# Patient Record
Sex: MALE | Race: BLACK OR AFRICAN AMERICAN | NOT HISPANIC OR LATINO | URBAN - METROPOLITAN AREA
[De-identification: names, ages, dates, MRNs, and addresses within clinical notes are randomized per-mention and may not be internally consistent; named-entity substitution may affect disease eponyms.]

---

## 2017-08-29 ENCOUNTER — EMERGENCY (EMERGENCY)
Facility: HOSPITAL | Age: 62
LOS: 0 days | Discharge: HOME | End: 2017-08-29
Attending: EMERGENCY MEDICINE
Payer: COMMERCIAL

## 2017-08-29 VITALS
WEIGHT: 244.93 LBS | RESPIRATION RATE: 18 BRPM | OXYGEN SATURATION: 100 % | TEMPERATURE: 98 F | HEART RATE: 95 BPM | SYSTOLIC BLOOD PRESSURE: 210 MMHG | DIASTOLIC BLOOD PRESSURE: 121 MMHG

## 2017-08-29 VITALS
DIASTOLIC BLOOD PRESSURE: 98 MMHG | SYSTOLIC BLOOD PRESSURE: 142 MMHG | TEMPERATURE: 98 F | RESPIRATION RATE: 18 BRPM | OXYGEN SATURATION: 100 % | HEART RATE: 76 BPM

## 2017-08-29 LAB
ALBUMIN SERPL ELPH-MCNC: 3.8 G/DL — SIGNIFICANT CHANGE UP (ref 3.3–5)
ALP SERPL-CCNC: 89 U/L — SIGNIFICANT CHANGE UP (ref 40–120)
ALT FLD-CCNC: 33 U/L — SIGNIFICANT CHANGE UP (ref 12–78)
ANION GAP SERPL CALC-SCNC: 7 MMOL/L — SIGNIFICANT CHANGE UP (ref 5–17)
APPEARANCE UR: ABNORMAL
APTT BLD: 31.2 SEC — SIGNIFICANT CHANGE UP (ref 27.5–37.4)
AST SERPL-CCNC: 17 U/L — SIGNIFICANT CHANGE UP (ref 15–37)
BACTERIA # UR AUTO: ABNORMAL
BASOPHILS # BLD AUTO: 0.1 K/UL — SIGNIFICANT CHANGE UP (ref 0–0.2)
BASOPHILS NFR BLD AUTO: 1.8 % — SIGNIFICANT CHANGE UP (ref 0–2)
BILIRUB SERPL-MCNC: 0.3 MG/DL — SIGNIFICANT CHANGE UP (ref 0.2–1.2)
BILIRUB UR-MCNC: NEGATIVE — SIGNIFICANT CHANGE UP
BUN SERPL-MCNC: 17 MG/DL — SIGNIFICANT CHANGE UP (ref 7–23)
CALCIUM SERPL-MCNC: 9 MG/DL — SIGNIFICANT CHANGE UP (ref 8.5–10.1)
CHLORIDE SERPL-SCNC: 110 MMOL/L — HIGH (ref 96–108)
CK SERPL-CCNC: 242 U/L — SIGNIFICANT CHANGE UP (ref 26–308)
CO2 SERPL-SCNC: 25 MMOL/L — SIGNIFICANT CHANGE UP (ref 22–31)
COLOR SPEC: ABNORMAL
CREAT SERPL-MCNC: 1.46 MG/DL — HIGH (ref 0.5–1.3)
DIFF PNL FLD: ABNORMAL
EOSINOPHIL # BLD AUTO: 0.1 K/UL — SIGNIFICANT CHANGE UP (ref 0–0.5)
EOSINOPHIL NFR BLD AUTO: 2.4 % — SIGNIFICANT CHANGE UP (ref 0–6)
GLUCOSE SERPL-MCNC: 83 MG/DL — SIGNIFICANT CHANGE UP (ref 70–99)
GLUCOSE UR QL: NEGATIVE MG/DL — SIGNIFICANT CHANGE UP
HCT VFR BLD CALC: 45.3 % — SIGNIFICANT CHANGE UP (ref 39–50)
HGB BLD-MCNC: 14.8 G/DL — SIGNIFICANT CHANGE UP (ref 13–17)
INR BLD: 1.15 RATIO — SIGNIFICANT CHANGE UP (ref 0.88–1.16)
KETONES UR-MCNC: NEGATIVE — SIGNIFICANT CHANGE UP
LEUKOCYTE ESTERASE UR-ACNC: ABNORMAL
LYMPHOCYTES # BLD AUTO: 2.5 K/UL — SIGNIFICANT CHANGE UP (ref 1–3.3)
LYMPHOCYTES # BLD AUTO: 40 % — SIGNIFICANT CHANGE UP (ref 13–44)
MCHC RBC-ENTMCNC: 28.7 PG — SIGNIFICANT CHANGE UP (ref 27–34)
MCHC RBC-ENTMCNC: 32.6 GM/DL — SIGNIFICANT CHANGE UP (ref 32–36)
MCV RBC AUTO: 88.1 FL — SIGNIFICANT CHANGE UP (ref 80–100)
MONOCYTES # BLD AUTO: 0.6 K/UL — SIGNIFICANT CHANGE UP (ref 0–0.9)
MONOCYTES NFR BLD AUTO: 10 % — SIGNIFICANT CHANGE UP (ref 2–14)
NEUTROPHILS # BLD AUTO: 2.9 K/UL — SIGNIFICANT CHANGE UP (ref 1.8–7.4)
NEUTROPHILS NFR BLD AUTO: 45.8 % — SIGNIFICANT CHANGE UP (ref 43–77)
NITRITE UR-MCNC: POSITIVE
PH UR: 5 — SIGNIFICANT CHANGE UP (ref 5–8)
PLATELET # BLD AUTO: 284 K/UL — SIGNIFICANT CHANGE UP (ref 150–400)
POTASSIUM SERPL-MCNC: 3.8 MMOL/L — SIGNIFICANT CHANGE UP (ref 3.5–5.3)
POTASSIUM SERPL-SCNC: 3.8 MMOL/L — SIGNIFICANT CHANGE UP (ref 3.5–5.3)
PROT SERPL-MCNC: 8.1 GM/DL — SIGNIFICANT CHANGE UP (ref 6–8.3)
PROT UR-MCNC: 100 MG/DL
PROTHROM AB SERPL-ACNC: 12.6 SEC — SIGNIFICANT CHANGE UP (ref 9.8–12.7)
RBC # BLD: 5.14 M/UL — SIGNIFICANT CHANGE UP (ref 4.2–5.8)
RBC # FLD: 12.9 % — SIGNIFICANT CHANGE UP (ref 11–15)
RBC CASTS # UR COMP ASSIST: SIGNIFICANT CHANGE UP /HPF (ref 0–4)
SODIUM SERPL-SCNC: 142 MMOL/L — SIGNIFICANT CHANGE UP (ref 135–145)
SP GR SPEC: 1.01 — SIGNIFICANT CHANGE UP (ref 1.01–1.02)
UROBILINOGEN FLD QL: NEGATIVE MG/DL — SIGNIFICANT CHANGE UP
WBC # BLD: 6.2 K/UL — SIGNIFICANT CHANGE UP (ref 3.8–10.5)
WBC # FLD AUTO: 6.2 K/UL — SIGNIFICANT CHANGE UP (ref 3.8–10.5)
WBC UR QL: ABNORMAL

## 2017-08-29 PROCEDURE — 70450 CT HEAD/BRAIN W/O DYE: CPT | Mod: 26

## 2017-08-29 PROCEDURE — 74176 CT ABD & PELVIS W/O CONTRAST: CPT | Mod: 26

## 2017-08-29 PROCEDURE — 99285 EMERGENCY DEPT VISIT HI MDM: CPT

## 2017-08-29 RX ORDER — AMLODIPINE BESYLATE 2.5 MG/1
1 TABLET ORAL
Qty: 30 | Refills: 0
Start: 2017-08-29 | End: 2017-09-28

## 2017-08-29 RX ORDER — AMLODIPINE BESYLATE 2.5 MG/1
5 TABLET ORAL ONCE
Qty: 0 | Refills: 0 | Status: COMPLETED | OUTPATIENT
Start: 2017-08-29 | End: 2017-08-29

## 2017-08-29 RX ORDER — NITROFURANTOIN MACROCRYSTAL 50 MG
1 CAPSULE ORAL
Qty: 20 | Refills: 0
Start: 2017-08-29 | End: 2017-09-08

## 2017-08-29 RX ORDER — NITROFURANTOIN MACROCRYSTAL 50 MG
100 CAPSULE ORAL ONCE
Qty: 0 | Refills: 0 | Status: COMPLETED | OUTPATIENT
Start: 2017-08-29 | End: 2017-08-29

## 2017-08-29 RX ADMIN — Medication 100 MILLIGRAM(S): at 16:09

## 2017-08-29 RX ADMIN — AMLODIPINE BESYLATE 5 MILLIGRAM(S): 2.5 TABLET ORAL at 16:09

## 2017-08-29 RX ADMIN — Medication 0.2 MILLIGRAM(S): at 13:43

## 2017-08-29 NOTE — ED PROVIDER NOTE - OBJECTIVE STATEMENT
62 years old male walked in with his daughter c/o bloody urine for couple days. Pt denies recent trauma, headache, dizziness, blurred visions, light sensitivities, focal/distal weakness or numbness, cough, sob, chest pain, nausea, vomiting, fever, chills, abd pain, dysuria or irregular bowel movements. Pt sts he has a hx of hypertension but not taking his medication for one year and does not remember the name of medication.

## 2017-08-29 NOTE — ED ADULT NURSE NOTE - OBJECTIVE STATEMENT
received pt sitting on stretcher c/o  urinary symptom since sunday denies any pain on urination , dark color urine denies any urinary frequnecy

## 2017-08-29 NOTE — ED PROVIDER NOTE - CONSTITUTIONAL, MLM
normal... Well appearing, well nourished, awake, alert, oriented to person, place, time/situation and in no apparent distress. Speaking in clear full sentences no nasal flaring no shoulders retractions smiling appears very comfortable siting up in the stretcher in a bright light room

## 2017-08-29 NOTE — ED ADULT NURSE NOTE - CHIEF COMPLAINT QUOTE
pt presents to the Ed with c/o HTN and hematuria send by [facility], denies HA

## 2017-08-29 NOTE — ED PROVIDER NOTE - PROGRESS NOTE DETAILS
Pt is alert and oriented x 3 smiling denies headache, dizziness, blurred visions, light sensitivities, focal/distal weakness or numbness, cough, sob, chest pain, nausea, vomiting, fever, chills, abd pain. Pt and his son are given and explained all test reports and advised to follow up with her doctor as soon as possible for further blood pressure management.

## 2017-08-30 DIAGNOSIS — I10 ESSENTIAL (PRIMARY) HYPERTENSION: ICD-10-CM

## 2017-08-30 DIAGNOSIS — N39.0 URINARY TRACT INFECTION, SITE NOT SPECIFIED: ICD-10-CM

## 2017-08-30 DIAGNOSIS — R51 HEADACHE: ICD-10-CM

## 2017-08-30 DIAGNOSIS — R42 DIZZINESS AND GIDDINESS: ICD-10-CM

## 2017-08-30 DIAGNOSIS — R31.9 HEMATURIA, UNSPECIFIED: ICD-10-CM

## 2017-08-30 LAB
CULTURE RESULTS: SIGNIFICANT CHANGE UP
SPECIMEN SOURCE: SIGNIFICANT CHANGE UP

## 2017-08-30 PROCEDURE — 93010 ELECTROCARDIOGRAM REPORT: CPT

## 2020-02-05 ENCOUNTER — INPATIENT (INPATIENT)
Facility: HOSPITAL | Age: 65
LOS: 6 days | Discharge: HOME | End: 2020-02-12
Attending: STUDENT IN AN ORGANIZED HEALTH CARE EDUCATION/TRAINING PROGRAM | Admitting: STUDENT IN AN ORGANIZED HEALTH CARE EDUCATION/TRAINING PROGRAM
Payer: COMMERCIAL

## 2020-02-05 VITALS
TEMPERATURE: 98 F | SYSTOLIC BLOOD PRESSURE: 186 MMHG | DIASTOLIC BLOOD PRESSURE: 107 MMHG | OXYGEN SATURATION: 97 % | WEIGHT: 229.94 LBS | HEIGHT: 71 IN | HEART RATE: 92 BPM | RESPIRATION RATE: 18 BRPM

## 2020-02-05 DIAGNOSIS — R79.89 OTHER SPECIFIED ABNORMAL FINDINGS OF BLOOD CHEMISTRY: ICD-10-CM

## 2020-02-05 DIAGNOSIS — I63.9 CEREBRAL INFARCTION, UNSPECIFIED: ICD-10-CM

## 2020-02-05 DIAGNOSIS — Z29.9 ENCOUNTER FOR PROPHYLACTIC MEASURES, UNSPECIFIED: ICD-10-CM

## 2020-02-05 DIAGNOSIS — I10 ESSENTIAL (PRIMARY) HYPERTENSION: ICD-10-CM

## 2020-02-05 PROBLEM — M10.9 GOUT, UNSPECIFIED: Chronic | Status: ACTIVE | Noted: 2017-08-29

## 2020-02-05 LAB
ALBUMIN SERPL ELPH-MCNC: 3.2 G/DL — LOW (ref 3.3–5)
ALP SERPL-CCNC: 102 U/L — SIGNIFICANT CHANGE UP (ref 40–120)
ALT FLD-CCNC: 42 U/L — SIGNIFICANT CHANGE UP (ref 12–78)
ANION GAP SERPL CALC-SCNC: 7 MMOL/L — SIGNIFICANT CHANGE UP (ref 5–17)
APTT BLD: 29.2 SEC — SIGNIFICANT CHANGE UP (ref 27.5–36.3)
AST SERPL-CCNC: 22 U/L — SIGNIFICANT CHANGE UP (ref 15–37)
BASOPHILS # BLD AUTO: 0.05 K/UL — SIGNIFICANT CHANGE UP (ref 0–0.2)
BASOPHILS NFR BLD AUTO: 0.6 % — SIGNIFICANT CHANGE UP (ref 0–2)
BILIRUB SERPL-MCNC: 0.3 MG/DL — SIGNIFICANT CHANGE UP (ref 0.2–1.2)
BUN SERPL-MCNC: 15 MG/DL — SIGNIFICANT CHANGE UP (ref 7–23)
CALCIUM SERPL-MCNC: 8.9 MG/DL — SIGNIFICANT CHANGE UP (ref 8.5–10.1)
CHLORIDE SERPL-SCNC: 109 MMOL/L — HIGH (ref 96–108)
CO2 SERPL-SCNC: 26 MMOL/L — SIGNIFICANT CHANGE UP (ref 22–31)
CREAT SERPL-MCNC: 1.55 MG/DL — HIGH (ref 0.5–1.3)
EOSINOPHIL # BLD AUTO: 0.09 K/UL — SIGNIFICANT CHANGE UP (ref 0–0.5)
EOSINOPHIL NFR BLD AUTO: 1.1 % — SIGNIFICANT CHANGE UP (ref 0–6)
GLUCOSE SERPL-MCNC: 139 MG/DL — HIGH (ref 70–99)
HCT VFR BLD CALC: 39.8 % — SIGNIFICANT CHANGE UP (ref 39–50)
HGB BLD-MCNC: 13.3 G/DL — SIGNIFICANT CHANGE UP (ref 13–17)
IMM GRANULOCYTES NFR BLD AUTO: 0.1 % — SIGNIFICANT CHANGE UP (ref 0–1.5)
INR BLD: 1.17 RATIO — HIGH (ref 0.88–1.16)
LYMPHOCYTES # BLD AUTO: 2.53 K/UL — SIGNIFICANT CHANGE UP (ref 1–3.3)
LYMPHOCYTES # BLD AUTO: 31.5 % — SIGNIFICANT CHANGE UP (ref 13–44)
MCHC RBC-ENTMCNC: 27.8 PG — SIGNIFICANT CHANGE UP (ref 27–34)
MCHC RBC-ENTMCNC: 33.4 GM/DL — SIGNIFICANT CHANGE UP (ref 32–36)
MCV RBC AUTO: 83.3 FL — SIGNIFICANT CHANGE UP (ref 80–100)
MONOCYTES # BLD AUTO: 0.82 K/UL — SIGNIFICANT CHANGE UP (ref 0–0.9)
MONOCYTES NFR BLD AUTO: 10.2 % — SIGNIFICANT CHANGE UP (ref 2–14)
NEUTROPHILS # BLD AUTO: 4.53 K/UL — SIGNIFICANT CHANGE UP (ref 1.8–7.4)
NEUTROPHILS NFR BLD AUTO: 56.5 % — SIGNIFICANT CHANGE UP (ref 43–77)
NRBC # BLD: 0 /100 WBCS — SIGNIFICANT CHANGE UP (ref 0–0)
PLATELET # BLD AUTO: 316 K/UL — SIGNIFICANT CHANGE UP (ref 150–400)
POTASSIUM SERPL-MCNC: 4.4 MMOL/L — SIGNIFICANT CHANGE UP (ref 3.5–5.3)
POTASSIUM SERPL-SCNC: 4.4 MMOL/L — SIGNIFICANT CHANGE UP (ref 3.5–5.3)
PROT SERPL-MCNC: 7.4 GM/DL — SIGNIFICANT CHANGE UP (ref 6–8.3)
PROTHROM AB SERPL-ACNC: 13.2 SEC — HIGH (ref 10–12.9)
RBC # BLD: 4.78 M/UL — SIGNIFICANT CHANGE UP (ref 4.2–5.8)
RBC # FLD: 14.4 % — SIGNIFICANT CHANGE UP (ref 10.3–14.5)
SODIUM SERPL-SCNC: 142 MMOL/L — SIGNIFICANT CHANGE UP (ref 135–145)
TROPONIN I SERPL-MCNC: 0.15 NG/ML — HIGH (ref 0.01–0.04)
WBC # BLD: 8.03 K/UL — SIGNIFICANT CHANGE UP (ref 3.8–10.5)
WBC # FLD AUTO: 8.03 K/UL — SIGNIFICANT CHANGE UP (ref 3.8–10.5)

## 2020-02-05 PROCEDURE — 99285 EMERGENCY DEPT VISIT HI MDM: CPT

## 2020-02-05 PROCEDURE — 70450 CT HEAD/BRAIN W/O DYE: CPT | Mod: 26

## 2020-02-05 PROCEDURE — 71045 X-RAY EXAM CHEST 1 VIEW: CPT | Mod: 26

## 2020-02-05 PROCEDURE — 93010 ELECTROCARDIOGRAM REPORT: CPT

## 2020-02-05 RX ORDER — ASPIRIN/CALCIUM CARB/MAGNESIUM 324 MG
300 TABLET ORAL DAILY
Refills: 0 | Status: DISCONTINUED | OUTPATIENT
Start: 2020-02-05 | End: 2020-02-05

## 2020-02-05 RX ORDER — ASPIRIN/CALCIUM CARB/MAGNESIUM 324 MG
81 TABLET ORAL DAILY
Refills: 0 | Status: DISCONTINUED | OUTPATIENT
Start: 2020-02-05 | End: 2020-02-12

## 2020-02-05 RX ORDER — AMLODIPINE BESYLATE 2.5 MG/1
10 TABLET ORAL DAILY
Refills: 0 | Status: DISCONTINUED | OUTPATIENT
Start: 2020-02-05 | End: 2020-02-12

## 2020-02-05 RX ORDER — METOPROLOL TARTRATE 50 MG
5 TABLET ORAL ONCE
Refills: 0 | Status: COMPLETED | OUTPATIENT
Start: 2020-02-05 | End: 2020-02-05

## 2020-02-05 RX ADMIN — Medication 5 MILLIGRAM(S): at 17:00

## 2020-02-05 NOTE — ED ADULT NURSE NOTE - MUSCULOSKELETAL WDL
Patient made aware that script has been sent to pharmacy. Patient verbalized understanding and declined further questions at this time.     Full range of motion of upper and lower extremities, no joint tenderness/swelling.

## 2020-02-05 NOTE — ED PROVIDER NOTE - EYES, MLM
left eye with diminished vision, able to see shadows but unable to see fingers in any of the quadrants. R eye vision intact, pupils responsive to light bilaterally

## 2020-02-05 NOTE — CONSULT NOTE ADULT - ASSESSMENT
Subjective Complaints:      Consult requested by ER doctor:                  Attending:     History of Present Illness:  Chief Complaint/Reason for Admission:  History of Present Illness:  HPI:  65 YO M with a sig PMHx of HTN presents to ED with loss of vision in left eye x 2 months. Patient states he went to see and optometrist today, and was sent to ED because his blood pressure was too high. Patient denies any other complaints. Denies dizziness, loss of consciousness, weakness, abnormal gait, chest pain, shortness of breath or palpitations. (05 Feb 2020 19:44)        PAST MEDICAL & SURGICAL HISTORY:  Gout  Hypertension  No significant past surgical history  64yMale    MEDICATIONS  (STANDING):  amLODIPine   Tablet 10 milliGRAM(s) Oral daily  aspirin enteric coated 81 milliGRAM(s) Oral daily    MEDICATIONS  (PRN):      Allergies    No Known Allergies    Intolerances      FAMILY HISTORY:      REVIEW OF SYSTEMS:  General:  No wt loss, fevers, chills, night sweats  Eyes:  Good vision, no reported pain  ENT:  No sore throat, pain, runny nose, dysphagia  CV:  No pain, palpitatioins, hypo/hypertension  Resp:  No dyspnea, cough, tachypnea, wheezing  GI:  No pain, nausea, vomiting, diarrhea, constipatiion  :  No pain, bleeding, incontinence, nocturia  Muscle:  No pain, weakness  Breast:  No pain, abscess, mass, discharge  Neuro:  No weakness, tingling, memory problems  Psych:  No fatigue, insomnia, mood problems, depression  Endocrine:  No polyuria, polydypsia, cold/heat intolerance  Heme:  No petechiae, ecchymosis, easy bruisability  Skin:  No rash, tattoos, scars, edema      Vital Signs Last 24 Hrs  T(C): 37.3 (05 Feb 2020 18:29), Max: 37.3 (05 Feb 2020 18:29)  T(F): 99.2 (05 Feb 2020 18:29), Max: 99.2 (05 Feb 2020 18:29)  HR: 76 (05 Feb 2020 18:29) (76 - 92)  BP: 146/97 (05 Feb 2020 18:29) (146/97 - 186/107)  BP(mean): --  RR: 18 (05 Feb 2020 18:29) (18 - 18)  SpO2: 99% (05 Feb 2020 18:29) (97% - 99%)    GENERAL PHYSICAL EXAM:  General:  Appears stated age, well-groomed, well-nourished, no distress  HEENT:  NC/AT, patent nares w/ pink mucosa, OP clear w/o lesions, PERRL, EOMI, conjunctivae clear, no thyromegaly, nodules, adenopathy, no JVD  Chest:  Full & symmetric excursion, no increased effort, breath sounds clear  Cardiovascular:  Regular rhythm, S1, S2, no murmur/rub/S3/S4, no carotid/femoral/abdominal bruit, radial/pedal pulses 2+, no edema  Abdomen:  Soft, non-tender, non-distended, normoactive bowel sounds, no HSM  Extremities:  Gait & station:   Digits:   Nails:   Joints, Bones, Muscles:   ROM:   Stability:  Skin:  No rash/erythema/ecchymoses/petechiae/wounds/abscess/warm/dry  Musculoskeletal:  Full ROM in all joints w/o swelling/tenderness/effusion    NEUROLOGICAL EXAM:  HENT:  Normocephalic head; atraumatic head.  Neck supple.  ENT: normal looking.  Mental State:    Alert.  Fully oriented to person, place and date.  Coherent.  Speech clear and intact.  Cooperative.  Responds appropriately.    Cranial Nerves:  II-XII:   Pupils round and reactive to light and accommodation.  Extraocular movements full. left eyes decreased vision for 2 ,months finger counting   Facial symmetry intact.  Tongue midline.  Motor Functions:  Moves all extremities.  No pronator drift of UE.  Claps hand well.  Hand  intact bilaterally.  Ambulatory.    Sensory Functions:   Intact to touch and pinprick to face and extremities.    Reflexes:  Deep tendon reflexes normoactive to biceps, knees and ankles.  Babinski absent (present).  Cerebellar Testing:    Finger to nose intact.  Nystagmus absent.  Neurovascular: Carotid auscultation full without bruits.      LABS:                        13.3   8.03  )-----------( 316      ( 05 Feb 2020 16:48 )             39.8     02-05    142  |  109<H>  |  15  ----------------------------<  139<H>  4.4   |  26  |  1.55<H>    Ca    8.9      05 Feb 2020 16:48    TPro  7.4  /  Alb  3.2<L>  /  TBili  0.3  /  DBili  x   /  AST  22  /  ALT  42  /  AlkPhos  102  02-05    PT/INR - ( 05 Feb 2020 16:48 )   PT: 13.2 sec;   INR: 1.17 ratio         PTT - ( 05 Feb 2020 16:48 )  PTT:29.2 sec        RADIOLOGY & ADDITIONAL STUDIES:      Assessment & Opinion:  events noted decreased  vision left eye for 2 months of  of =htn non compliance   poor memeory arm leg 4/5 semsory intact for work up  mri brain mra brain  asa 81 mg   ct head no acute path  r/o cva     Recommendations:  Brain MRI.  Carotid doppler.  Echocardiogram.  EEG.   DVT prophylaxis as ordered.  Medications:

## 2020-02-05 NOTE — ED PROVIDER NOTE - CARE PLAN
Principal Discharge DX:	Troponin level elevated  Secondary Diagnosis:	CVA (cerebral vascular accident)

## 2020-02-05 NOTE — ED ADULT NURSE NOTE - OBJECTIVE STATEMENT
received er bed 12 c/o elevated blood pressure states blurry vision x 1 1/2 weeks denies any headache or dizziness non compliant with rx bp meds x >2 months

## 2020-02-05 NOTE — H&P ADULT - PROBLEM SELECTOR PLAN 1
With left eye vision loss  Admit to telemetry to r/o arrythmia  Neurology consult appreciated  Pending MRA of brain and neck  Pending TTE  Start ASA 81mg Daily  May need to be seen by retinol specialist  Continue to monitor

## 2020-02-05 NOTE — ED PROVIDER NOTE - CLINICAL SUMMARY MEDICAL DECISION MAKING FREE TEXT BOX
pt with vision change on left eye x 2 months discussed issue with Dr. Vieyra, in combination of facial droop and slurred speech - pt likely had CVA 2 months ago, main issue today is trop elevation with HTN - will admit for further care with Dr. Flor, Dr. Vieyra consulted - will see pt.

## 2020-02-05 NOTE — H&P ADULT - NSHPPHYSICALEXAM_GEN_ALL_CORE
Vital Signs Last 24 Hrs  T(C): 37.3 (05 Feb 2020 18:29), Max: 37.3 (05 Feb 2020 18:29)  T(F): 99.2 (05 Feb 2020 18:29), Max: 99.2 (05 Feb 2020 18:29)  HR: 76 (05 Feb 2020 18:29) (76 - 92)  BP: 146/97 (05 Feb 2020 18:29) (146/97 - 186/107)  BP(mean): --  RR: 18 (05 Feb 2020 18:29) (18 - 18)  SpO2: 99% (05 Feb 2020 18:29) (97% - 99%)    PHYSICAL EXAM:  GENERAL: NAD, well-groomed, well-developed  HEAD:  Atraumatic, Normocephalic  EYES: Decreased vision in left eye, can not enumerate number of fingers in front of left eye, states he sees shaddows,  EOMI, PERRL, conjunctiva and sclera clear  ENMT: No tonsillar erythema, exudates, or enlargement; Moist mucous membranes  NECK: Supple, No JVD, Normal thyroid  NERVOUS SYSTEM:  Alert & Oriented X3, Good concentration; Motor Strength 5/5 B/L upper and lower extremities  CHEST/LUNG: Clear to auscultation bilaterally; No rales, rhonchi, wheezing, or rubs  HEART: Regular rate and rhythm; No murmurs appreciated  ABDOMEN: Soft, Nontender, Nondistended; Bowel sounds present  MSK: ROM intact in all extremities  EXTREMITIES:  2+ Peripheral Pulses, No clubbing, cyanosis, or edema  LYMPH: No lymphadenopathy noted  SKIN: No rashes or lesions

## 2020-02-05 NOTE — ED PROVIDER NOTE - OBJECTIVE STATEMENT
64 year old male with PMH of HTN noncompliant with meds presenting to ED due to HTN noted while pt was seen eye doctor for blurred vision. Pt states has had blurred vision with left side barely able to see for past 2 months, denies any eye pain.

## 2020-02-05 NOTE — H&P ADULT - HISTORY OF PRESENT ILLNESS
63 YO M with a sig PMHx of HTN presents to ED with loss of vision in left eye x 2 months. Patient states he went to see and optometrist today, and was sent to ED because his blood pressure was too high. Patient denies any other complaints. Denies dizziness, loss of consciousness, weakness, abnormal gait, chest pain, shortness of breath or palpitations.

## 2020-02-05 NOTE — H&P ADULT - NSHPREVIEWOFSYSTEMS_GEN_ALL_CORE
REVIEW OF SYSTEMS:  Constitutional: Denies fever, weight loss, fatigue  Eye: Admits to visual changes in left eye. Denies eye pain, discharge  ENT: Denies hearing changes, tinnitus, vertigo, sinus congestion, sore throat  Neck: Denies pain or stiffness  Respiratory: Denies cough, wheezing, chills, hemoptysis, shortness of breath, difficulty breathing  Cardiovascular: Denies chest pain, palpitations, dizziness, leg swelling  Gastrointestinal: Denies abdominal pain, nausea, vomiting, hematemesis, diarrhea, constipation, melena, hematochezia  Genitourinary: Denies dysuria, frequency, hematuria, retention, incontinence  Neurological: Denies headaches, memory loss, loss of strength, numbness, tremors  Skin: Denies itching, burning, rashes, lesions   Endocrine: Denies heat or cold intolerance, hair loss  Musculoskeletal: Denies joint pain or swelling, back, extremity pain  Psychiatric: Denies depression, anxiety, mood swings, difficulty sleeping, suicidal ideation  Hematology: Denies easy bruising, bleeding gums  Immunologic: Denies hives or eczema

## 2020-02-05 NOTE — ED ADULT TRIAGE NOTE - CHIEF COMPLAINT QUOTE
pt sent in by pcp to be evaluated for change in vision and hypertension. states he has not taken blood pressure medication in months. blood pressure 186/107 at triage.

## 2020-02-05 NOTE — PROVIDER CONTACT NOTE (CRITICAL VALUE NOTIFICATION) - NS PROVIDER READ BACK
risks/benefits/alternatives discussed with patient, daughters and sons prior to procedure - all questions answered./This was an emergent procedure.
yes

## 2020-02-05 NOTE — H&P ADULT - ASSESSMENT
63 YO M with a sig PMHx of HTN presents to ED with loss of vision in left eye x 2 months, requires admission to rule out stroke.    IMPROVE VTE Individual Risk Assessment    RISK                                                                Points    [  ] Previous VTE                                                  3    [  ] Thrombophilia                                               2    [  ] Lower limb paralysis                                      2        (unable to hold up >15 seconds)      [  ] Current Cancer                                              2         (within 6 months)    [  ] Immobilization > 24 hrs                                1    [  ] ICU/CCU stay > 24 hours                              1    [ 1 ] Age > 60                                                      1    IMPROVE VTE Score _____1____

## 2020-02-05 NOTE — ED ADULT NURSE NOTE - ED STAT RN HANDOFF DETAILS
admitted telemetry on monitor bp improved states vision less blurry endorsed to er hold chiki sheets awaiting bed availability

## 2020-02-06 LAB
ALBUMIN SERPL ELPH-MCNC: 3.1 G/DL — LOW (ref 3.3–5)
ALP SERPL-CCNC: 100 U/L — SIGNIFICANT CHANGE UP (ref 40–120)
ALT FLD-CCNC: 38 U/L — SIGNIFICANT CHANGE UP (ref 12–78)
ANION GAP SERPL CALC-SCNC: 7 MMOL/L — SIGNIFICANT CHANGE UP (ref 5–17)
AST SERPL-CCNC: 18 U/L — SIGNIFICANT CHANGE UP (ref 15–37)
BASOPHILS # BLD AUTO: 0.05 K/UL — SIGNIFICANT CHANGE UP (ref 0–0.2)
BASOPHILS NFR BLD AUTO: 0.7 % — SIGNIFICANT CHANGE UP (ref 0–2)
BILIRUB SERPL-MCNC: 0.6 MG/DL — SIGNIFICANT CHANGE UP (ref 0.2–1.2)
BUN SERPL-MCNC: 17 MG/DL — SIGNIFICANT CHANGE UP (ref 7–23)
CALCIUM SERPL-MCNC: 9.1 MG/DL — SIGNIFICANT CHANGE UP (ref 8.5–10.1)
CHLORIDE SERPL-SCNC: 110 MMOL/L — HIGH (ref 96–108)
CHOLEST SERPL-MCNC: 178 MG/DL — SIGNIFICANT CHANGE UP (ref 10–199)
CO2 SERPL-SCNC: 28 MMOL/L — SIGNIFICANT CHANGE UP (ref 22–31)
CREAT SERPL-MCNC: 1.51 MG/DL — HIGH (ref 0.5–1.3)
EOSINOPHIL # BLD AUTO: 0.12 K/UL — SIGNIFICANT CHANGE UP (ref 0–0.5)
EOSINOPHIL NFR BLD AUTO: 1.6 % — SIGNIFICANT CHANGE UP (ref 0–6)
ESTIMATED AVERAGE GLUCOSE: 137 MG/DL — HIGH (ref 68–114)
GLUCOSE SERPL-MCNC: 137 MG/DL — HIGH (ref 70–99)
HBA1C BLD-MCNC: 6.4 % — HIGH (ref 4–5.6)
HBA1C BLD-MCNC: 6.4 % — HIGH (ref 4–5.6)
HCT VFR BLD CALC: 40.3 % — SIGNIFICANT CHANGE UP (ref 39–50)
HCV AB S/CO SERPL IA: 0.1 S/CO — SIGNIFICANT CHANGE UP (ref 0–0.99)
HCV AB SERPL-IMP: SIGNIFICANT CHANGE UP
HDLC SERPL-MCNC: 45 MG/DL — SIGNIFICANT CHANGE UP
HGB BLD-MCNC: 13.4 G/DL — SIGNIFICANT CHANGE UP (ref 13–17)
IMM GRANULOCYTES NFR BLD AUTO: 0.3 % — SIGNIFICANT CHANGE UP (ref 0–1.5)
LIPID PNL WITH DIRECT LDL SERPL: 113 MG/DL — HIGH
LYMPHOCYTES # BLD AUTO: 2.68 K/UL — SIGNIFICANT CHANGE UP (ref 1–3.3)
LYMPHOCYTES # BLD AUTO: 36.2 % — SIGNIFICANT CHANGE UP (ref 13–44)
MAGNESIUM SERPL-MCNC: 2.1 MG/DL — SIGNIFICANT CHANGE UP (ref 1.6–2.6)
MCHC RBC-ENTMCNC: 28.1 PG — SIGNIFICANT CHANGE UP (ref 27–34)
MCHC RBC-ENTMCNC: 33.3 GM/DL — SIGNIFICANT CHANGE UP (ref 32–36)
MCV RBC AUTO: 84.5 FL — SIGNIFICANT CHANGE UP (ref 80–100)
MONOCYTES # BLD AUTO: 0.86 K/UL — SIGNIFICANT CHANGE UP (ref 0–0.9)
MONOCYTES NFR BLD AUTO: 11.6 % — SIGNIFICANT CHANGE UP (ref 2–14)
NEUTROPHILS # BLD AUTO: 3.68 K/UL — SIGNIFICANT CHANGE UP (ref 1.8–7.4)
NEUTROPHILS NFR BLD AUTO: 49.6 % — SIGNIFICANT CHANGE UP (ref 43–77)
NRBC # BLD: 0 /100 WBCS — SIGNIFICANT CHANGE UP (ref 0–0)
PHOSPHATE SERPL-MCNC: 3.4 MG/DL — SIGNIFICANT CHANGE UP (ref 2.5–4.5)
PLATELET # BLD AUTO: 291 K/UL — SIGNIFICANT CHANGE UP (ref 150–400)
POTASSIUM SERPL-MCNC: 4 MMOL/L — SIGNIFICANT CHANGE UP (ref 3.5–5.3)
POTASSIUM SERPL-SCNC: 4 MMOL/L — SIGNIFICANT CHANGE UP (ref 3.5–5.3)
PROT SERPL-MCNC: 7.5 GM/DL — SIGNIFICANT CHANGE UP (ref 6–8.3)
RBC # BLD: 4.77 M/UL — SIGNIFICANT CHANGE UP (ref 4.2–5.8)
RBC # FLD: 14.4 % — SIGNIFICANT CHANGE UP (ref 10.3–14.5)
SODIUM SERPL-SCNC: 145 MMOL/L — SIGNIFICANT CHANGE UP (ref 135–145)
TOTAL CHOLESTEROL/HDL RATIO MEASUREMENT: 4 RATIO — SIGNIFICANT CHANGE UP (ref 3.4–9.6)
TRIGL SERPL-MCNC: 104 MG/DL — SIGNIFICANT CHANGE UP (ref 10–149)
TROPONIN I SERPL-MCNC: 0.13 NG/ML — HIGH (ref 0.01–0.04)
WBC # BLD: 7.41 K/UL — SIGNIFICANT CHANGE UP (ref 3.8–10.5)
WBC # FLD AUTO: 7.41 K/UL — SIGNIFICANT CHANGE UP (ref 3.8–10.5)

## 2020-02-06 PROCEDURE — 70551 MRI BRAIN STEM W/O DYE: CPT | Mod: 26

## 2020-02-06 PROCEDURE — 99232 SBSQ HOSP IP/OBS MODERATE 35: CPT

## 2020-02-06 PROCEDURE — 70544 MR ANGIOGRAPHY HEAD W/O DYE: CPT | Mod: 26,59

## 2020-02-06 PROCEDURE — 70547 MR ANGIOGRAPHY NECK W/O DYE: CPT | Mod: 26

## 2020-02-06 RX ORDER — HYDRALAZINE HCL 50 MG
10 TABLET ORAL
Refills: 0 | Status: DISCONTINUED | OUTPATIENT
Start: 2020-02-06 | End: 2020-02-07

## 2020-02-06 RX ORDER — INFLUENZA VIRUS VACCINE 15; 15; 15; 15 UG/.5ML; UG/.5ML; UG/.5ML; UG/.5ML
0.5 SUSPENSION INTRAMUSCULAR ONCE
Refills: 0 | Status: DISCONTINUED | OUTPATIENT
Start: 2020-02-06 | End: 2020-02-12

## 2020-02-06 RX ADMIN — Medication 81 MILLIGRAM(S): at 11:38

## 2020-02-06 RX ADMIN — AMLODIPINE BESYLATE 10 MILLIGRAM(S): 2.5 TABLET ORAL at 06:15

## 2020-02-06 RX ADMIN — Medication 10 MILLIGRAM(S): at 19:06

## 2020-02-06 NOTE — PROGRESS NOTE ADULT - ASSESSMENT
Subjective Complaints:  Historian:             Vital Signs Last 24 Hrs  T(C): 36.8 (06 Feb 2020 16:29), Max: 36.8 (06 Feb 2020 16:29)  T(F): 98.3 (06 Feb 2020 16:29), Max: 98.3 (06 Feb 2020 16:29)  HR: 86 (06 Feb 2020 22:03) (81 - 99)  BP: 163/87 (06 Feb 2020 22:03) (157/98 - 168/104)  BP(mean): --  RR: 17 (06 Feb 2020 16:29) (15 - 17)  SpO2: 98% (06 Feb 2020 16:29) (96% - 99%)    GENERAL PHYSICAL EXAM:  General:  Appears stated age, well-groomed, well-nourished, no distress  HEENT:  NC/AT, patent nares w/ pink mucosa, OP clear w/o lesions, PERRL, EOMI, conjunctivae clear, no thyromegaly, nodules, adenopathy, no JVD  Chest:  Full & symmetric excursion, no increased effort, breath sounds clear  Cardiovascular:  Regular rhythm, S1, S2, no murmur/rub/S3/S4, no carotid/femoral/abdominal bruit, radial/pedal pulses 2+, no edema  Abdomen:  Soft, non-tender, non-distended, normoactive bowel sounds, no HSM  Extremities:  Gait & station:   Digits:   Nails:   Joints, Bones, Muscles:   ROM:   Stability:  Skin:  No rash/erythema/ecchymoses/petechiae/wounds/abscess/warm/dry  Musculoskeletal:  Full ROM in all joints w/o swelling/tenderness/effusion        LABS:                        13.4   7.41  )-----------( 291      ( 06 Feb 2020 01:36 )             40.3     02-06    145  |  110<H>  |  17  ----------------------------<  137<H>  4.0   |  28  |  1.51<H>    Ca    9.1      06 Feb 2020 01:36  Phos  3.4     02-06  Mg     2.1     02-06    TPro  7.5  /  Alb  3.1<L>  /  TBili  0.6  /  DBili  x   /  AST  18  /  ALT  38  /  AlkPhos  100  02-06    PT/INR - ( 05 Feb 2020 16:48 )   PT: 13.2 sec;   INR: 1.17 ratio         PTT - ( 05 Feb 2020 16:48 )  PTT:29.2 sec      RADIOLOGY & ADDITIONAL STUDIES:        Neurology Progress Note:      Mental Status:   awaek alert speech fluent        Cranial Nerves:  left eye vision impaired        Motor:    arm, leg 4/5         Sensory:intact      Cerebellar: defrd      Gait:defrd      Assesment/Plan:  left eys vision impaired   mri brain na mra bran  and neck noted no acute path  on asa arm, leg 4/5 will follow opthalomology follow up

## 2020-02-06 NOTE — PROGRESS NOTE ADULT - ASSESSMENT
65 YO M with a sig PMHx of HTN presents to ED with loss of vision in left eye x 2 months, requires admission to rule out stroke.    IMPROVE VTE Individual Risk Assessment    RISK                                                                Points    [  ] Previous VTE                                                  3    [  ] Thrombophilia                                               2    [  ] Lower limb paralysis                                      2        (unable to hold up >15 seconds)      [  ] Current Cancer                                              2         (within 6 months)    [  ] Immobilization > 24 hrs                                1    [  ] ICU/CCU stay > 24 hours                              1    [ 1 ] Age > 60                                                      1    IMPROVE VTE Score _____1____

## 2020-02-06 NOTE — PROGRESS NOTE ADULT - PROBLEM SELECTOR PLAN 1
With left eye vision loss  Admit to telemetry to r/o arrythmia  Neurology consult appreciated, reviewed MRA brain shows no acute findings.   Follow up TTE   Start ASA 81mg Daily  Continue to monitor With left eye vision loss, admit to telemetry to r/o arrythmia  Neurology consult appreciated, reviewed MRA brain shows no acute findings.   Follow up TTE   Start ASA 81mg Daily  Continue to monitor

## 2020-02-06 NOTE — PROGRESS NOTE ADULT - SUBJECTIVE AND OBJECTIVE BOX
Patient is a 64y old  Male who presents with a chief complaint of Loss of vision in left eye (05 Feb 2020 21:22)      SUBJECTIVE / OVERNIGHT EVENTS: No events over night.     T(C): 36.8 (02-06-20 @ 16:29), Max: 36.8 (02-06-20 @ 16:29)  HR: 92 (02-06-20 @ 16:29) (81 - 99)  BP: 168/104 (02-06-20 @ 16:29) (157/98 - 168/104)  RR: 17 (02-06-20 @ 16:29) (15 - 17)  SpO2: 98% (02-06-20 @ 16:29) (96% - 99%)      MEDICATIONS  (STANDING):  amLODIPine   Tablet 10 milliGRAM(s) Oral daily  aspirin enteric coated 81 milliGRAM(s) Oral daily  hydrALAZINE 10 milliGRAM(s) Oral two times a day  influenza   Vaccine 0.5 milliLiter(s) IntraMuscular once    MEDICATIONS  (PRN):      MEDICATIONS  (STANDING):  amLODIPine   Tablet 10 milliGRAM(s) Oral daily  aspirin enteric coated 81 milliGRAM(s) Oral daily  hydrALAZINE 10 milliGRAM(s) Oral two times a day  influenza   Vaccine 0.5 milliLiter(s) IntraMuscular once    MEDICATIONS  (PRN):      CAPILLARY BLOOD GLUCOSE        I&O's Summary    T(C): 36.8 (02-06-20 @ 16:29), Max: 36.8 (02-06-20 @ 16:29)  HR: 92 (02-06-20 @ 16:29) (81 - 99)  BP: 168/104 (02-06-20 @ 16:29) (157/98 - 168/104)  RR: 17 (02-06-20 @ 16:29) (15 - 17)  SpO2: 98% (02-06-20 @ 16:29) (96% - 99%)    PHYSICAL EXAM:  GENERAL: NAD, well-developed  HEAD:  Atraumatic, Normocephalic  EYES: EOMI, conjunctiva and sclera clear  NECK: Supple, No JVD  CHEST/LUNG: Clear to auscultation bilaterally; No wheeze  HEART: Regular rate and rhythm; No murmurs, rubs, or gallops  ABDOMEN: Soft, Nontender, Nondistended; Bowel sounds present  EXTREMITIES:  2+ Peripheral Pulses, No clubbing, cyanosis, or edema  PSYCH: AAOx3  NEUROLOGY: non-focal  SKIN: No rashes or lesions                            13.4   7.41  )-----------( 291      ( 06 Feb 2020 01:36 )             40.3       CARDIAC MARKERS ( 06 Feb 2020 01:36 )  .129 ng/mL / x     / x     / x     / x      CARDIAC MARKERS ( 05 Feb 2020 16:48 )  .146 ng/mL / x     / x     / x     / x          LIVER FUNCTIONS - ( 06 Feb 2020 01:36 )  Alb: 3.1 g/dL / Pro: 7.5 gm/dL / ALK PHOS: 100 U/L / ALT: 38 U/L / AST: 18 U/L / GGT: x           PT/INR - ( 05 Feb 2020 16:48 )   PT: 13.2 sec;   INR: 1.17 ratio         PTT - ( 05 Feb 2020 16:48 )  PTT:29.2 sec  145|110|17<137  4.0|28|1.51  9.1,2.1,3.4  02-06 @ 01:36      RADIOLOGY & ADDITIONAL TESTS:    Imaging Personally Reviewed:    Consultant(s) Notes Reviewed:      Care Discussed with Consultants/Other Providers: Patient is a 64y old  Male who presents with a chief complaint of Loss of vision in left eye (05 Feb 2020 21:22)      SUBJECTIVE / OVERNIGHT EVENTS: No events over night. No complaints.   Son Bed side.     T(C): 36.8 (02-06-20 @ 16:29), Max: 36.8 (02-06-20 @ 16:29)  HR: 92 (02-06-20 @ 16:29) (81 - 99)  BP: 168/104 (02-06-20 @ 16:29) (157/98 - 168/104)  RR: 17 (02-06-20 @ 16:29) (15 - 17)  SpO2: 98% (02-06-20 @ 16:29) (96% - 99%)      MEDICATIONS  (STANDING):  amLODIPine   Tablet 10 milliGRAM(s) Oral daily  aspirin enteric coated 81 milliGRAM(s) Oral daily  hydrALAZINE 10 milliGRAM(s) Oral two times a day  influenza   Vaccine 0.5 milliLiter(s) IntraMuscular once    MEDICATIONS  (PRN):      MEDICATIONS  (STANDING):  amLODIPine   Tablet 10 milliGRAM(s) Oral daily  aspirin enteric coated 81 milliGRAM(s) Oral daily  hydrALAZINE 10 milliGRAM(s) Oral two times a day  influenza   Vaccine 0.5 milliLiter(s) IntraMuscular once    MEDICATIONS  (PRN):      CAPILLARY BLOOD GLUCOSE        I&O's Summary    T(C): 36.8 (02-06-20 @ 16:29), Max: 36.8 (02-06-20 @ 16:29)  HR: 92 (02-06-20 @ 16:29) (81 - 99)  BP: 168/104 (02-06-20 @ 16:29) (157/98 - 168/104)  RR: 17 (02-06-20 @ 16:29) (15 - 17)  SpO2: 98% (02-06-20 @ 16:29) (96% - 99%)    PHYSICAL EXAM:  GENERAL: NAD, well-developed  HEAD:  Atraumatic, Normocephalic  EYES: EOMI, conjunctiva and sclera clear  NECK: Supple, No JVD  CHEST/LUNG: Clear to auscultation bilaterally; No wheeze  HEART: Regular rate and rhythm; No murmurs, rubs, or gallops  ABDOMEN: Soft, Nontender, Nondistended; Bowel sounds present  EXTREMITIES:  2+ Peripheral Pulses, No clubbing, cyanosis, or edema  PSYCH: AAOx3  NEUROLOGY: non-focal  SKIN: No rashes or lesions                            13.4   7.41  )-----------( 291      ( 06 Feb 2020 01:36 )             40.3       CARDIAC MARKERS ( 06 Feb 2020 01:36 )  .129 ng/mL / x     / x     / x     / x      CARDIAC MARKERS ( 05 Feb 2020 16:48 )  .146 ng/mL / x     / x     / x     / x          LIVER FUNCTIONS - ( 06 Feb 2020 01:36 )  Alb: 3.1 g/dL / Pro: 7.5 gm/dL / ALK PHOS: 100 U/L / ALT: 38 U/L / AST: 18 U/L / GGT: x           PT/INR - ( 05 Feb 2020 16:48 )   PT: 13.2 sec;   INR: 1.17 ratio         PTT - ( 05 Feb 2020 16:48 )  PTT:29.2 sec  145|110|17<137  4.0|28|1.51  9.1,2.1,3.4  02-06 @ 01:36      RADIOLOGY & ADDITIONAL TESTS:    Imaging Personally Reviewed:    Consultant(s) Notes Reviewed:      Care Discussed with Consultants/Other Providers:

## 2020-02-07 LAB — TROPONIN I SERPL-MCNC: 0.05 NG/ML — HIGH (ref 0.01–0.04)

## 2020-02-07 PROCEDURE — 99233 SBSQ HOSP IP/OBS HIGH 50: CPT

## 2020-02-07 PROCEDURE — 99232 SBSQ HOSP IP/OBS MODERATE 35: CPT

## 2020-02-07 RX ORDER — HYDRALAZINE HCL 50 MG
25 TABLET ORAL THREE TIMES A DAY
Refills: 0 | Status: DISCONTINUED | OUTPATIENT
Start: 2020-02-07 | End: 2020-02-12

## 2020-02-07 RX ORDER — HEPARIN SODIUM 5000 [USP'U]/ML
5000 INJECTION INTRAVENOUS; SUBCUTANEOUS THREE TIMES A DAY
Refills: 0 | Status: DISCONTINUED | OUTPATIENT
Start: 2020-02-07 | End: 2020-02-12

## 2020-02-07 RX ORDER — ATORVASTATIN CALCIUM 80 MG/1
40 TABLET, FILM COATED ORAL AT BEDTIME
Refills: 0 | Status: DISCONTINUED | OUTPATIENT
Start: 2020-02-07 | End: 2020-02-12

## 2020-02-07 RX ORDER — CARVEDILOL PHOSPHATE 80 MG/1
12.5 CAPSULE, EXTENDED RELEASE ORAL EVERY 12 HOURS
Refills: 0 | Status: DISCONTINUED | OUTPATIENT
Start: 2020-02-07 | End: 2020-02-08

## 2020-02-07 RX ORDER — HYDRALAZINE HCL 50 MG
5 TABLET ORAL ONCE
Refills: 0 | Status: COMPLETED | OUTPATIENT
Start: 2020-02-07 | End: 2020-02-07

## 2020-02-07 RX ORDER — REGADENOSON 0.08 MG/ML
0.4 INJECTION, SOLUTION INTRAVENOUS ONCE
Refills: 0 | Status: COMPLETED | OUTPATIENT
Start: 2020-02-07 | End: 2020-02-10

## 2020-02-07 RX ADMIN — CARVEDILOL PHOSPHATE 12.5 MILLIGRAM(S): 80 CAPSULE, EXTENDED RELEASE ORAL at 17:35

## 2020-02-07 RX ADMIN — HEPARIN SODIUM 5000 UNIT(S): 5000 INJECTION INTRAVENOUS; SUBCUTANEOUS at 22:02

## 2020-02-07 RX ADMIN — AMLODIPINE BESYLATE 10 MILLIGRAM(S): 2.5 TABLET ORAL at 05:50

## 2020-02-07 RX ADMIN — ATORVASTATIN CALCIUM 40 MILLIGRAM(S): 80 TABLET, FILM COATED ORAL at 22:02

## 2020-02-07 RX ADMIN — Medication 10 MILLIGRAM(S): at 05:50

## 2020-02-07 RX ADMIN — Medication 25 MILLIGRAM(S): at 22:02

## 2020-02-07 RX ADMIN — Medication 81 MILLIGRAM(S): at 11:55

## 2020-02-07 RX ADMIN — Medication 5 MILLIGRAM(S): at 11:55

## 2020-02-07 RX ADMIN — Medication 25 MILLIGRAM(S): at 13:03

## 2020-02-07 NOTE — PROGRESS NOTE ADULT - ASSESSMENT
65 YO M with a sig PMHx of HTN presents to ED with loss of vision in left eye x 2 months, Pt was seen by an ophthalmologist as outpt and was referred to a Retina specialist but he was sent to the ER from there for high BP.    #Left eye vision Impairment  c/w Aspirin  MRI showed chronic ischemic changes, No acute ischemia  f/u carotid doppler  Neurology on board.    Troponin elevated  0.146 -> 0.129  trend trop  Echo- EF 40- 45%, Mildly dec global L ventricular systolic function  Cardiology consult  c/w ASA for now.    Uncontrolled HTN  increased hydralazine to 25mg Q8H  c/w amlodipine    CANDICE  unknown baseline  cr improving  f/u bmp  f/u Renal US    Preventive measure  Heparin SQ 63 YO M with a sig PMHx of HTN presents to ED with loss of vision in left eye x 2 months, Pt was seen by an ophthalmologist as outpt and was referred to a Retina specialist but he was sent to the ER from there for high BP.    #Left eye vision Impairment  c/w Aspirin  MRI showed chronic ischemic changes, No acute ischemia  f/u carotid doppler  Neurology on board.    Troponin elevated  0.146 -> 0.129  could be sec to stress cardiomyopathy vs demand  trend trop  Echo- EF 40- 45%, Mildly dec global L ventricular systolic function  c/w ASA, coreg, lipitor  possible stress test on monday  cardiology on board.      Uncontrolled HTN  increased hydralazine to 25mg Q8H  c/w amlodipine    CANDICE  unknown baseline  cr improving  f/u bmp  f/u Renal US    Preventive measure  Heparin SQ

## 2020-02-07 NOTE — PROGRESS NOTE ADULT - SUBJECTIVE AND OBJECTIVE BOX
Patient is a 64y old  Male who presents with a chief complaint of Loss of vision in left eye      Subjective: Pt seen and examined. comfortably lying in bed.      REVIEW OF SYSTEMS:    CONSTITUTIONAL: No fever, weight loss, or fatigue  EYES: No eye pain, + visual disturbances L eye.  RESPIRATORY: No cough, wheezing, chills or hemoptysis; No shortness of breath  CARDIOVASCULAR: No chest pain, palpitations, dizziness, or leg swelling  GASTROINTESTINAL: No abdominal or epigastric pain. No nausea, vomiting, or hematemesis; No diarrhea or constipation. No melena or hematochezia.  GENITOURINARY: No dysuria, frequency, hematuria, or incontinence  NEUROLOGICAL: No headaches, memory loss, loss of strength, numbness, or tremors  SKIN: No itching, burning, rashes, or lesions       MEDICATIONS  (STANDING):  amLODIPine   Tablet 10 milliGRAM(s) Oral daily  aspirin enteric coated 81 milliGRAM(s) Oral daily  hydrALAZINE 25 milliGRAM(s) Oral three times a day  influenza   Vaccine 0.5 milliLiter(s) IntraMuscular once    MEDICATIONS  (PRN):      Vital Signs Last 24 Hrs  T(C): 36.7 (2020 11:05), Max: 36.8 (2020 16:29)  T(F): 98.1 (2020 11:05), Max: 98.3 (2020 16:29)  HR: 105 (2020 12:55) (82 - 106)  BP: 186/101 (2020 12:55) (143/98 - 186/101)  BP(mean): --  RR: 18 (2020 11:05) (17 - 18)  SpO2: 100% (2020 11:05) (98% - 100%)      PHYSICAL EXAM:    GENERAL: NAD  NERVOUS SYSTEM:  Alert & Oriented X3, Motor Strength 5/5 B/L upper and lower extremities; Left eye impaired vision.  CHEST/LUNG: Clear to auscultation bilaterally  HEART: S1 S2+, Regular rate and rhythm  ABDOMEN: Soft, Nontender, Nondistended; Bowel sounds present  EXTREMITIES: No clubbing, cyanosis, or edema      Daily     Daily Weight in k.9 (2020 04:45)  I&O's Summary    2020 07:01  -  2020 14:27  --------------------------------------------------------  IN: 360 mL / OUT: 0 mL / NET: 360 mL                            13.4   7.41  )-----------( 291      ( 2020 01:36 )             40.3   02-06    145  |  110<H>  |  17  ----------------------------<  137<H>  4.0   |  28  |  1.51<H>    Ca    9.1      2020 01:36  Phos  3.4     02-06  Mg     2.1     02-06    TPro  7.5  /  Alb  3.1<L>  /  TBili  0.6  /  DBili  x   /  AST  18  /  ALT  38  /  AlkPhos  100  02-06  PT/INR - ( 2020 16:48 )   PT: 13.2 sec;   INR: 1.17 ratio         PTT - ( 2020 16:48 )  PTT:29.2 secCAPILLARY BLOOD GLUCOSE      CARDIAC MARKERS ( 2020 01:36 )  .129 ng/mL / x     / x     / x     / x      CARDIAC MARKERS ( 2020 16:48 )  .146 ng/mL / x     / x     / x     / x            RADIOLOGY & ADDITIONAL STUDIES:

## 2020-02-07 NOTE — PROGRESS NOTE ADULT - ASSESSMENT
Subjective Complaints:  Historian:             Vital Signs Last 24 Hrs  T(C): 36.6 (07 Feb 2020 16:55), Max: 36.8 (07 Feb 2020 04:45)  T(F): 97.9 (07 Feb 2020 16:55), Max: 98.3 (07 Feb 2020 04:45)  HR: 106 (07 Feb 2020 16:55) (82 - 106)  BP: 182/98 (07 Feb 2020 16:55) (143/98 - 186/101)  BP(mean): --  RR: 18 (07 Feb 2020 16:55) (17 - 18)  SpO2: 97% (07 Feb 2020 16:55) (97% - 100%)    GENERAL PHYSICAL EXAM:  General:  Appears stated age, well-groomed, well-nourished, no distress  HEENT:  NC/AT, patent nares w/ pink mucosa, OP clear w/o lesions, PERRL, EOMI, conjunctivae clear, no thyromegaly, nodules, adenopathy, no JVD  Chest:  Full & symmetric excursion, no increased effort, breath sounds clear  Cardiovascular:  Regular rhythm, S1, S2, no murmur/rub/S3/S4, no carotid/femoral/abdominal bruit, radial/pedal pulses 2+, no edema  Abdomen:  Soft, non-tender, non-distended, normoactive bowel sounds, no HSM  Extremities:  Gait & station:   Digits:   Nails:   Joints, Bones, Muscles:   ROM:   Stability:  Skin:  No rash/erythema/ecchymoses/petechiae/wounds/abscess/warm/dry  Musculoskeletal:  Full ROM in all joints w/o swelling/tenderness/effusion        LABS:                        13.4   7.41  )-----------( 291      ( 06 Feb 2020 01:36 )             40.3     02-06    145  |  110<H>  |  17  ----------------------------<  137<H>  4.0   |  28  |  1.51<H>    Ca    9.1      06 Feb 2020 01:36  Phos  3.4     02-06  Mg     2.1     02-06    TPro  7.5  /  Alb  3.1<L>  /  TBili  0.6  /  DBili  x   /  AST  18  /  ALT  38  /  AlkPhos  100  02-06          RADIOLOGY & ADDITIONAL STUDIES:        Neurology Progress Note:      Mental Status:  awaekmalert speech fluent       Cranial Nerves:  left eye vision impaired for 2 months       Motor:   arm leg 4/5         Sensory:intact      Cerebellar:defrd       Gait:  no ataxia       Assesment/Plan:  mri christiana noted and cta brain noted no acute path  htn non complianmce  left vision impaired for 2 months  on as a

## 2020-02-07 NOTE — CONSULT NOTE ADULT - SUBJECTIVE AND OBJECTIVE BOX
CHIEF COMPLAINT:  Patient is a 64y old  Male who presents with a chief complaint of Loss of vision in left eye (07 Feb 2020 14:26)      HPI:  65 YO M with HTN presents to ED with loss of vision in left eye x 2 months. Patient states he went to see and optometrist, and was sent to ED because his blood pressure was too high. Patient denies any other complaints. Denies dizziness, loss of consciousness, weakness, abnormal gait, chest pain, shortness of breath or palpitations. Pos troponin.    ALLERGIES    No Known Allergies  none    Home Medications:    PAST MEDICAL & SURGICAL HISTORY:  Gout  Hypertension  No significant past surgical history        FAMILY HISTORY:  na    SOCIAL HISTORY:  denies    REVIEW OF SYSTEMS:  General:  No wt loss, fevers, chills, night sweats  Eyes:  Good vision, no reported pain  ENT:  No sore throat, pain, runny nose, dysphagia  CV:  No pain, palpitations, hypo/hypertension  Resp:  No dyspnea, cough, tachypnea, wheezing  GI:  No pain, nausea, vomiting, diarrhea, constipation  :  No pain, bleeding, incontinence, nocturia  Muscle:  No pain, weakness  Breast:  No pain, abscess, mass, discharge  Neuro:  No weakness, tingling, memory problems  Psych:  No fatigue, insomnia, mood problems, depression  Endocrine:  No polyuria, polydipsia, cold/heat intolerance  Heme:  No petechiae, ecchymosis, easy bruisability  Skin:  No rash, edema    PHYSICAL EXAM:  Vital Signs:  Vital Signs Last 24 Hrs  T(C): 36.7 (07 Feb 2020 11:05), Max: 36.8 (06 Feb 2020 16:29)  T(F): 98.1 (07 Feb 2020 11:05), Max: 98.3 (06 Feb 2020 16:29)  HR: 82 (07 Feb 2020 13:57) (82 - 106)  BP: 165/89 (07 Feb 2020 13:57) (143/98 - 186/101)  RR: 18 (07 Feb 2020 11:05) (17 - 18)  SpO2: 100% (07 Feb 2020 11:05) (98% - 100%)  I&O's Summary    07 Feb 2020 07:01  -  07 Feb 2020 15:48  --------------------------------------------------------  IN: 360 mL / OUT: 0 mL / NET: 360 mL      I&O's Detail    07 Feb 2020 07:01  -  07 Feb 2020 15:48  --------------------------------------------------------  IN:    Oral Fluid: 360 mL  Total IN: 360 mL    OUT:  Total OUT: 0 mL    Total NET: 360 mL    Tele:     Constitutional: well developed, normal appearance, well groomed, well nourished, no deformities and no acute distress.   Eyes: the conjunctiva exhibited no abnormalities and the eyelids demonstrated no xanthelasmas.   HEENT: normal oral mucosa, no oral pallor and no oral cyanosis.   Neck: normal jugular venous A waves present, normal jugular venous V waves present and no jugular venous rascon A waves.   Pulmonary: no respiratory distress, normal respiratory rhythm and effort, no accessory muscle use and lungs were clear to auscultation bilaterally.   Cardiovascular: heart rate and rhythm were normal, normal S1 and S2 and no murmur, gallop, rub, heave or thrill are present.   Abdomen: soft, non-tender, no hepato-splenomegaly and no abdominal mass palpated.   Musculoskeletal: the gait could not be assessed..   Extremities: no clubbing of the fingernails, no localized cyanosis, no petechial hemorrhages and no ischemic changes.   Skin: normal skin color and pigmentation, no rash, no venous stasis, no skin lesions, no skin ulcer and no xanthoma was observed.   Psychiatric: oriented to person, place, and time, the affect was normal, the mood was normal and not feeling anxious.      LABORATORY:                          13.4   7.41  )-----------( 291      ( 06 Feb 2020 01:36 )             40.3     02-06    145  |  110<H>  |  17  ----------------------------<  137<H>  4.0   |  28  |  1.51<H>    Ca    9.1      06 Feb 2020 01:36  Phos  3.4     02-06  Mg     2.1     02-06    TPro  7.5  /  Alb  3.1<L>  /  TBili  0.6  /  DBili  x   /  AST  18  /  ALT  38  /  AlkPhos  100  02-06      CARDIAC MARKERS ( 06 Feb 2020 01:36 )  .129 ng/mL / x     / x     / x     / x      CARDIAC MARKERS ( 05 Feb 2020 16:48 )  .146 ng/mL / x     / x     / x     / x          LIVER FUNCTIONS - ( 06 Feb 2020 01:36 )  Alb: 3.1 g/dL / Pro: 7.5 gm/dL / ALK PHOS: 100 U/L / ALT: 38 U/L / AST: 18 U/L / GGT: x           PT/INR - ( 05 Feb 2020 16:48 )   PT: 13.2 sec;   INR: 1.17 ratio         PTT - ( 05 Feb 2020 16:48 )  PTT:29.2 sec    IMAGING:  < from: Xray Chest 1 View-PORTABLE IMMEDIATE (02.05.20 @ 17:27) >  IMPRESSION:    Cardiomegaly. Clear lungs.    < end of copied text >    < from: MR Angio Head No Cont (02.06.20 @ 09:57) >  IMPRESSION:     Mild scattered atherosclerotic changes and tortuosity. No significant stenosis or occlusion. Widely patent intracerebral vasculature.    < end of copied text >    < from: TTE Echo Doppler w/o Cont (02.06.20 @ 16:37) >   1. Left ventricular ejection fraction, by visual estimation, is 40 to 45%.   2. Mildly decreased global left ventricular systolic function.   3. Mid and apical anterior wall is abnormal as described above.   4. Global cardiomyopathy.   5. Spectral Doppler shows impaired relaxation pattern of left ventricular myocardial filling (Grade I diastolic dysfunction).   6. There is mild concentric left ventricular hypertrophy.   7. Normal right ventricular size and function.   8. There is no evidence of pericardial effusion.   9. Mild thickening and calcification of the anterior and posterior mitral valve leaflets.  10. Trace mitral valve regurgitation.    < end of copied text >    ASSESSMENT:  65 YO M with HTN presents to ED with loss of vision in left eye x 2 months. Patient states he went to see and optometrist, and was sent to ED because his blood pressure was too high. Patient denies any other complaints. Denies dizziness, loss of consciousness, weakness, abnormal gait, chest pain, shortness of breath or palpitations. Pos troponin. Left ventricular ejection fraction, by visual estimation, is 40 to 45%.Global cardiomyopathy. Grade I diastolic dysfunction. HTN urgency and troponin release likely stress cardiomyopathy vs. demand ischemia.      PLAN:       MEDICATIONS  (STANDING):  amLODIPine   Tablet 10 milliGRAM(s) Oral daily  aspirin enteric coated 81 milliGRAM(s) Oral daily  heparin  Injectable 5000 Unit(s) SubCutaneous three times a day  hydrALAZINE 25 milliGRAM(s) Oral three times a day  add coreg 12.5 BID  MPI for ischemic evaluation.  monitor labs, tele, vitals.    Rubén Zaman MD, FACC, FASE, FASNC, FACP  Director, Heart Failure Services  [facility]  , Department of Cardiology  [facility] CHIEF COMPLAINT:  Patient is a 64y old  Male who presents with a chief complaint of Loss of vision in left eye (07 Feb 2020 14:26)      HPI:  63 YO M with HTN presents to ED with loss of vision in left eye x 2 months. Patient states he went to see and optometrist, and was sent to ED because his blood pressure was too high. Patient denies any other complaints. Denies dizziness, loss of consciousness, weakness, abnormal gait, chest pain, shortness of breath or palpitations. Pos troponin.    ALLERGIES    No Known Allergies  none    Home Medications:    PAST MEDICAL & SURGICAL HISTORY:  Gout  Hypertension  No significant past surgical history        FAMILY HISTORY:  na    SOCIAL HISTORY:  denies    REVIEW OF SYSTEMS:  General:  No wt loss, fevers, chills, night sweats  Eyes:  Good vision, no reported pain  ENT:  No sore throat, pain, runny nose, dysphagia  CV:  No pain, palpitations, hypo/hypertension  Resp:  No dyspnea, cough, tachypnea, wheezing  GI:  No pain, nausea, vomiting, diarrhea, constipation  :  No pain, bleeding, incontinence, nocturia  Muscle:  No pain, weakness  Breast:  No pain, abscess, mass, discharge  Neuro:  No weakness, tingling, memory problems  Psych:  No fatigue, insomnia, mood problems, depression  Endocrine:  No polyuria, polydipsia, cold/heat intolerance  Heme:  No petechiae, ecchymosis, easy bruisability  Skin:  No rash, edema    PHYSICAL EXAM:  Vital Signs:  Vital Signs Last 24 Hrs  T(C): 36.7 (07 Feb 2020 11:05), Max: 36.8 (06 Feb 2020 16:29)  T(F): 98.1 (07 Feb 2020 11:05), Max: 98.3 (06 Feb 2020 16:29)  HR: 82 (07 Feb 2020 13:57) (82 - 106)  BP: 165/89 (07 Feb 2020 13:57) (143/98 - 186/101)  RR: 18 (07 Feb 2020 11:05) (17 - 18)  SpO2: 100% (07 Feb 2020 11:05) (98% - 100%)  I&O's Summary    07 Feb 2020 07:01  -  07 Feb 2020 15:48  --------------------------------------------------------  IN: 360 mL / OUT: 0 mL / NET: 360 mL      I&O's Detail    07 Feb 2020 07:01  -  07 Feb 2020 15:48  --------------------------------------------------------  IN:    Oral Fluid: 360 mL  Total IN: 360 mL    OUT:  Total OUT: 0 mL    Total NET: 360 mL    Tele: SR, brief pSVT    Constitutional: well developed, normal appearance, well groomed, well nourished, no deformities and no acute distress.   Eyes: the conjunctiva exhibited no abnormalities and the eyelids demonstrated no xanthelasmas.   HEENT: normal oral mucosa, no oral pallor and no oral cyanosis.   Neck: normal jugular venous A waves present, normal jugular venous V waves present and no jugular venous rascon A waves.   Pulmonary: no respiratory distress, normal respiratory rhythm and effort, no accessory muscle use and lungs were clear to auscultation bilaterally.   Cardiovascular: heart rate and rhythm were normal, normal S1 and S2 and no murmur, gallop, rub, heave or thrill are present.   Abdomen: soft, non-tender, no hepato-splenomegaly and no abdominal mass palpated.   Musculoskeletal: the gait could not be assessed.  Extremities: no clubbing of the fingernails, no localized cyanosis, no petechial hemorrhages and no ischemic changes.   Skin: normal skin color and pigmentation, no rash, no venous stasis, no skin lesions, no skin ulcer and no xanthoma was observed.   Psychiatric: oriented to person, place, and time, the affect was normal, the mood was normal and not feeling anxious.      LABORATORY:                          13.4   7.41  )-----------( 291      ( 06 Feb 2020 01:36 )             40.3     02-06    145  |  110<H>  |  17  ----------------------------<  137<H>  4.0   |  28  |  1.51<H>    Ca    9.1      06 Feb 2020 01:36  Phos  3.4     02-06  Mg     2.1     02-06    TPro  7.5  /  Alb  3.1<L>  /  TBili  0.6  /  DBili  x   /  AST  18  /  ALT  38  /  AlkPhos  100  02-06      CARDIAC MARKERS ( 06 Feb 2020 01:36 )  .129 ng/mL / x     / x     / x     / x      CARDIAC MARKERS ( 05 Feb 2020 16:48 )  .146 ng/mL / x     / x     / x     / x          LIVER FUNCTIONS - ( 06 Feb 2020 01:36 )  Alb: 3.1 g/dL / Pro: 7.5 gm/dL / ALK PHOS: 100 U/L / ALT: 38 U/L / AST: 18 U/L / GGT: x           PT/INR - ( 05 Feb 2020 16:48 )   PT: 13.2 sec;   INR: 1.17 ratio         PTT - ( 05 Feb 2020 16:48 )  PTT:29.2 sec    IMAGING:  < from: Xray Chest 1 View-PORTABLE IMMEDIATE (02.05.20 @ 17:27) >  IMPRESSION:    Cardiomegaly. Clear lungs.    < end of copied text >    < from: MR Angio Head No Cont (02.06.20 @ 09:57) >  IMPRESSION:     Mild scattered atherosclerotic changes and tortuosity. No significant stenosis or occlusion. Widely patent intracerebral vasculature.    < end of copied text >    < from: MR Angio Neck No Cont (02.06.20 @ 09:57) >  Suboptimal but grossly diagnostic study degraded by motion. Narrowing of the bulb and proximal internal carotid arteries is suggested bilaterally with approximately 40-50% stenosis. This may be further correlated with Doppler due to the motion.     < end of copied text >      < from: TTE Echo Doppler w/o Cont (02.06.20 @ 16:37) >   1. Left ventricular ejection fraction, by visual estimation, is 40 to 45%.   2. Mildly decreased global left ventricular systolic function.   3. Mid and apical anterior wall is abnormal as described above.   4. Global cardiomyopathy.   5. Spectral Doppler shows impaired relaxation pattern of left ventricular myocardial filling (Grade I diastolic dysfunction).   6. There is mild concentric left ventricular hypertrophy.   7. Normal right ventricular size and function.   8. There is no evidence of pericardial effusion.   9. Mild thickening and calcification of the anterior and posterior mitral valve leaflets.  10. Trace mitral valve regurgitation.    < end of copied text >    ASSESSMENT:  63 YO M with HTN presents to ED with loss of vision in left eye x 2 months. Patient states he went to see and optometrist, and was sent to ED because his blood pressure was too high. Patient denies any other complaints. Denies dizziness, loss of consciousness, weakness, abnormal gait, chest pain, shortness of breath or palpitations. Positive troponin. Left ventricular ejection fraction, by visual estimation, is 40 to 45%.Global cardiomyopathy. Grade I diastolic dysfunction. HTN urgency and troponin release likely stress cardiomyopathy vs. demand ischemia. B/L 40-50% carotid plaques noted.      PLAN:       MEDICATIONS  (STANDING):  amLODIPine   Tablet 10 milliGRAM(s) Oral daily  aspirin enteric coated 81 milliGRAM(s) Oral daily  heparin  Injectable 5000 Unit(s) SubCutaneous three times a day  hydrALAZINE 25 milliGRAM(s) Oral three times a day    add coreg 12.5 BID  add atorvastatin 40 mg daily.    MPI for ischemic evaluation.  monitor labs, tele, vitals.    Rubén Zaman MD, FACC, TARA, DIGNA, FACP  Director, Heart Failure Services  [facility]  , Department of Cardiology  [facility] CHIEF COMPLAINT:  Patient is a 64y old  Male who presents with a chief complaint of Loss of vision in left eye (07 Feb 2020 14:26)      HPI:  63 YO M with HTN presents to ED with loss of vision in left eye x 2 months. Patient states he went to see and optometrist, and was sent to ED because his blood pressure was too high. Patient denies any other complaints. Denies dizziness, loss of consciousness, weakness, abnormal gait, chest pain, shortness of breath or palpitations. Pos troponin. Notes out of BP meds x 2 months. No current complaints.    ALLERGIES    No Known Allergies  none    Home Medications:    PAST MEDICAL & SURGICAL HISTORY:  Gout  Hypertension  No significant past surgical history        FAMILY HISTORY:  na    SOCIAL HISTORY:  denies    REVIEW OF SYSTEMS:  General:  No wt loss, fevers, chills, night sweats  Eyes:  Good vision, no reported pain  ENT:  No sore throat, pain, runny nose, dysphagia  CV:  No pain, palpitations, hypo/hypertension  Resp:  No dyspnea, cough, tachypnea, wheezing  GI:  No pain, nausea, vomiting, diarrhea, constipation  :  No pain, bleeding, incontinence, nocturia  Muscle:  No pain, weakness  Breast:  No pain, abscess, mass, discharge  Neuro:  No weakness, tingling, memory problems  Psych:  No fatigue, insomnia, mood problems, depression  Endocrine:  No polyuria, polydipsia, cold/heat intolerance  Heme:  No petechiae, ecchymosis, easy bruisability  Skin:  No rash, edema    PHYSICAL EXAM:  Vital Signs:  Vital Signs Last 24 Hrs  T(C): 36.7 (07 Feb 2020 11:05), Max: 36.8 (06 Feb 2020 16:29)  T(F): 98.1 (07 Feb 2020 11:05), Max: 98.3 (06 Feb 2020 16:29)  HR: 82 (07 Feb 2020 13:57) (82 - 106)  BP: 165/89 (07 Feb 2020 13:57) (143/98 - 186/101)  RR: 18 (07 Feb 2020 11:05) (17 - 18)  SpO2: 100% (07 Feb 2020 11:05) (98% - 100%)  I&O's Summary    07 Feb 2020 07:01  -  07 Feb 2020 15:48  --------------------------------------------------------  IN: 360 mL / OUT: 0 mL / NET: 360 mL      I&O's Detail    07 Feb 2020 07:01  -  07 Feb 2020 15:48  --------------------------------------------------------  IN:    Oral Fluid: 360 mL  Total IN: 360 mL    OUT:  Total OUT: 0 mL    Total NET: 360 mL    Tele: SR, brief pSVT    Constitutional: well developed, normal appearance, well groomed, well nourished, no deformities and no acute distress.   Eyes: the conjunctiva exhibited no abnormalities and the eyelids demonstrated no xanthelasmas.   HEENT: normal oral mucosa, no oral pallor and no oral cyanosis.   Neck: normal jugular venous A waves present, normal jugular venous V waves present and no jugular venous rascon A waves.   Pulmonary: no respiratory distress, normal respiratory rhythm and effort, no accessory muscle use and lungs were clear to auscultation bilaterally.   Cardiovascular: heart rate and rhythm were normal, normal S1 and S2 and no murmur, gallop, rub, heave or thrill are present.   Abdomen: soft, non-tender, no hepato-splenomegaly and no abdominal mass palpated.   Musculoskeletal: the gait could not be assessed.  Extremities: no clubbing of the fingernails, no localized cyanosis, no petechial hemorrhages and no ischemic changes.   Skin: normal skin color and pigmentation, no rash, no venous stasis, no skin lesions, no skin ulcer and no xanthoma was observed.   Psychiatric: oriented to person, place, and time, the affect was normal, the mood was normal and not feeling anxious.      LABORATORY:                          13.4   7.41  )-----------( 291      ( 06 Feb 2020 01:36 )             40.3     02-06    145  |  110<H>  |  17  ----------------------------<  137<H>  4.0   |  28  |  1.51<H>    Ca    9.1      06 Feb 2020 01:36  Phos  3.4     02-06  Mg     2.1     02-06    TPro  7.5  /  Alb  3.1<L>  /  TBili  0.6  /  DBili  x   /  AST  18  /  ALT  38  /  AlkPhos  100  02-06      CARDIAC MARKERS ( 06 Feb 2020 01:36 )  .129 ng/mL / x     / x     / x     / x      CARDIAC MARKERS ( 05 Feb 2020 16:48 )  .146 ng/mL / x     / x     / x     / x          LIVER FUNCTIONS - ( 06 Feb 2020 01:36 )  Alb: 3.1 g/dL / Pro: 7.5 gm/dL / ALK PHOS: 100 U/L / ALT: 38 U/L / AST: 18 U/L / GGT: x           PT/INR - ( 05 Feb 2020 16:48 )   PT: 13.2 sec;   INR: 1.17 ratio         PTT - ( 05 Feb 2020 16:48 )  PTT:29.2 sec    IMAGING:  < from: Xray Chest 1 View-PORTABLE IMMEDIATE (02.05.20 @ 17:27) >  IMPRESSION:    Cardiomegaly. Clear lungs.    < end of copied text >    < from: MR Angio Head No Cont (02.06.20 @ 09:57) >  IMPRESSION:     Mild scattered atherosclerotic changes and tortuosity. No significant stenosis or occlusion. Widely patent intracerebral vasculature.    < end of copied text >    < from: MR Angio Neck No Cont (02.06.20 @ 09:57) >  Suboptimal but grossly diagnostic study degraded by motion. Narrowing of the bulb and proximal internal carotid arteries is suggested bilaterally with approximately 40-50% stenosis. This may be further correlated with Doppler due to the motion.     < end of copied text >      < from: TTE Echo Doppler w/o Cont (02.06.20 @ 16:37) >   1. Left ventricular ejection fraction, by visual estimation, is 40 to 45%.   2. Mildly decreased global left ventricular systolic function.   3. Mid and apical anterior wall is abnormal as described above.   4. Global cardiomyopathy.   5. Spectral Doppler shows impaired relaxation pattern of left ventricular myocardial filling (Grade I diastolic dysfunction).   6. There is mild concentric left ventricular hypertrophy.   7. Normal right ventricular size and function.   8. There is no evidence of pericardial effusion.   9. Mild thickening and calcification of the anterior and posterior mitral valve leaflets.  10. Trace mitral valve regurgitation.    < end of copied text >    ASSESSMENT:  63 YO M with HTN presents to ED with loss of vision in left eye x 2 months. Patient states he went to see and optometrist, and was sent to ED because his blood pressure was too high. Patient denies any other complaints. Denies dizziness, loss of consciousness, weakness, abnormal gait, chest pain, shortness of breath or palpitations. Positive troponin. Left ventricular ejection fraction, by visual estimation, is 40 to 45%.Global cardiomyopathy. Grade I diastolic dysfunction. HTN urgency and troponin release likely stress cardiomyopathy vs. demand ischemia. B/L 40-50% carotid plaques noted.      PLAN:       MEDICATIONS  (STANDING):  amLODIPine   Tablet 10 milliGRAM(s) Oral daily  aspirin enteric coated 81 milliGRAM(s) Oral daily  heparin  Injectable 5000 Unit(s) SubCutaneous three times a day  hydrALAZINE 25 milliGRAM(s) Oral three times a day    add coreg 12.5 BID  add atorvastatin 40 mg daily.    MPI for ischemic evaluation.  monitor labs, tele, vitals.    Rubén Zaman MD, FACC, FASE, FASNC, FACP  Director, Heart Failure Services  [facility]  , Department of Cardiology  [facility]

## 2020-02-08 LAB
ANION GAP SERPL CALC-SCNC: 8 MMOL/L — SIGNIFICANT CHANGE UP (ref 5–17)
BUN SERPL-MCNC: 23 MG/DL — SIGNIFICANT CHANGE UP (ref 7–23)
CALCIUM SERPL-MCNC: 9.4 MG/DL — SIGNIFICANT CHANGE UP (ref 8.5–10.1)
CHLORIDE SERPL-SCNC: 107 MMOL/L — SIGNIFICANT CHANGE UP (ref 96–108)
CO2 SERPL-SCNC: 25 MMOL/L — SIGNIFICANT CHANGE UP (ref 22–31)
CREAT SERPL-MCNC: 1.52 MG/DL — HIGH (ref 0.5–1.3)
GLUCOSE SERPL-MCNC: 133 MG/DL — HIGH (ref 70–99)
POTASSIUM SERPL-MCNC: 4.2 MMOL/L — SIGNIFICANT CHANGE UP (ref 3.5–5.3)
POTASSIUM SERPL-SCNC: 4.2 MMOL/L — SIGNIFICANT CHANGE UP (ref 3.5–5.3)
SODIUM SERPL-SCNC: 140 MMOL/L — SIGNIFICANT CHANGE UP (ref 135–145)
TROPONIN I SERPL-MCNC: 0.05 NG/ML — HIGH (ref 0.01–0.04)
TROPONIN I SERPL-MCNC: 0.07 NG/ML — HIGH (ref 0.01–0.04)

## 2020-02-08 PROCEDURE — 99233 SBSQ HOSP IP/OBS HIGH 50: CPT

## 2020-02-08 PROCEDURE — 93880 EXTRACRANIAL BILAT STUDY: CPT | Mod: 26

## 2020-02-08 PROCEDURE — 76775 US EXAM ABDO BACK WALL LIM: CPT | Mod: 26

## 2020-02-08 PROCEDURE — 76770 US EXAM ABDO BACK WALL COMP: CPT | Mod: 26

## 2020-02-08 RX ORDER — CARVEDILOL PHOSPHATE 80 MG/1
25 CAPSULE, EXTENDED RELEASE ORAL EVERY 12 HOURS
Refills: 0 | Status: DISCONTINUED | OUTPATIENT
Start: 2020-02-08 | End: 2020-02-08

## 2020-02-08 RX ORDER — CARVEDILOL PHOSPHATE 80 MG/1
12.5 CAPSULE, EXTENDED RELEASE ORAL EVERY 12 HOURS
Refills: 0 | Status: DISCONTINUED | OUTPATIENT
Start: 2020-02-08 | End: 2020-02-09

## 2020-02-08 RX ADMIN — Medication 25 MILLIGRAM(S): at 13:28

## 2020-02-08 RX ADMIN — Medication 81 MILLIGRAM(S): at 12:24

## 2020-02-08 RX ADMIN — AMLODIPINE BESYLATE 10 MILLIGRAM(S): 2.5 TABLET ORAL at 05:44

## 2020-02-08 RX ADMIN — ATORVASTATIN CALCIUM 40 MILLIGRAM(S): 80 TABLET, FILM COATED ORAL at 21:38

## 2020-02-08 RX ADMIN — CARVEDILOL PHOSPHATE 12.5 MILLIGRAM(S): 80 CAPSULE, EXTENDED RELEASE ORAL at 05:44

## 2020-02-08 RX ADMIN — CARVEDILOL PHOSPHATE 12.5 MILLIGRAM(S): 80 CAPSULE, EXTENDED RELEASE ORAL at 17:04

## 2020-02-08 RX ADMIN — Medication 25 MILLIGRAM(S): at 05:44

## 2020-02-08 RX ADMIN — HEPARIN SODIUM 5000 UNIT(S): 5000 INJECTION INTRAVENOUS; SUBCUTANEOUS at 05:44

## 2020-02-08 RX ADMIN — HEPARIN SODIUM 5000 UNIT(S): 5000 INJECTION INTRAVENOUS; SUBCUTANEOUS at 21:39

## 2020-02-08 RX ADMIN — HEPARIN SODIUM 5000 UNIT(S): 5000 INJECTION INTRAVENOUS; SUBCUTANEOUS at 13:27

## 2020-02-08 RX ADMIN — Medication 25 MILLIGRAM(S): at 21:38

## 2020-02-08 NOTE — PROGRESS NOTE ADULT - SUBJECTIVE AND OBJECTIVE BOX
Patient is a 64y old  Male who presents with a chief complaint of Loss of vision in left eye (07 Feb 2020 19:07)      INTERVAL HPI/OVERNIGHT EVENTS: no events     MEDICATIONS  (STANDING):  amLODIPine   Tablet 10 milliGRAM(s) Oral daily  aspirin enteric coated 81 milliGRAM(s) Oral daily  atorvastatin 40 milliGRAM(s) Oral at bedtime  carvedilol 12.5 milliGRAM(s) Oral every 12 hours  heparin  Injectable 5000 Unit(s) SubCutaneous three times a day  hydrALAZINE 25 milliGRAM(s) Oral three times a day  influenza   Vaccine 0.5 milliLiter(s) IntraMuscular once  regadenoson Injectable 0.4 milliGRAM(s) IV Push once    MEDICATIONS  (PRN):      Allergies    No Known Allergies    Intolerances         Vital Signs Last 24 Hrs  T(C): 36.2 (08 Feb 2020 10:35), Max: 36.7 (07 Feb 2020 11:05)  T(F): 97.2 (08 Feb 2020 10:35), Max: 98.1 (07 Feb 2020 11:05)  HR: 83 (08 Feb 2020 10:35) (81 - 106)  BP: 149/95 (08 Feb 2020 10:35) (149/95 - 186/101)  BP(mean): --  RR: 16 (08 Feb 2020 10:35) (16 - 18)  SpO2: 96% (08 Feb 2020 10:35) (96% - 100%)    PHYSICAL EXAM:  GENERAL: NAD, well-groomed, well-developed  HEAD:  Atraumatic, Normocephalic  EYES: EOMI, PERRLA, conjunctiva and sclera clear  ENMT: No tonsillar erythema, exudates, or enlargement; Moist mucous membranes, Good dentition, No lesions  NECK: Supple, No JVD, Normal thyroid  NERVOUS SYSTEM:  Alert & Oriented X3, Good concentration; Motor Strength 5/5 B/L upper and lower extremities; DTRs 2+ intact and symmetric  CHEST/LUNG: Clear to percussion bilaterally; No rales, rhonchi, wheezing, or rubs  HEART: Regular rate and rhythm; No murmurs, rubs, or gallops  ABDOMEN: Soft, Nontender, Nondistended; Bowel sounds present  EXTREMITIES:  2+ Peripheral Pulses, No clubbing, cyanosis, or edema  LYMPH: No lymphadenopathy noted  SKIN: No rashes or lesions    LABS:    02-08    140  |  107  |  23  ----------------------------<  133<H>  4.2   |  25  |  1.52<H>    Ca    9.4      08 Feb 2020 08:01          CAPILLARY BLOOD GLUCOSE          RADIOLOGY & ADDITIONAL TESTS:    Imaging Personally Reviewed:  [ X] YES  [ ] NO    Consultant(s) Notes Reviewed:  [ X] YES  [ ] NO    Care Discussed with Consultants/Other Providers [X ] YES  [ ] NO

## 2020-02-08 NOTE — PHYSICAL THERAPY INITIAL EVALUATION ADULT - GENERAL OBSERVATIONS, REHAB EVAL
Pt was seen in supine c cardiac monitor donned, alert and Ox4. Pt was comfortable and motivated. Vision of L eye was impaired.

## 2020-02-08 NOTE — PROGRESS NOTE ADULT - ASSESSMENT
63 YO M with a sig PMHx of HTN presents to ED with loss of vision in left eye x 2 months, Pt was seen by an ophthalmologist as outpt and was referred to a Retina specialist but he was sent to the ER from there for high BP.    #Left eye vision Impairment  c/w Aspirin  MRI showed chronic ischemic changes, No acute ischemia  f/u carotid doppler  Neurology on board.    Troponin elevated  0.146 -> 0.129  could be sec to stress cardiomyopathy vs demand  trend trop  Echo- EF 40- 45%, Mildly dec global L ventricular systolic function  c/w ASA, coreg, lipitor    stress test on monday  cardiology on board.      Uncontrolled HTN  increased hydralazine to 25mg Q8H  c/w amlodipine, coreg was added yesterday, monitor     CANDICE  unknown baseline  cr improving  f/u bmp  f/u Renal US    Preventive measure  Heparin SQ

## 2020-02-08 NOTE — PHYSICAL THERAPY INITIAL EVALUATION ADULT - PERTINENT HX OF CURRENT PROBLEM, REHAB EVAL
65 YO M with a sig PMHx of HTN presents to ED with loss of vision in left eye x 2 months. Patient states he went to see and optometrist today, and was sent to ED because his blood pressure was too high. Patient denies any other complaints. Denies dizziness, loss of consciousness, weakness, abnormal gait, chest pain, shortness of breath or palpitations

## 2020-02-09 PROCEDURE — 99232 SBSQ HOSP IP/OBS MODERATE 35: CPT

## 2020-02-09 PROCEDURE — 99233 SBSQ HOSP IP/OBS HIGH 50: CPT

## 2020-02-09 RX ORDER — ISOSORBIDE MONONITRATE 60 MG/1
30 TABLET, EXTENDED RELEASE ORAL DAILY
Refills: 0 | Status: DISCONTINUED | OUTPATIENT
Start: 2020-02-09 | End: 2020-02-12

## 2020-02-09 RX ORDER — CARVEDILOL PHOSPHATE 80 MG/1
25 CAPSULE, EXTENDED RELEASE ORAL EVERY 12 HOURS
Refills: 0 | Status: DISCONTINUED | OUTPATIENT
Start: 2020-02-09 | End: 2020-02-12

## 2020-02-09 RX ADMIN — Medication 25 MILLIGRAM(S): at 21:28

## 2020-02-09 RX ADMIN — CARVEDILOL PHOSPHATE 12.5 MILLIGRAM(S): 80 CAPSULE, EXTENDED RELEASE ORAL at 06:01

## 2020-02-09 RX ADMIN — Medication 81 MILLIGRAM(S): at 11:57

## 2020-02-09 RX ADMIN — AMLODIPINE BESYLATE 10 MILLIGRAM(S): 2.5 TABLET ORAL at 06:01

## 2020-02-09 RX ADMIN — Medication 25 MILLIGRAM(S): at 06:12

## 2020-02-09 RX ADMIN — HEPARIN SODIUM 5000 UNIT(S): 5000 INJECTION INTRAVENOUS; SUBCUTANEOUS at 14:40

## 2020-02-09 RX ADMIN — Medication 25 MILLIGRAM(S): at 14:40

## 2020-02-09 RX ADMIN — CARVEDILOL PHOSPHATE 25 MILLIGRAM(S): 80 CAPSULE, EXTENDED RELEASE ORAL at 17:14

## 2020-02-09 RX ADMIN — ATORVASTATIN CALCIUM 40 MILLIGRAM(S): 80 TABLET, FILM COATED ORAL at 21:28

## 2020-02-09 RX ADMIN — HEPARIN SODIUM 5000 UNIT(S): 5000 INJECTION INTRAVENOUS; SUBCUTANEOUS at 21:28

## 2020-02-09 RX ADMIN — ISOSORBIDE MONONITRATE 30 MILLIGRAM(S): 60 TABLET, EXTENDED RELEASE ORAL at 11:57

## 2020-02-09 RX ADMIN — HEPARIN SODIUM 5000 UNIT(S): 5000 INJECTION INTRAVENOUS; SUBCUTANEOUS at 06:01

## 2020-02-09 NOTE — PROGRESS NOTE ADULT - SUBJECTIVE AND OBJECTIVE BOX
CHIEF COMPLAINT:  Patient is a 64y old  Male who presents with a chief complaint of Loss of vision in left eye (07 Feb 2020 14:26)      HPI:  63 YO M with HTN presents to ED with loss of vision in left eye x 2 months. Patient states he went to see and optometrist, and was sent to ED because his blood pressure was too high. Patient denies any other complaints. Denies dizziness, loss of consciousness, weakness, abnormal gait, chest pain, shortness of breath or palpitations. Pos troponin. Notes out of BP meds x 2 months. No current complaints.    ALLERGIES    No Known Allergies  none    Home Medications:    PAST MEDICAL & SURGICAL HISTORY:  Gout  Hypertension  No significant past surgical history        FAMILY HISTORY:  na    SOCIAL HISTORY:  denies    MEDICATIONS  (STANDING):  amLODIPine   Tablet 10 milliGRAM(s) Oral daily  aspirin enteric coated 81 milliGRAM(s) Oral daily  atorvastatin 40 milliGRAM(s) Oral at bedtime  carvedilol 12.5 milliGRAM(s) Oral every 12 hours  heparin  Injectable 5000 Unit(s) SubCutaneous three times a day  hydrALAZINE 25 milliGRAM(s) Oral three times a day  influenza   Vaccine 0.5 milliLiter(s) IntraMuscular once  regadenoson Injectable 0.4 milliGRAM(s) IV Push once      PHYSICAL EXAM:  Vital Signs Last 24 Hrs  T(C): 36.1 (09 Feb 2020 04:51), Max: 36.3 (08 Feb 2020 17:13)  T(F): 97 (09 Feb 2020 04:51), Max: 97.4 (08 Feb 2020 17:13)  HR: 72 (09 Feb 2020 04:51) (72 - 83)  BP: 148/91 (09 Feb 2020 04:51) (148/91 - 183/110)  BP(mean): 126 (08 Feb 2020 20:43) (126 - 126)  RR: 17 (09 Feb 2020 04:51) (16 - 18)  SpO2: 97% (09 Feb 2020 04:51) (96% - 97%)    Constitutional: well developed, normal appearance, well groomed, well nourished, no deformities and no acute distress.   Eyes: the conjunctiva exhibited no abnormalities and the eyelids demonstrated no xanthelasmas.   HEENT: normal oral mucosa, no oral pallor and no oral cyanosis.   Neck: normal jugular venous A waves present, normal jugular venous V waves present and no jugular venous rascon A waves.   Pulmonary: no respiratory distress, normal respiratory rhythm and effort, no accessory muscle use and lungs were clear to auscultation bilaterally.   Cardiovascular: heart rate and rhythm were normal, normal S1 and S2 and no murmur, gallop, rub, heave or thrill are present.   Abdomen: soft, non-tender, no hepato-splenomegaly and no abdominal mass palpated.   Musculoskeletal: the gait could not be assessed.  Extremities: no clubbing of the fingernails, no localized cyanosis, no petechial hemorrhages and no ischemic changes.   Skin: normal skin color and pigmentation, no rash, no venous stasis, no skin lesions, no skin ulcer and no xanthoma was observed.   Psychiatric: oriented to person, place, and time, the affect was normal, the mood was normal and not feeling anxious.      LABORATORY:                            13.4   7.41  )-----------( 291      ( 06 Feb 2020 01:36 )             40.3     02-08    140  |  107  |  23  ----------------------------<  133<H>  4.2   |  25  |  1.52<H>    Ca    9.4      08 Feb 2020 08:01    02-06    145  |  110<H>  |  17  ----------------------------<  137<H>  4.0   |  28  |  1.51<H>    Ca    9.1      06 Feb 2020 01:36  Phos  3.4     02-06  Mg     2.1     02-06    TPro  7.5  /  Alb  3.1<L>  /  TBili  0.6  /  DBili  x   /  AST  18  /  ALT  38  /  AlkPhos  100  02-06      CARDIAC MARKERS ( 06 Feb 2020 01:36 )  .129 ng/mL / x     / x     / x     / x      CARDIAC MARKERS ( 05 Feb 2020 16:48 )  .146 ng/mL / x     / x     / x     / x          LIVER FUNCTIONS - ( 06 Feb 2020 01:36 )  Alb: 3.1 g/dL / Pro: 7.5 gm/dL / ALK PHOS: 100 U/L / ALT: 38 U/L / AST: 18 U/L / GGT: x           PT/INR - ( 05 Feb 2020 16:48 )   PT: 13.2 sec;   INR: 1.17 ratio         PTT - ( 05 Feb 2020 16:48 )  PTT:29.2 sec    IMAGING:  < from: Xray Chest 1 View-PORTABLE IMMEDIATE (02.05.20 @ 17:27) >  IMPRESSION:    Cardiomegaly. Clear lungs.    < end of copied text >    < from: MR Angio Head No Cont (02.06.20 @ 09:57) >  IMPRESSION:     Mild scattered atherosclerotic changes and tortuosity. No significant stenosis or occlusion. Widely patent intracerebral vasculature.    < end of copied text >    < from: MR Angio Neck No Cont (02.06.20 @ 09:57) >  Suboptimal but grossly diagnostic study degraded by motion. Narrowing of the bulb and proximal internal carotid arteries is suggested bilaterally with approximately 40-50% stenosis. This may be further correlated with Doppler due to the motion.     < end of copied text >      < from: TTE Echo Doppler w/o Cont (02.06.20 @ 16:37) >   1. Left ventricular ejection fraction, by visual estimation, is 40 to 45%.   2. Mildly decreased global left ventricular systolic function.   3. Mid and apical anterior wall is abnormal as described above.   4. Global cardiomyopathy.   5. Spectral Doppler shows impaired relaxation pattern of left ventricular myocardial filling (Grade I diastolic dysfunction).   6. There is mild concentric left ventricular hypertrophy.   7. Normal right ventricular size and function.   8. There is no evidence of pericardial effusion.   9. Mild thickening and calcification of the anterior and posterior mitral valve leaflets.  10. Trace mitral valve regurgitation.    < end of copied text >

## 2020-02-09 NOTE — PROGRESS NOTE ADULT - ASSESSMENT
65 YO M with a sig PMHx of HTN presents to ED with loss of vision in left eye x 2 months, Pt was seen by an ophthalmologist as outpt and was referred to a Retina specialist but he was sent to the ER from there for high BP.    #Left eye vision Impairment  c/w Aspirin  MRI showed chronic ischemic changes, No acute ischemia  f/u carotid doppler  Neurology on board.    Troponin elevated  Stress test in AM  0.146 -> 0.129  could be sec to stress cardiomyopathy vs demand  trend trop  Echo- EF 40- 45%, Mildly dec global L ventricular systolic function  c/w ASA, coreg, lipitor    stress test on monday  cardiology on board.      Uncontrolled HTN  increased hydralazine to 25mg Q8H  c/w amlodipine, coreg was added yesterday, monitor     CANDICE  unknown baseline  cr improving  f/u bmp  f/u Renal US    Preventive measure  Heparin SQ

## 2020-02-09 NOTE — PROGRESS NOTE ADULT - ASSESSMENT
63 YO M with HTN presents to ED with "loss of vision" in left eye x 2 months. Elevated BP on admission. Positive troponin. Left ventricular ejection fraction, by visual estimation, is 40 to 45%.Global cardiomyopathy. Grade I diastolic dysfunction.     HTN urgency and troponin release likely stress cardiomyopathy vs. demand ischemia.     PLAN:     Scheduled stress test for risk stratification (if no CVA as per neurology).  Empirically started on statin and ASA.  BP control - on low dose bbl, will titrate. No ACEI/ARB sec to renal insufficiency. On Hydralazine, add nitrates.  Vision loss still unexplained - needs neuro-ophthalmology examination. Need for DOAC? 65 YO M with HTN presents to ED with "loss of vision" in left eye x 2 months. Elevated BP on admission. Positive troponin. Left ventricular ejection fraction, by visual estimation, is 40 to 45%.Global cardiomyopathy. Grade I diastolic dysfunction.     HTN urgency and troponin release likely stress cardiomyopathy vs. demand ischemia.     PLAN:     Scheduled stress test for risk stratification (if no CVA as per neurology).  Empirically started on statin and ASA.  BP control - on low dose bbl, will titrate. No ACEI/ARB sec to renal insufficiency. On Hydralazine, add nitrates.  Vision loss still unexplained, as per patient retinal issues? - needs neuro-ophthalmology examination. If embolic. need for DOAC?

## 2020-02-09 NOTE — PROGRESS NOTE ADULT - SUBJECTIVE AND OBJECTIVE BOX
Patient is a 64y old  Male who presents with a chief complaint of Loss of vision in left eye (09 Feb 2020 08:33)      INTERVAL HPI/OVERNIGHT EVENTS: no events     MEDICATIONS  (STANDING):  amLODIPine   Tablet 10 milliGRAM(s) Oral daily  aspirin enteric coated 81 milliGRAM(s) Oral daily  atorvastatin 40 milliGRAM(s) Oral at bedtime  carvedilol 25 milliGRAM(s) Oral every 12 hours  heparin  Injectable 5000 Unit(s) SubCutaneous three times a day  hydrALAZINE 25 milliGRAM(s) Oral three times a day  influenza   Vaccine 0.5 milliLiter(s) IntraMuscular once  isosorbide   mononitrate ER Tablet (IMDUR) 30 milliGRAM(s) Oral daily  regadenoson Injectable 0.4 milliGRAM(s) IV Push once    MEDICATIONS  (PRN):      Allergies    No Known Allergies    Intolerances        REVIEW OF SYSTEMS:  CONSTITUTIONAL: No fever, weight loss, or fatigue  EYES: No eye pain, visual disturbances, or discharge  ENMT:  No difficulty hearing, tinnitus, vertigo; No sinus or throat pain  NECK: No pain or stiffness  BREASTS: No pain, masses, or nipple discharge  RESPIRATORY: No cough, wheezing, chills or hemoptysis; No shortness of breath  CARDIOVASCULAR: No chest pain, palpitations, dizziness, or leg swelling  GASTROINTESTINAL: No abdominal or epigastric pain. No nausea, vomiting, or hematemesis; No diarrhea or constipation. No melena or hematochezia.  GENITOURINARY: No dysuria, frequency, hematuria, or incontinence  NEUROLOGICAL: No headaches, memory loss, loss of strength, numbness, or tremors  SKIN: No itching, burning, rashes, or lesions   LYMPH NODES: No enlarged glands  ENDOCRINE: No heat or cold intolerance; No hair loss  MUSCULOSKELETAL: No joint pain or swelling; No muscle, back, or extremity pain  PSYCHIATRIC: No depression, anxiety, mood swings, or difficulty sleeping  HEME/LYMPH: No easy bruising, or bleeding gums  ALLERGY AND IMMUNOLOGIC: No hives or eczema    Vital Signs Last 24 Hrs  T(C): 36.1 (09 Feb 2020 10:28), Max: 36.3 (08 Feb 2020 17:13)  T(F): 97 (09 Feb 2020 10:28), Max: 97.4 (08 Feb 2020 17:13)  HR: 76 (09 Feb 2020 10:28) (72 - 83)  BP: 137/74 (09 Feb 2020 10:28) (137/74 - 183/110)  BP(mean): 126 (08 Feb 2020 20:43) (126 - 126)  RR: 17 (09 Feb 2020 10:28) (17 - 18)  SpO2: 99% (09 Feb 2020 10:28) (96% - 99%)    PHYSICAL EXAM:  GENERAL: NAD, well-groomed, well-developed  HEAD:  Atraumatic, Normocephalic  EYES: EOMI, PERRLA, conjunctiva and sclera clear  ENMT: No tonsillar erythema, exudates, or enlargement; Moist mucous membranes, Good dentition, No lesions  NECK: Supple, No JVD, Normal thyroid  NERVOUS SYSTEM:  Alert & Oriented X3, Good concentration; Motor Strength 5/5 B/L upper and lower extremities; DTRs 2+ intact and symmetric  CHEST/LUNG: Clear to percussion bilaterally; No rales, rhonchi, wheezing, or rubs  HEART: Regular rate and rhythm; No murmurs, rubs, or gallops  ABDOMEN: Soft, Nontender, Nondistended; Bowel sounds present  EXTREMITIES:  2+ Peripheral Pulses, No clubbing, cyanosis, or edema  LYMPH: No lymphadenopathy noted  SKIN: No rashes or lesions    LABS:    02-08    140  |  107  |  23  ----------------------------<  133<H>  4.2   |  25  |  1.52<H>    Ca    9.4      08 Feb 2020 08:01          CAPILLARY BLOOD GLUCOSE          RADIOLOGY & ADDITIONAL TESTS:    Imaging Personally Reviewed:  [ X] YES  [ ] NO    Consultant(s) Notes Reviewed:  [ X] YES  [ ] NO    Care Discussed with Consultants/Other Providers [X ] YES  [ ] NO

## 2020-02-10 PROCEDURE — 78452 HT MUSCLE IMAGE SPECT MULT: CPT | Mod: 26

## 2020-02-10 PROCEDURE — 99232 SBSQ HOSP IP/OBS MODERATE 35: CPT

## 2020-02-10 PROCEDURE — 99233 SBSQ HOSP IP/OBS HIGH 50: CPT

## 2020-02-10 RX ADMIN — HEPARIN SODIUM 5000 UNIT(S): 5000 INJECTION INTRAVENOUS; SUBCUTANEOUS at 13:33

## 2020-02-10 RX ADMIN — ATORVASTATIN CALCIUM 40 MILLIGRAM(S): 80 TABLET, FILM COATED ORAL at 21:47

## 2020-02-10 RX ADMIN — HEPARIN SODIUM 5000 UNIT(S): 5000 INJECTION INTRAVENOUS; SUBCUTANEOUS at 21:47

## 2020-02-10 RX ADMIN — AMLODIPINE BESYLATE 10 MILLIGRAM(S): 2.5 TABLET ORAL at 05:49

## 2020-02-10 RX ADMIN — Medication 81 MILLIGRAM(S): at 13:32

## 2020-02-10 RX ADMIN — ISOSORBIDE MONONITRATE 30 MILLIGRAM(S): 60 TABLET, EXTENDED RELEASE ORAL at 13:32

## 2020-02-10 RX ADMIN — Medication 25 MILLIGRAM(S): at 13:32

## 2020-02-10 RX ADMIN — Medication 25 MILLIGRAM(S): at 05:49

## 2020-02-10 RX ADMIN — Medication 25 MILLIGRAM(S): at 21:47

## 2020-02-10 RX ADMIN — HEPARIN SODIUM 5000 UNIT(S): 5000 INJECTION INTRAVENOUS; SUBCUTANEOUS at 05:49

## 2020-02-10 RX ADMIN — CARVEDILOL PHOSPHATE 25 MILLIGRAM(S): 80 CAPSULE, EXTENDED RELEASE ORAL at 17:09

## 2020-02-10 RX ADMIN — REGADENOSON 0.4 MILLIGRAM(S): 0.08 INJECTION, SOLUTION INTRAVENOUS at 12:18

## 2020-02-10 NOTE — PROGRESS NOTE ADULT - ASSESSMENT
63 YO M with HTN presents to ED with "loss of vision" in left eye x 2 months. Elevated BP on admission. Positive troponin. Left ventricular ejection fraction, by visual estimation, is 40 to 45%.Global cardiomyopathy. Grade I diastolic dysfunction.   HTN urgency and troponin release likely stress cardiomyopathy vs. demand ischemia.     PLAN:     stress test: EF 28%,  There is scintigraphic evidence for an extensive RCA territory myocardial infarct, no noted ischemia.  Cont on statin and ASA.  BP control - on bbl/imdur/hydralazine/norvasc. No ACE-I/ARB sec to renal insufficiency  Vision loss still unexplained, No acute findings in CT/MRI brain, neuro following 65 YO M with HTN presents to ED with "loss of vision" in left eye x 2 months. Elevated BP on admission. Positive troponin. Left ventricular ejection fraction, by visual estimation, is 40 to 45%.Global cardiomyopathy. Grade I diastolic dysfunction.   HTN urgency and troponin release likely stress cardiomyopathy vs. demand ischemia.     PLAN:     stress test: EF 28%,  There is scintigraphic evidence for an extensive RCA territory myocardial infarct, no noted ischemia.  Cont on statin and ASA.  BP control - on bbl/imdur/hydralazine/norvasc. No ACE-I/ARB sec to renal insufficiency  Vision loss still unexplained, No acute findings in CT/MRI brain, neuro following   Will discuss stress test report with interventional cardiology   Activity as tolerated 63 YO M with HTN presents to ED with "loss of vision" in left eye x 2 months. Elevated BP on admission. Positive troponin. Left ventricular ejection fraction, by visual estimation, is 40 to 45%.Global cardiomyopathy. Grade I diastolic dysfunction.   HTN urgency and troponin release likely stress cardiomyopathy vs. demand ischemia.     PLAN:     stress test: EF 28%,  There is scintigraphic evidence for an extensive RCA territory myocardial infarct, no noted ischemia.  Cont on statin and ASA.  BP control - on bbl/imdur/hydralazine/norvasc. No ACE-I/ARB sec to renal insufficiency.   Vision loss still unexplained, No acute findings in CT/MRI brain, neuro following

## 2020-02-10 NOTE — SPEECH LANGUAGE PATHOLOGY EVALUATION - COMMENTS
PMH HTN presents to ED with loss of vision in left eye x 2 months, Pt was seen by an ophthalmologist as outpt and was referred to a Retina specialist but he was sent to the ER from there for high BP.  2/6 MRI brain scattered chronic ischemic changes in both hemispheres; no lesion noted cooperative and maintained interactions able to provide background information not tested able to describe home and routine day in sequential format Pt denied any changes in vocal quality

## 2020-02-10 NOTE — PROGRESS NOTE ADULT - ASSESSMENT
63 YO M with a sig PMHx of HTN presents to ED with loss of vision in left eye x 2 months, Pt was seen by an ophthalmologist as outpt and was referred to a Retina specialist but he was sent to the ER from there for high BP.    #Left eye vision Impairment  c/w Aspirin, lipitor  MRI showed chronic ischemic changes, No acute ischemia  carotid doppler- Ext b/l intimal thickening and diffuse soft plaque, No elevated velocities to suggest hemodynamically significant stenosis.  Neurology on board.    Troponin elevated  stress test today  0.146 -> 0.129  could be sec to stress cardiomyopathy vs demand  trend trop  Echo- EF 40- 45%, Mildly dec global L ventricular systolic function  c/w ASA, coreg, lipitor  cardiology on board.      Uncontrolled HTN  increased hydralazine to 25mg Q8H  c/w amlodipine, coreg was added yesterday, monitor     CANDICE  unknown baseline  cr around 1.5  f/u bmp  Renal US- Normal    Preventive measure  Heparin SQ

## 2020-02-10 NOTE — SPEECH LANGUAGE PATHOLOGY EVALUATION - SLP DIAGNOSIS
Pt is a 64 yr old male who presents with functional receptive and expressive language skills; speech intelligibility is good with no evidence of dysarthria at this time

## 2020-02-10 NOTE — PROGRESS NOTE ADULT - ATTENDING COMMENTS
Cardiomyopathy (mild) with no evidence of active ischemia.  If vision loss acute consider neuro-ophthalmology examination.  BP adequate for now.  Can d/c home from CV perspective. f/u with PMD within one week for BP med titration.  Will follow as needed.

## 2020-02-10 NOTE — PROGRESS NOTE ADULT - SUBJECTIVE AND OBJECTIVE BOX
Patient is a 64y old  Male who presents with a chief complaint of Loss of vision in left eye (10 Feb 2020 16:51)    PAST MEDICAL & SURGICAL HISTORY:  Gout  Hypertension  No significant past surgical history    INTERVAL HISTORY: Resting in bed, not in any acute distress   	  MEDICATIONS:  MEDICATIONS  (STANDING):  amLODIPine   Tablet 10 milliGRAM(s) Oral daily  aspirin enteric coated 81 milliGRAM(s) Oral daily  atorvastatin 40 milliGRAM(s) Oral at bedtime  carvedilol 25 milliGRAM(s) Oral every 12 hours  heparin  Injectable 5000 Unit(s) SubCutaneous three times a day  hydrALAZINE 25 milliGRAM(s) Oral three times a day  influenza   Vaccine 0.5 milliLiter(s) IntraMuscular once  isosorbide   mononitrate ER Tablet (IMDUR) 30 milliGRAM(s) Oral daily      Vitals:  T(F): 97.1 (02-10-20 @ 16:49), Max: 97.4 (02-10-20 @ 04:49)  HR: 86 (02-10-20 @ 16:49) (74 - 86)  BP: 157/97 (02-10-20 @ 16:49) (117/67 - 157/97)  RR: 17 (02-10-20 @ 16:49) (16 - 18)  SpO2: 97% (02-10-20 @ 16:49) (97% - 98%)  Wt(kg): -- 110. 2kg    02-09 @ 07:01  -  02-10 @ 07:00  --------------------------------------------------------  IN:    Oral Fluid: 631 mL  Total IN: 631 mL    OUT:  Total OUT: 0 mL    Total NET: 631 mL    PHYSICAL EXAM:  Neuro: Awake, responsive  CV: S1 S2 RRR  Lungs: CTABL  GI: Soft, BS +, ND, NT  Extremities: No edema    TELEMETRY: RSR    RADIOLOGY: < from: US Duplex Carotid Arteries Complete, Bilateral (02.08.20 @ 09:16) >    Extensive bilateral intimal thickening and diffuse soft plaque..    No elevated velocities to suggest hemodynamically significant stenosis.    < end of copied text >  < from: CT Head No Cont (02.05.20 @ 17:51) >  1)  unremarkable CT study of the brain  2)  scattered mild mucosal thickening in the sinuses without fluid levels. Clear mastoids.    < end of copied text >  < from: MR Head No Cont (02.06.20 @ 09:57) >  1)  scattered chronic ischemic changes in both hemispheres as well as within the posterior fossa. No diffusion restriction or MR evidence of acute ischemia..  2)  no space-occupying lesion noted    < end of copied text >    DIAGNOSTIC TESTING:    [x ] Echocardiogram: < from: TTE Echo Doppler w/o Cont (02.06.20 @ 16:37) >   1. Left ventricular ejection fraction, by visual estimation, is 40 to 45%.   2. Mildly decreased global left ventricular systolic function.   3. Mid and apical anterior wall is abnormal as described above.   4. Global cardiomyopathy.   5. Spectral Doppler shows impaired relaxation pattern of left ventricular myocardial filling (Grade I diastolic dysfunction).   6. There is mild concentric left ventricular hypertrophy.   7. Normal right ventricular size and function.   8. There is no evidence of pericardial effusion.   9. Mild thickening and calcification of the anterior and posterior mitral valve leaflets.  10. Trace mitral valve regurgitation.    < end of copied text >    [x ] Stress Test:  < from: NM Pharm Stress Test, Dual Isotope (02.10.20 @ 14:46) >  1. THere is scintigraphic evidence for an extensive RCA territory myocardial infarct, no noted ischemia.    2. There is abnormal left ventricular contractility, diminished calculated ejection fraction and wall motion abnormalities, as described above. Overall post stress ejection fraction was 28%     < end of copied text >    LABS:	 	    CARDIAC MARKERS:  Troponin I, Serum: .046 ng/mL (02-08 @ 08:01)  Troponin I, Serum: .074 ng/mL (02-08 @ 00:58)    08 Feb 2020 08:01    140    |  107    |  23     ----------------------------<  133    4.2     |  25     |  1.52 Patient is a 64y old  Male who presents with a chief complaint of Loss of vision in left eye (10 Feb 2020 16:51)    PAST MEDICAL & SURGICAL HISTORY:  Gout  Hypertension  No significant past surgical history    INTERVAL HISTORY: Resting in bed, not in any acute distress, still with Lt eye impaired vision    	  MEDICATIONS:  MEDICATIONS  (STANDING):  amLODIPine   Tablet 10 milliGRAM(s) Oral daily  aspirin enteric coated 81 milliGRAM(s) Oral daily  atorvastatin 40 milliGRAM(s) Oral at bedtime  carvedilol 25 milliGRAM(s) Oral every 12 hours  heparin  Injectable 5000 Unit(s) SubCutaneous three times a day  hydrALAZINE 25 milliGRAM(s) Oral three times a day  influenza   Vaccine 0.5 milliLiter(s) IntraMuscular once  isosorbide   mononitrate ER Tablet (IMDUR) 30 milliGRAM(s) Oral daily    Vitals:  T(F): 97.1 (02-10-20 @ 16:49), Max: 97.4 (02-10-20 @ 04:49)  HR: 86 (02-10-20 @ 16:49) (74 - 86)  BP: 157/97 (02-10-20 @ 16:49) (117/67 - 157/97)  RR: 17 (02-10-20 @ 16:49) (16 - 18)  SpO2: 97% (02-10-20 @ 16:49) (97% - 98%)  Wt(kg): -- 110. 2kg    02-09 @ 07:01  -  02-10 @ 07:00  --------------------------------------------------------  IN:    Oral Fluid: 631 mL  Total IN: 631 mL    OUT:  Total OUT: 0 mL    Total NET: 631 mL    PHYSICAL EXAM:  Neuro: Awake, responsive  CV: S1 S2 RRR  Lungs: CTABL  GI: Soft, BS +, ND, NT  Extremities: No edema    TELEMETRY: RSR    RADIOLOGY: < from: US Duplex Carotid Arteries Complete, Bilateral (02.08.20 @ 09:16) >    Extensive bilateral intimal thickening and diffuse soft plaque..    No elevated velocities to suggest hemodynamically significant stenosis.    < end of copied text >  < from: CT Head No Cont (02.05.20 @ 17:51) >  1)  unremarkable CT study of the brain  2)  scattered mild mucosal thickening in the sinuses without fluid levels. Clear mastoids.    < end of copied text >  < from: MR Head No Cont (02.06.20 @ 09:57) >  1)  scattered chronic ischemic changes in both hemispheres as well as within the posterior fossa. No diffusion restriction or MR evidence of acute ischemia..  2)  no space-occupying lesion noted    < end of copied text >    DIAGNOSTIC TESTING:    [x ] Echocardiogram: < from: TTE Echo Doppler w/o Cont (02.06.20 @ 16:37) >   1. Left ventricular ejection fraction, by visual estimation, is 40 to 45%.   2. Mildly decreased global left ventricular systolic function.   3. Mid and apical anterior wall is abnormal as described above.   4. Global cardiomyopathy.   5. Spectral Doppler shows impaired relaxation pattern of left ventricular myocardial filling (Grade I diastolic dysfunction).   6. There is mild concentric left ventricular hypertrophy.   7. Normal right ventricular size and function.   8. There is no evidence of pericardial effusion.   9. Mild thickening and calcification of the anterior and posterior mitral valve leaflets.  10. Trace mitral valve regurgitation.    < end of copied text >    [x ] Stress Test:  < from: NM Pharm Stress Test, Dual Isotope (02.10.20 @ 14:46) >  1. THere is scintigraphic evidence for an extensive RCA territory myocardial infarct, no noted ischemia.    2. There is abnormal left ventricular contractility, diminished calculated ejection fraction and wall motion abnormalities, as described above. Overall post stress ejection fraction was 28%     < end of copied text >    LABS:	 	    CARDIAC MARKERS:  Troponin I, Serum: .046 ng/mL (02-08 @ 08:01)  Troponin I, Serum: .074 ng/mL (02-08 @ 00:58)    08 Feb 2020 08:01    140    |  107    |  23     ----------------------------<  133    4.2     |  25     |  1.52

## 2020-02-10 NOTE — CONSULT NOTE ADULT - SUBJECTIVE AND OBJECTIVE BOX
[facility] NEPHROLOGY SERVICES, [facility]  NEPHROLOGY AND HYPERTENSION  [address]  [address]  [place] [address]  [phone]    MD GIANNA PILLAI MD ANDREY GONCHARUK, MD MADHU KORRAPATI, MD YELENA ROSENBERG, MD BINNY KOSHY, MD CHRISTOPHER CAPUTO, MD EDWARD BOVER, MD      Information from chart:  "Patient is a 64y old  Male who presents with a chief complaint of Loss of vision in left eye (10 Feb 2020 12:56)    HPI:  65 YO M with a sig PMHx of HTN presents to ED with loss of vision in left eye x 2 months. Patient states he went to see and optometrist today, and was sent to ED because his blood pressure was too high. Patient denies any other complaints. Denies dizziness, loss of consciousness, weakness, abnormal gait, chest pain, shortness of breath or palpitations. (2020 19:44)   "    Patient feels well no distress;   Admitted with a Cr 1.5;   Comprehensive Metabolic Panel (17 @ 13:44)    Creatinine, Serum: 1.46 mg/dL        PAST MEDICAL & SURGICAL HISTORY:  Gout  Hypertension  No significant past surgical history    FAMILY HISTORY:    Allergies    No Known Allergies    Intolerances      Home Medications:    MEDICATIONS  (STANDING):  amLODIPine   Tablet 10 milliGRAM(s) Oral daily  aspirin enteric coated 81 milliGRAM(s) Oral daily  atorvastatin 40 milliGRAM(s) Oral at bedtime  carvedilol 25 milliGRAM(s) Oral every 12 hours  heparin  Injectable 5000 Unit(s) SubCutaneous three times a day  hydrALAZINE 25 milliGRAM(s) Oral three times a day  influenza   Vaccine 0.5 milliLiter(s) IntraMuscular once  isosorbide   mononitrate ER Tablet (IMDUR) 30 milliGRAM(s) Oral daily    MEDICATIONS  (PRN):    Vital Signs Last 24 Hrs  T(C): 36.2 (10 Feb 2020 16:49), Max: 36.3 (10 Feb 2020 04:49)  T(F): 97.1 (10 Feb 2020 16:49), Max: 97.4 (10 Feb 2020 04:49)  HR: 86 (10 Feb 2020 16:49) (74 - 86)  BP: 157/97 (10 Feb 2020 16:49) (117/67 - 157/97)  BP(mean): --  RR: 17 (10 Feb 2020 16:49) (16 - 18)  SpO2: 97% (10 Feb 2020 16:49) (97% - 98%)    Daily     Daily Weight in k.2 (10 Feb 2020 04:49)    20 @ 07:01  -  02-10-20 @ 07:00  --------------------------------------------------------  IN: 631 mL / OUT: 0 mL / NET: 631 mL      CAPILLARY BLOOD GLUCOSE        PHYSICAL EXAM:      T(C): 36.2 (02-10-20 @ 16:49), Max: 36.3 (02-10-20 @ 04:49)  HR: 86 (02-10-20 @ 16:49) (74 - 86)  BP: 157/97 (02-10-20 @ 16:49) (117/ - 157/97)  RR: 17 (02-10-20 @ 16:49) (16 - 18)  SpO2: 97% (02-10-20 @ 16:49) (97% - 98%)  Wt(kg): --  Respiratory: clear anteriorly, decreased BS at bases  Cardiovascular: S1 S2  Gastrointestinal: soft NT ND +BS  Extremities:   1 edema                      Creatinine Trend: 1.52<--, 1.51<--, 1.55<--          Assessment   CKD 3; suspected HTN nephrosclerosis; underlying renovascular disease  Await stress test;       Plan  Slow adjustments in BP medications;   Will follow trend;       Nathan Lind MD

## 2020-02-10 NOTE — PROGRESS NOTE ADULT - SUBJECTIVE AND OBJECTIVE BOX
Patient is a 64y old  Male who presents with a chief complaint of Loss of vision in left eye       Subjective: Pt seen and examined.       REVIEW OF SYSTEMS:    CONSTITUTIONAL: No fever, weight loss, or fatigue  EYES: No eye pain, + visual disturbances, No discharge  RESPIRATORY: No cough, wheezing, chills or hemoptysis; No shortness of breath  CARDIOVASCULAR: No chest pain, palpitations, dizziness, or leg swelling  GASTROINTESTINAL: No abdominal or epigastric pain. No nausea, vomiting, or hematemesis; No diarrhea or constipation. No melena or hematochezia.  GENITOURINARY: No dysuria, frequency, hematuria, or incontinence  NEUROLOGICAL: No headaches, memory loss, loss of strength, numbness, or tremors  SKIN: No itching, burning, rashes, or lesions       MEDICATIONS  (STANDING):  amLODIPine   Tablet 10 milliGRAM(s) Oral daily  aspirin enteric coated 81 milliGRAM(s) Oral daily  atorvastatin 40 milliGRAM(s) Oral at bedtime  carvedilol 25 milliGRAM(s) Oral every 12 hours  heparin  Injectable 5000 Unit(s) SubCutaneous three times a day  hydrALAZINE 25 milliGRAM(s) Oral three times a day  influenza   Vaccine 0.5 milliLiter(s) IntraMuscular once  isosorbide   mononitrate ER Tablet (IMDUR) 30 milliGRAM(s) Oral daily    MEDICATIONS  (PRN):      Vital Signs Last 24 Hrs  T(C): 36.3 (10 Feb 2020 04:49), Max: 36.3 (2020 16:46)  T(F): 97.4 (10 Feb 2020 04:49), Max: 97.4 (10 Feb 2020 04:49)  HR: 74 (10 Feb 2020 04:49) (74 - 79)  BP: 117/70 (10 Feb 2020 04:49) (117/67 - 128/71)  BP(mean): --  RR: 16 (10 Feb 2020 04:49) (16 - 18)  SpO2: 98% (10 Feb 2020 04:49) (97% - 98%)      PHYSICAL EXAM:    GENERAL: NAD  NERVOUS SYSTEM:  Alert & Oriented X3, 5/5 motor strength, L eye visual impairment  CHEST/LUNG: Clear to auscultation bilaterally  HEART: S1 S2+, Regular rate and rhythm  ABDOMEN: Soft, Nontender, Nondistended; Bowel sounds present  EXTREMITIES: No clubbing, cyanosis, or edema      Daily     Daily Weight in k.2 (10 Feb 2020 04:49)  I&O's Summary    2020 07:01  -  10 Feb 2020 07:00  --------------------------------------------------------  IN: 631 mL / OUT: 0 mL / NET: 631 mL          CAPILLARY BLOOD GLUCOSE            RADIOLOGY & ADDITIONAL STUDIES:

## 2020-02-11 LAB
ANION GAP SERPL CALC-SCNC: 7 MMOL/L — SIGNIFICANT CHANGE UP (ref 5–17)
BUN SERPL-MCNC: 29 MG/DL — HIGH (ref 7–23)
CALCIUM SERPL-MCNC: 8.9 MG/DL — SIGNIFICANT CHANGE UP (ref 8.5–10.1)
CHLORIDE SERPL-SCNC: 111 MMOL/L — HIGH (ref 96–108)
CO2 SERPL-SCNC: 27 MMOL/L — SIGNIFICANT CHANGE UP (ref 22–31)
CREAT SERPL-MCNC: 1.62 MG/DL — HIGH (ref 0.5–1.3)
GLUCOSE SERPL-MCNC: 138 MG/DL — HIGH (ref 70–99)
HCT VFR BLD CALC: 39.5 % — SIGNIFICANT CHANGE UP (ref 39–50)
HGB BLD-MCNC: 12.7 G/DL — LOW (ref 13–17)
MCHC RBC-ENTMCNC: 27.1 PG — SIGNIFICANT CHANGE UP (ref 27–34)
MCHC RBC-ENTMCNC: 32.2 GM/DL — SIGNIFICANT CHANGE UP (ref 32–36)
MCV RBC AUTO: 84.2 FL — SIGNIFICANT CHANGE UP (ref 80–100)
NRBC # BLD: 0 /100 WBCS — SIGNIFICANT CHANGE UP (ref 0–0)
PLATELET # BLD AUTO: 305 K/UL — SIGNIFICANT CHANGE UP (ref 150–400)
POTASSIUM SERPL-MCNC: 4.5 MMOL/L — SIGNIFICANT CHANGE UP (ref 3.5–5.3)
POTASSIUM SERPL-SCNC: 4.5 MMOL/L — SIGNIFICANT CHANGE UP (ref 3.5–5.3)
RBC # BLD: 4.69 M/UL — SIGNIFICANT CHANGE UP (ref 4.2–5.8)
RBC # FLD: 14.4 % — SIGNIFICANT CHANGE UP (ref 10.3–14.5)
SODIUM SERPL-SCNC: 145 MMOL/L — SIGNIFICANT CHANGE UP (ref 135–145)
WBC # BLD: 6.01 K/UL — SIGNIFICANT CHANGE UP (ref 3.8–10.5)
WBC # FLD AUTO: 6.01 K/UL — SIGNIFICANT CHANGE UP (ref 3.8–10.5)

## 2020-02-11 PROCEDURE — 99233 SBSQ HOSP IP/OBS HIGH 50: CPT

## 2020-02-11 RX ADMIN — HEPARIN SODIUM 5000 UNIT(S): 5000 INJECTION INTRAVENOUS; SUBCUTANEOUS at 21:07

## 2020-02-11 RX ADMIN — HEPARIN SODIUM 5000 UNIT(S): 5000 INJECTION INTRAVENOUS; SUBCUTANEOUS at 05:43

## 2020-02-11 RX ADMIN — Medication 81 MILLIGRAM(S): at 11:54

## 2020-02-11 RX ADMIN — ATORVASTATIN CALCIUM 40 MILLIGRAM(S): 80 TABLET, FILM COATED ORAL at 21:07

## 2020-02-11 RX ADMIN — Medication 25 MILLIGRAM(S): at 13:16

## 2020-02-11 RX ADMIN — Medication 25 MILLIGRAM(S): at 05:43

## 2020-02-11 RX ADMIN — AMLODIPINE BESYLATE 10 MILLIGRAM(S): 2.5 TABLET ORAL at 05:43

## 2020-02-11 RX ADMIN — ISOSORBIDE MONONITRATE 30 MILLIGRAM(S): 60 TABLET, EXTENDED RELEASE ORAL at 11:54

## 2020-02-11 RX ADMIN — Medication 25 MILLIGRAM(S): at 21:07

## 2020-02-11 RX ADMIN — CARVEDILOL PHOSPHATE 25 MILLIGRAM(S): 80 CAPSULE, EXTENDED RELEASE ORAL at 17:20

## 2020-02-11 RX ADMIN — CARVEDILOL PHOSPHATE 25 MILLIGRAM(S): 80 CAPSULE, EXTENDED RELEASE ORAL at 05:43

## 2020-02-11 RX ADMIN — HEPARIN SODIUM 5000 UNIT(S): 5000 INJECTION INTRAVENOUS; SUBCUTANEOUS at 13:16

## 2020-02-11 NOTE — PROGRESS NOTE ADULT - SUBJECTIVE AND OBJECTIVE BOX
[facility] NEPHROLOGY SERVICES, [facility]  NEPHROLOGY AND HYPERTENSION  [address]  [address]  [place] [address]  [phone]    MD GIANNA PILLAI, MD SURJIT HOLGUIN, MD VINNY AUGUSTE, MD RADHA VINES, MD RIZWANA LANTIGUA MD          Patient feels well no complaints today.    MEDICATIONS  (STANDING):  amLODIPine   Tablet 10 milliGRAM(s) Oral daily  aspirin enteric coated 81 milliGRAM(s) Oral daily  atorvastatin 40 milliGRAM(s) Oral at bedtime  carvedilol 25 milliGRAM(s) Oral every 12 hours  heparin  Injectable 5000 Unit(s) SubCutaneous three times a day  hydrALAZINE 25 milliGRAM(s) Oral three times a day  influenza   Vaccine 0.5 milliLiter(s) IntraMuscular once  isosorbide   mononitrate ER Tablet (IMDUR) 30 milliGRAM(s) Oral daily    MEDICATIONS  (PRN):      02-11-20 @ 07:01  -  02-11-20 @ 19:55  --------------------------------------------------------  IN: 590 mL / OUT: 0 mL / NET: 590 mL      PHYSICAL EXAM:      T(C): 36.6 (02-11-20 @ 15:27), Max: 36.6 (02-11-20 @ 15:27)  HR: 74 (02-11-20 @ 15:27) (74 - 82)  BP: 129/81 (02-11-20 @ 15:27) (129/81 - 148/88)  RR: 17 (02-11-20 @ 15:27) (17 - 17)  SpO2: 98% (02-11-20 @ 15:27) (96% - 98%)  Wt(kg): --  Respiratory: clear anteriorly, decreased BS at bases  Cardiovascular: S1 S2  Gastrointestinal: soft NT ND +BS  Extremities:   1 edema                                    12.7   6.01  )-----------( 305      ( 11 Feb 2020 07:15 )             39.5     02-11    145  |  111<H>  |  29<H>  ----------------------------<  138<H>  4.5   |  27  |  1.62<H>    Ca    8.9      11 Feb 2020 07:15          Creatinine Trend: 1.62<--, 1.52<--, 1.51<--, 1.55<--      Assessment   CKD 3; suspected HTN nephrosclerosis; underlying renovascular disease        Plan  Slow adjustments in BP medications;   Will follow trend;   Stable from renal view.      Nathan Lind MD

## 2020-02-11 NOTE — PROGRESS NOTE ADULT - ASSESSMENT
63 YO M with a sig PMHx of HTN presents to ED with loss of vision in left eye x 2 months, Pt was seen by an ophthalmologist as outpt and was referred to a Retina specialist but he was sent to the ER from there for high BP.    #Left eye vision Impairment  c/w Aspirin, lipitor  MRI showed chronic ischemic changes, No acute ischemia  carotid doppler- Ext b/l intimal thickening and diffuse soft plaque, No elevated velocities to suggest hemodynamically significant stenosis.  Neurology on board.    Troponin elevated  0.146 -> 0.129  could be sec to stress cardiomyopathy vs demand  trend trop  Echo- EF 40- 45%, Mildly dec global L ventricular systolic function  c/w ASA, coreg, lipitor  Stress test- stress test: EF 28%,  There is scintigraphic evidence for an extensive RCA territory myocardial infarct, no noted ischemia.  cardiology on board.     HTN  c/w hydralazine to 25mg Q8H, amlodipine, coreg     CANDICE  unknown baseline  cr around 1.5  f/u bmp  Renal US- Normal    Preventive measure  Heparin SQ

## 2020-02-11 NOTE — PROGRESS NOTE ADULT - ASSESSMENT
Subjective Complaints:  Historian:             Vital Signs Last 24 Hrs  T(C): 36.6 (11 Feb 2020 15:27), Max: 36.6 (11 Feb 2020 15:27)  T(F): 97.8 (11 Feb 2020 15:27), Max: 97.8 (11 Feb 2020 15:27)  HR: 74 (11 Feb 2020 15:27) (74 - 82)  BP: 129/81 (11 Feb 2020 15:27) (129/81 - 148/88)  BP(mean): --  RR: 17 (11 Feb 2020 15:27) (17 - 17)  SpO2: 98% (11 Feb 2020 15:27) (96% - 98%)    GENERAL PHYSICAL EXAM:  General:  Appears stated age, well-groomed, well-nourished, no distress  HEENT:  NC/AT, patent nares w/ pink mucosa, OP clear w/o lesions, PERRL, EOMI, conjunctivae clear, no thyromegaly, nodules, adenopathy, no JVD  Chest:  Full & symmetric excursion, no increased effort, breath sounds clear  Cardiovascular:  Regular rhythm, S1, S2, no murmur/rub/S3/S4, no carotid/femoral/abdominal bruit, radial/pedal pulses 2+, no edema  Abdomen:  Soft, non-tender, non-distended, normoactive bowel sounds, no HSM  Extremities:  Gait & station:   Digits:   Nails:   Joints, Bones, Muscles:   ROM:   Stability:  Skin:  No rash/erythema/ecchymoses/petechiae/wounds/abscess/warm/dry  Musculoskeletal:  Full ROM in all joints w/o swelling/tenderness/effusion        LABS:                        12.7   6.01  )-----------( 305      ( 11 Feb 2020 07:15 )             39.5     02-11    145  |  111<H>  |  29<H>  ----------------------------<  138<H>  4.5   |  27  |  1.62<H>    Ca    8.9      11 Feb 2020 07:15            RADIOLOGY & ADDITIONAL STUDIES:        Neurology Progress Note:      Mental Status: awake alert  speech fluent       Cranial Nerves: decreased vision left eys for 2 months       Motor:    arm lkeg 4/5         Sensory: intact      Cerebellar: defrd      Gait: no ataxia       Assesment/Plan:  htn less vision left eye on asa mri brain  noted  can be discharge on asa 81 mg will follow in office opthalomology eval  out side

## 2020-02-11 NOTE — PROGRESS NOTE ADULT - SUBJECTIVE AND OBJECTIVE BOX
Patient is a 64y old  Male who presents with a chief complaint of Loss of vision in left eye       Subjective: Pt seen and examined.      REVIEW OF SYSTEMS:    CONSTITUTIONAL: No fever, weight loss, or fatigue  EYES: No eye pain, + L eye visual disturbances  RESPIRATORY: No cough, wheezing, chills or hemoptysis; No shortness of breath  CARDIOVASCULAR: No chest pain, palpitations, dizziness, or leg swelling  GASTROINTESTINAL: No abdominal or epigastric pain. No nausea, vomiting, or hematemesis; No diarrhea or constipation. No melena or hematochezia.  GENITOURINARY: No dysuria, frequency, hematuria, or incontinence  NEUROLOGICAL: No headaches, memory loss, loss of strength, numbness, or tremors  SKIN: No itching, burning, rashes, or lesions       MEDICATIONS  (STANDING):  amLODIPine   Tablet 10 milliGRAM(s) Oral daily  aspirin enteric coated 81 milliGRAM(s) Oral daily  atorvastatin 40 milliGRAM(s) Oral at bedtime  carvedilol 25 milliGRAM(s) Oral every 12 hours  heparin  Injectable 5000 Unit(s) SubCutaneous three times a day  hydrALAZINE 25 milliGRAM(s) Oral three times a day  influenza   Vaccine 0.5 milliLiter(s) IntraMuscular once  isosorbide   mononitrate ER Tablet (IMDUR) 30 milliGRAM(s) Oral daily    MEDICATIONS  (PRN):      Vital Signs Last 24 Hrs  T(C): 36.5 (2020 10:57), Max: 36.5 (2020 10:57)  T(F): 97.7 (2020 10:57), Max: 97.7 (2020 10:57)  HR: 81 (2020 10:57) (75 - 86)  BP: 144/87 (2020 10:57) (130/77 - 157/97)  BP(mean): --  RR: 17 (2020 10:57) (17 - 17)  SpO2: 98% (2020 10:57) (96% - 98%)      PHYSICAL EXAM:    GENERAL: NAD  NERVOUS SYSTEM:  Alert & Oriented X3, L eye visual impairment  CHEST/LUNG: Clear to auscultation bilaterally  HEART: S1 S2+, Regular rate and rhythm  ABDOMEN: Soft, Nontender, Nondistended; Bowel sounds present  EXTREMITIES: No clubbing, cyanosis, or edema      Daily     Daily Weight in k.7 (2020 05:20)  I&O's Summary    2020 07:01  -  2020 14:26  --------------------------------------------------------  IN: 590 mL / OUT: 0 mL / NET: 590 mL                            12.7   6.01  )-----------( 305      ( 2020 07:15 )             39.5   02-11    145  |  111<H>  |  29<H>  ----------------------------<  138<H>  4.5   |  27  |  1.62<H>    Ca    8.9      2020 07:15    CAPILLARY BLOOD GLUCOSE            RADIOLOGY & ADDITIONAL STUDIES:

## 2020-02-12 VITALS
TEMPERATURE: 97 F | DIASTOLIC BLOOD PRESSURE: 81 MMHG | SYSTOLIC BLOOD PRESSURE: 138 MMHG | RESPIRATION RATE: 18 BRPM | HEART RATE: 76 BPM | OXYGEN SATURATION: 99 %

## 2020-02-12 LAB
ANION GAP SERPL CALC-SCNC: 6 MMOL/L — SIGNIFICANT CHANGE UP (ref 5–17)
BUN SERPL-MCNC: 23 MG/DL — SIGNIFICANT CHANGE UP (ref 7–23)
CALCIUM SERPL-MCNC: 9.1 MG/DL — SIGNIFICANT CHANGE UP (ref 8.5–10.1)
CHLORIDE SERPL-SCNC: 109 MMOL/L — HIGH (ref 96–108)
CO2 SERPL-SCNC: 27 MMOL/L — SIGNIFICANT CHANGE UP (ref 22–31)
CREAT SERPL-MCNC: 1.58 MG/DL — HIGH (ref 0.5–1.3)
GLUCOSE SERPL-MCNC: 136 MG/DL — HIGH (ref 70–99)
HCT VFR BLD CALC: 41.3 % — SIGNIFICANT CHANGE UP (ref 39–50)
HGB BLD-MCNC: 13.4 G/DL — SIGNIFICANT CHANGE UP (ref 13–17)
MCHC RBC-ENTMCNC: 27.6 PG — SIGNIFICANT CHANGE UP (ref 27–34)
MCHC RBC-ENTMCNC: 32.4 GM/DL — SIGNIFICANT CHANGE UP (ref 32–36)
MCV RBC AUTO: 85.2 FL — SIGNIFICANT CHANGE UP (ref 80–100)
NRBC # BLD: 0 /100 WBCS — SIGNIFICANT CHANGE UP (ref 0–0)
PLATELET # BLD AUTO: 309 K/UL — SIGNIFICANT CHANGE UP (ref 150–400)
POTASSIUM SERPL-MCNC: 4.4 MMOL/L — SIGNIFICANT CHANGE UP (ref 3.5–5.3)
POTASSIUM SERPL-SCNC: 4.4 MMOL/L — SIGNIFICANT CHANGE UP (ref 3.5–5.3)
RBC # BLD: 4.85 M/UL — SIGNIFICANT CHANGE UP (ref 4.2–5.8)
RBC # FLD: 14.4 % — SIGNIFICANT CHANGE UP (ref 10.3–14.5)
SODIUM SERPL-SCNC: 142 MMOL/L — SIGNIFICANT CHANGE UP (ref 135–145)
WBC # BLD: 7.13 K/UL — SIGNIFICANT CHANGE UP (ref 3.8–10.5)
WBC # FLD AUTO: 7.13 K/UL — SIGNIFICANT CHANGE UP (ref 3.8–10.5)

## 2020-02-12 PROCEDURE — 99239 HOSP IP/OBS DSCHRG MGMT >30: CPT

## 2020-02-12 RX ORDER — HYDRALAZINE HCL 50 MG
50 TABLET ORAL EVERY 8 HOURS
Refills: 0 | Status: DISCONTINUED | OUTPATIENT
Start: 2020-02-12 | End: 2020-02-12

## 2020-02-12 RX ORDER — CARVEDILOL PHOSPHATE 80 MG/1
1 CAPSULE, EXTENDED RELEASE ORAL
Qty: 60 | Refills: 0
Start: 2020-02-12 | End: 2020-03-12

## 2020-02-12 RX ORDER — HYDRALAZINE HCL 50 MG
1 TABLET ORAL
Qty: 90 | Refills: 0
Start: 2020-02-12 | End: 2020-03-12

## 2020-02-12 RX ORDER — ATORVASTATIN CALCIUM 80 MG/1
1 TABLET, FILM COATED ORAL
Qty: 30 | Refills: 0
Start: 2020-02-12 | End: 2020-03-12

## 2020-02-12 RX ORDER — ISOSORBIDE MONONITRATE 60 MG/1
1 TABLET, EXTENDED RELEASE ORAL
Qty: 30 | Refills: 0
Start: 2020-02-12 | End: 2020-03-12

## 2020-02-12 RX ORDER — ASPIRIN/CALCIUM CARB/MAGNESIUM 324 MG
1 TABLET ORAL
Qty: 30 | Refills: 0
Start: 2020-02-12 | End: 2020-03-12

## 2020-02-12 RX ADMIN — Medication 81 MILLIGRAM(S): at 11:12

## 2020-02-12 RX ADMIN — AMLODIPINE BESYLATE 10 MILLIGRAM(S): 2.5 TABLET ORAL at 06:09

## 2020-02-12 RX ADMIN — CARVEDILOL PHOSPHATE 25 MILLIGRAM(S): 80 CAPSULE, EXTENDED RELEASE ORAL at 06:10

## 2020-02-12 RX ADMIN — ISOSORBIDE MONONITRATE 30 MILLIGRAM(S): 60 TABLET, EXTENDED RELEASE ORAL at 11:12

## 2020-02-12 RX ADMIN — HEPARIN SODIUM 5000 UNIT(S): 5000 INJECTION INTRAVENOUS; SUBCUTANEOUS at 06:10

## 2020-02-12 RX ADMIN — CARVEDILOL PHOSPHATE 25 MILLIGRAM(S): 80 CAPSULE, EXTENDED RELEASE ORAL at 17:24

## 2020-02-12 RX ADMIN — Medication 25 MILLIGRAM(S): at 06:10

## 2020-02-12 NOTE — DIETITIAN INITIAL EVALUATION ADULT. - OTHER INFO
Pt reports no N/V/C/D chewing or swallowing difficulty. Pt has full upper and partial lower dentures. Pt reports -235# and 100% PO intake of meals. Pt lives with wife and grandchildren. Wife does all the shopping and they cook together. Pt eats cultural Andrea diet ie plantains, eggs, chicken, rice. Pt educated on DASH diet and weight loss with [facility] Healthy Nutrition Therapy handout provided. Pt reports no N/V/C/D chewing or swallowing difficulty. Pt has full upper and partial lower dentures. Pt reports -235# and 100% PO intake of meals. Pt lives with wife and grandchildren. Wife does all the shopping and they cook together. Pt eats cultural Andrea diet ie plantains, eggs, chicken, rice. Pt educated on DASH diet and weight loss, and consistent carbohydrate intake with [facility] Healthy Nutrition Therapy, Weight Loss Tips and Consistent Carbohydrate Nutrition Therapy handouts provided. Pt reports no N/V/C/D chewing or swallowing difficulty. Pt has full upper and partial lower dentures. Pt reports -235# and 100% PO intake of meals. Pt lives with wife and grandchildren. Wife does all the shopping and they cook together. Pt eats cultural Andrea diet ie plantains, eggs, chicken, rice. Pt educated on DASH diet and weight loss, and encouraged carbohydrate controlled diet for A1c=6.4%. Provided with [facility] Healthy Nutrition Therapy, Weight Loss Tips, Meal Planning with the Plate Method handouts.

## 2020-02-12 NOTE — DIETITIAN INITIAL EVALUATION ADULT. - DIET TYPE
Diet, DASH/TLC - Sodium & Cholesterol Restricted DASH/TLC (sodium and cholesterol restricted diet)/consistent carbohydrate (no snacks)

## 2020-02-12 NOTE — DIETITIAN INITIAL EVALUATION ADULT. - PERTINENT LABORATORY DATA
02-12 Na 142 mmol/L Glu 136 mg/dL<H> K+ 4.4 mmol/L Cr 1.58 mg/dL<H> BUN 23 mg/dL Phos n/a   Alb 3.1<L>  PAB n/a   Hgb 13.4 g/dL Hct 41.3 % HgA1C n/a    Glucose, Serum: 136 mg/dL <H>   24Hr FS: 02-12 Na 142 mmol/L Glu 136 mg/dL<H> K+ 4.4 mmol/L Cr 1.58 mg/dL<H> BUN 23 mg/dL Phos n/a   Alb 3.1<L>  PAB n/a   Hgb 13.4 g/dL Hct 41.3 % HgA1C 6.4%    Glucose, Serum: 136 mg/dL <H>   24Hr FS:

## 2020-02-12 NOTE — DIETITIAN INITIAL EVALUATION ADULT. - ENERGY NEEDS
Weight (kg): 106.6 (02-06)  IBW:  78kg +/- 10%        % IBW:     139%        UBW:    106.5kg          %UBW: 102%

## 2020-02-12 NOTE — DISCHARGE NOTE PROVIDER - CARE PROVIDER_API CALL
Esau Barahona)  Cardiology; Cardiovascular Disease; Nuclear Cardiology  [address] [place] [address]  Phone: [phone]  Fax: [phone]  Follow Up Time: 2 weeks    Carol Randle  [address] [facility]  Phone: (   )    -  Fax: (   )    -  Established Patient  Follow Up Time: 1 week    Zonia Edwards)  Internal Medicine  [address], [facility] [place] [address]  Phone: [phone]  Fax: [phone]  Follow Up Time: 2 weeks

## 2020-02-12 NOTE — DISCHARGE NOTE PROVIDER - NSDCCPCAREPLAN_GEN_ALL_CORE_FT
PRINCIPAL DISCHARGE DIAGNOSIS  Diagnosis: Blurry vision, left eye  Assessment and Plan of Treatment: MRI showed no acute changes, follow up with ophthalmologist.      SECONDARY DISCHARGE DIAGNOSES  Diagnosis: Hypertension, unspecified type  Assessment and Plan of Treatment: c/w hydralazine, norvasc

## 2020-02-12 NOTE — DISCHARGE NOTE PROVIDER - PROVIDER TOKENS
PROVIDER:[TOKEN:[[id number]],FOLLOWUP:[2 weeks]],FREE:[LAST:[Randle],FIRST:[Janki],PHONE:[(   )    -],FAX:[(   )    -],ADDRESS:[[address] [place] [address]],FOLLOWUP:[1 week],ESTABLISHEDPATIENT:[T]],PROVIDER:[TOKEN:[[id number]],FOLLOWUP:[2 weeks]]

## 2020-02-12 NOTE — DISCHARGE NOTE PROVIDER - CARE PROVIDERS DIRECT ADDRESSES
,[email or web address],DirectAddress_Unknown,DirectAddress_Unknown

## 2020-02-12 NOTE — DISCHARGE NOTE PROVIDER - HOSPITAL COURSE
63 YO M with a sig PMHx of HTN presents to ED with loss of vision in left eye x 2 months, Pt was seen by an ophthalmologist as outpt and was referred to a Retina specialist but he was sent to the ER from there for high BP. Pt had MRI that showed chronic ischemic changes, No acute ischemia    carotid doppler- Ext b/l intimal thickening and diffuse soft plaque, No elevated velocities to suggest hemodynamically significant stenosis. continue to take BP medications and follow up with ophthalmologist and neurologist as out patient.

## 2020-02-12 NOTE — DIETITIAN INITIAL EVALUATION ADULT. - PERTINENT MEDS FT
amLODIPine   Tablet  aspirin enteric coated  atorvastatin  carvedilol  heparin  Injectable  hydrALAZINE  influenza   Vaccine  isosorbide   mononitrate ER Tablet (IMDUR)

## 2020-02-12 NOTE — DISCHARGE NOTE PROVIDER - NSDCMRMEDTOKEN_GEN_ALL_CORE_FT
aspirin 81 mg oral delayed release tablet: 1 tab(s) orally once a day  atorvastatin 40 mg oral tablet: 1 tab(s) orally once a day (at bedtime)  carvedilol 25 mg oral tablet: 1 tab(s) orally every 12 hours  hydrALAZINE 50 mg oral tablet: 1 tab(s) orally every 8 hours  isosorbide mononitrate 30 mg oral tablet, extended release: 1 tab(s) orally once a day  Norvasc 10 mg oral tablet: 1 tab(s) orally once a day

## 2020-02-12 NOTE — PROGRESS NOTE ADULT - SUBJECTIVE AND OBJECTIVE BOX
[facility] NEPHROLOGY SERVICES, [facility]  NEPHROLOGY AND HYPERTENSION  [address]  [address]  [place] [address]  [phone]    MD GIANNA PILLAI, MD RIOS HUERTA, MD SURJIT SANCHEZ, MD VINNY AUGUSTE, MD RADHA VINES, MD RIZWANA LANTIGUA MD          Patient feels well no complaints today.    MEDICATIONS  (STANDING):  amLODIPine   Tablet 10 milliGRAM(s) Oral daily  aspirin enteric coated 81 milliGRAM(s) Oral daily  atorvastatin 40 milliGRAM(s) Oral at bedtime  carvedilol 25 milliGRAM(s) Oral every 12 hours  heparin  Injectable 5000 Unit(s) SubCutaneous three times a day  hydrALAZINE 25 milliGRAM(s) Oral three times a day  influenza   Vaccine 0.5 milliLiter(s) IntraMuscular once  isosorbide   mononitrate ER Tablet (IMDUR) 30 milliGRAM(s) Oral daily    MEDICATIONS  (PRN):      02-11-20 @ 07:01  -  02-12-20 @ 07:00  --------------------------------------------------------  IN: 590 mL / OUT: 0 mL / NET: 590 mL    02-12-20 @ 07:01  -  02-12-20 @ 16:41  --------------------------------------------------------  IN: 885 mL / OUT: 0 mL / NET: 885 mL      PHYSICAL EXAM:      T(C): 36.4 (02-12-20 @ 10:39), Max: 36.4 (02-12-20 @ 10:39)  HR: 75 (02-12-20 @ 10:39) (72 - 79)  BP: 149/86 (02-12-20 @ 10:39) (135/85 - 163/108)  RR: 18 (02-12-20 @ 10:39) (17 - 18)  SpO2: 94% (02-12-20 @ 10:39) (94% - 98%)  Wt(kg): --  Respiratory: clear anteriorly, decreased BS at bases  Cardiovascular: S1 S2  Gastrointestinal: soft NT ND +BS  Extremities:  1 edema                                    13.4   7.13  )-----------( 309      ( 12 Feb 2020 07:15 )             41.3     02-12    142  |  109<H>  |  23  ----------------------------<  136<H>  4.4   |  27  |  1.58<H>    Ca    9.1      12 Feb 2020 07:15          Creatinine Trend: 1.58<--, 1.62<--, 1.52<--, 1.51<--, 1.55<--    Assessment   CKD 3; suspected HTN nephrosclerosis; underlying renovascular disease        Plan  Slow adjustments in BP medications;   Will follow trend;   Stable from renal view.    Nathan Lind MD

## 2020-02-12 NOTE — DISCHARGE NOTE NURSING/CASE MANAGEMENT/SOCIAL WORK - PATIENT PORTAL LINK FT
You can access the FollowMyHealth Patient Portal offered by [facility] by registering at the following website: [email or web address]. By joining [facility]’s FollowMyHealth portal, you will also be able to view your health information using other applications (apps) compatible with our system.

## 2020-02-19 DIAGNOSIS — I16.0 HYPERTENSIVE URGENCY: ICD-10-CM

## 2020-02-19 DIAGNOSIS — Z91.14 PATIENT'S OTHER NONCOMPLIANCE WITH MEDICATION REGIMEN: ICD-10-CM

## 2020-02-19 DIAGNOSIS — M10.9 GOUT, UNSPECIFIED: ICD-10-CM

## 2020-02-19 DIAGNOSIS — H53.8 OTHER VISUAL DISTURBANCES: ICD-10-CM

## 2020-02-19 DIAGNOSIS — I12.9 HYPERTENSIVE CHRONIC KIDNEY DISEASE WITH STAGE 1 THROUGH STAGE 4 CHRONIC KIDNEY DISEASE, OR UNSPECIFIED CHRONIC KIDNEY DISEASE: ICD-10-CM

## 2020-02-19 DIAGNOSIS — I42.9 CARDIOMYOPATHY, UNSPECIFIED: ICD-10-CM

## 2020-02-19 DIAGNOSIS — N18.3 CHRONIC KIDNEY DISEASE, STAGE 3 (MODERATE): ICD-10-CM

## 2020-02-19 DIAGNOSIS — N17.9 ACUTE KIDNEY FAILURE, UNSPECIFIED: ICD-10-CM

## 2020-02-19 DIAGNOSIS — H54.7 UNSPECIFIED VISUAL LOSS: ICD-10-CM

## 2020-03-11 NOTE — PATIENT PROFILE ADULT - DO YOU FEEL UNSAFE AT HOME, WORK, OR SCHOOL?
no Complex Repair And Flap Additional Text (Will Appearing After The Standard Complex Repair Text): The complex repair was not sufficient to completely close the primary defect. The remaining additional defect was repaired with the flap mentioned below.

## 2021-03-13 NOTE — ED ADULT NURSE NOTE - NS ED NOTE  TALK SOMEONE YN
Patient Education     Wound Care  Taking proper care of your wound will help it heal. Your healthcare provider may show you how to clean and dress the wound. He or she will also explain how to tell if the wound is healing normally. If you are unsure of how to take care of the wound, be sure to clarify what dressing to use and how often you should change the bandages. Here are the basic steps.     A wound that's not healing normally may be dark in color or have white streaks.   Wash your hands  Tips for washing your hands include:  · Use liquid soap and lather for 2 minutes. Scrub between your fingers and under your nails.  · Rinse with warm water, keeping your fingers pointing down.  · Use a paper towel to dry your hands and to turn off the faucet.  Remove the used dressing  Here are suggestions for removing the dressing:  · If dressing changes cause you pain, be sure to take your pain medicine as prescribed by your healthcare provider 30 minutes before dressing changes.  · Set up your supplies.  · Put on disposable gloves if you’re dressing a wound for someone else or your wound is infected.  · Loosen the tape by pulling gently toward the wound.  · Gently take off the old dressing. If the dressing is stuck to the wound, moisten it with saline (if available) or clean water.  · If you have a drain or tube in the wound, be careful not to pull on it.  · Remove the dressing 1 layer at a time and put it in a plastic bag. Seal the bag and put it in the trash.  · Remove your gloves.  Inspect and dress the wound  Check the wound carefully:  · Each time you change the dressing, check the wound carefully to be sure it’s healing normally by making sure your wound appears to be pink and moist, and is free of infection.    · Wash your hands again. Put on a new pair of gloves.  · Clean and dress the wound as directed by your healthcare provider or nurse. Don't put anything in the wound that is not prescribed or directed by your  healthcare provider. If you have a drain or tube, be careful not to pull on it. Make sure to secure the drain or tube as well.  · Put all unused supplies in a clean plastic bag. Seal the bag and store it in a clean, dry area between dressing changes.   · Be sure to wash your hands again.  Call your healthcare provider  Call your healthcare provider if you see any of the following signs of a problem:  · Bleeding that soaks the dressing  · Pink fluid weeping from the wound  · Increased drainage or drainage that is yellow, yellow-green, or foul-smelling  · Increased swelling or pain, or redness or swelling in the skin around the wound  · A change in the color of the wound, or if streaks develop in a direction away from the wound  · The area between any stitches opens up  · An increase in the size of the wound  · A fever of 100.4°F (38°C) or higher, or as directed by your healthcare provider  · Chills, increased fatigue, or a loss of appetite      Date Last Reviewed: 7/1/2017  © 1906-0956 [employer] [address] [place] [address], [place] [address]. All rights reserved. This information is not intended as a substitute for professional medical care. Always follow your healthcare professional's instructions.            No

## 2022-03-28 NOTE — DISCHARGE NOTE PROVIDER - NSCORESITESY/N_GEN_A_CORE_RD
"Subjective      Louie Obrien is a 4 m.o. male who is brought in for this well child visit.    History was provided by the parents.    Birth History   • Birth     Length: 50.8 cm (20\")     Weight: 3380 g (7 lb 7.2 oz)   • Apgar     One: 8     Five: 9   • Delivery Method: , Low Transverse   • Gestation Age: 39 2/7 wks       The following portions of the patient's history were reviewed and updated as appropriate: allergies, current medications, past family history, past medical history, past social history, past surgical history and problem list.    Current Issues:  Current concerns include Nasal Congestion.  Any Specialty or Emergency Care since last visit?No    Any concerns with how your child sees? No  Any concerns with how your child hears? No    Review of Nutrition:  Current diet: Formula (Parents Choice Pro Advance)  Current feeding pattern: Every couple of hours  Difficulties with feeding? no  Current stooling frequency: 1 to 3 times a day    Review of Sleep:  Current sleep pattern:   Hours per night: 8-10   # of awakenings: 2   Naps: 2    Social Screening:  Who lives in the home with the infant? Mother, Father, Sister  Current child-care arrangements: in home: primary caregiver is mother  Parental coping and self-care: doing well; no concerns  Secondhand smoke exposure? no  Any concerns for food or housing insecurity? Would you like to see our  for resources? No    Development:  Do you have any concerns about your child's development?  No    Developmental Screening from Rooming Flowsheet:  Developmental 4 Months Appropriate     Question Response Comments    Gurgles, coos, babbles, or similar sounds Yes  Yes on 3/28/2022 (Age - 1yrs)    Follows parent's movements by turning head from one side to facing directly forward Yes  Yes on 3/28/2022 (Age - 1yrs)    Follows parent's movements by turning head from one side almost all the way to the other side Yes  Yes on 3/28/2022 (Age " - 1yrs)    Lifts head off ground when lying prone Yes  Yes on 3/28/2022 (Age - 1yrs)    Lifts head to 45' off ground when lying prone Yes  Yes on 3/28/2022 (Age - 1yrs)    Lifts head to 90' off ground when lying prone Yes  Yes on 3/28/2022 (Age - 1yrs)    Laughs out loud without being tickled or touched Yes  Yes on 3/28/2022 (Age - 1yrs)    Plays with hands by touching them together Yes  Yes on 3/28/2022 (Age - 1yrs)    Will follow parent's movements by turning head all the way from one side to the other Yes  Yes on 3/28/2022 (Age - 1yrs)           ___________________________________________________________________________________________________________________________________________    Objective      [place] Metabolic Screen: ALL COMPONENTS NORMAL.    Immunization History   Administered Date(s) Administered   • DTaP / Hep B / IPV 2022, 2022   • Hep B, Adolescent or Pediatric 2021   • Hib (PRP-OMP) 2022, 2022   • Pneumococcal Conjugate 13-Valent (PCV13) 2022   • Rotavirus Monovalent 2022, 2022       Growth parameters are noted and are appropriate for age.    Vitals:    22 1508   Pulse: 132   Resp: 32   Temp: 98.5 °F (36.9 °C)   SpO2: 100%       Appearance: no acute distress, alert, well-nourished, well-tended appearance  Head/Neck: normocephalic, anterior fontanelle soft open and flat, sutures well approximated, neck supple, no masses appreciated, no lymphadenopathy  Eyes: pupils equal and round, +red reflex bilaterally, conjunctiva normal, sclera nonicteric, no discharge  Ears: external auditory canals normal, tympanic membranes normal bilaterally  Nose: external nose normal, nares patent  Throat: moist mucous membranes, lip appearance normal, normal dentition for age. gums pink, non-swollen, no bleeding. Tongue moist and normal. Hard and soft palate intact  Lungs: breathing comfortably, clear to auscultation bilaterally. No wheezes, rales, or rhonchi  Heart:  regular rate and rhythm, normal S1 and S2, no murmurs, rubs, or gallops  Abdomen: +bowel sounds, soft, nontender, nondistended, no hepatosplenomegaly, no masses palpated.   Genitourinary: normal external genitalia, anus patent  Musculoskeletal: negative Ortolani and Mohr maneuvers. Normal range of motion of all 4 extremities.   Spine: no scoliosis, no sacral pits or deborah  Skin: normal color, skin pink, no rashes, no lesions, no jaundice  Neuro: actively moves all extremities. Tone normal in all 4 extremities     Assessment/Plan   Healthy 4 m.o. male infant.      Diagnoses and all orders for this visit:    1. Encounter for well child visit at 4 months of age (Primary)  Assessment & Plan:  Exam unremarkable.  Growing and developing well.  Follow-up in 6 months.  Vaccines updated today.      Other orders  -     DTaP HepB IPV Combined Vaccine IM  -     Rotavirus Vaccine MonoValent 2 Dose Oral  -     HiB PRP-OMP Conjugate Vaccine 3 Dose IM  -     Pneumococcal Conjugate Vaccine 13-Valent All (PCV13)    Growing and developing well.  Age appropriate anticipatory guidance regarding growth, development, vaccination, safety, diet and sleep discussed and handout given to caregiver.     Return in about 2 months (around 5/28/2022) for Recheck.                Yes

## 2022-04-04 NOTE — ED ADULT NURSE REASSESSMENT NOTE - NS ED NURSE REASSESS COMMENT FT1
pt seen and re-evalauted by ed attending , d/c read in stable condition heplokc removed , pt educated asbout the importance of BP management and the need to f/u with pmd on a regular basis Complex Repair And Dorsal Nasal Flap Text: The defect edges were debeveled with a #15 scalpel blade.  The primary defect was closed partially with a complex linear closure.  Given the location of the remaining defect, shape of the defect and the proximity to free margins a dorsal nasal flap was deemed most appropriate for complete closure of the defect.  Using a sterile surgical marker, an appropriate flap was drawn incorporating the defect and placing the expected incisions within the relaxed skin tension lines where possible.    The area thus outlined was incised deep to adipose tissue with a #15 scalpel blade.  The skin margins were undermined to an appropriate distance in all directions utilizing iris scissors.

## 2022-06-27 NOTE — SPEECH LANGUAGE PATHOLOGY EVALUATION - RATE
Do these exercises 1-2 times per day. 1. Sit up tall in a sturdy chair. Cross your arms and stand up. Return to sitting in a controlled manner. Do this 10 times. 2. Sit up tall in chair. Lift one leg up and across to touch your heel to the inner part of your opposite knee. Return foot to floor then repeat touching the same exact part of your knee each time. Do this 10 times with each leg. 3. Stand and hold a target (thumb) at arms length in front of your eyes. Keep eyes focused on thumb. Turn eyes, head and target together left and right. Do 10 turns, then repeat in up and down direction. 4. Stand next to bed for safety. Start with feet together and slide one foot forward until the heel is even with the base of the big toe of the other. Cross arms and balance for 30 seconds. Then practice with the other foot forward. 5. Stand tall at the countertop and hold on lightly. Kick one leg forward, out to the side and backward, keeping leg straight, and balancing. Do 10 times in each direction, then repeat on the other leg.
intact

## 2022-07-18 NOTE — PHYSICAL THERAPY INITIAL EVALUATION ADULT - WEIGHT-BEARING RESTRICTIONS, REHAB EVAL
Simple: Patient demonstrates quick and easy understanding/Verbalized Understanding
full weight-bearing

## 2024-04-12 NOTE — ED ADULT NURSE NOTE - NS ED NURSE RECORD ANOTHER VITAL SIGN
Results are normal. Continue with current management if on medications. Continue with lifestyle changes.  There is mild arthritis noted. Yes

## 2024-10-20 NOTE — PROGRESS NOTE ADULT - REASON FOR ADMISSION
Loss of vision in left eye
PAST MEDICAL HISTORY:  Asthma     Back pain     Depression     GERD (gastroesophageal reflux disease)     HTN (hypertension)     Migraines     Neck pain     RAFA (obstructive sleep apnea)     Pseudogout     Renal stones     Severe obesity (BMI >= 40)

## 2025-05-02 ENCOUNTER — INPATIENT (INPATIENT)
Facility: HOSPITAL | Age: 70
LOS: 12 days | Discharge: NURSING, REHAB OR LONG-TERM CARE | DRG: 303 | End: 2025-05-15
Attending: INTERNAL MEDICINE | Admitting: INTERNAL MEDICINE
Payer: SELF-PAY

## 2025-05-02 VITALS
HEART RATE: 75 BPM | OXYGEN SATURATION: 99 % | TEMPERATURE: 98 F | WEIGHT: 229.94 LBS | HEIGHT: 70 IN | RESPIRATION RATE: 16 BRPM | DIASTOLIC BLOOD PRESSURE: 83 MMHG | SYSTOLIC BLOOD PRESSURE: 141 MMHG

## 2025-05-02 DIAGNOSIS — Z86.69 PERSONAL HISTORY OF OTHER DISEASES OF THE NERVOUS SYSTEM AND SENSE ORGANS: Chronic | ICD-10-CM

## 2025-05-02 DIAGNOSIS — I25.10 ATHEROSCLEROTIC HEART DISEASE OF NATIVE CORONARY ARTERY WITHOUT ANGINA PECTORIS: ICD-10-CM

## 2025-05-02 LAB
ANION GAP SERPL CALC-SCNC: 15 MMOL/L — SIGNIFICANT CHANGE UP (ref 5–17)
BUN SERPL-MCNC: 35 MG/DL — HIGH (ref 7–23)
CALCIUM SERPL-MCNC: 9.3 MG/DL — SIGNIFICANT CHANGE UP (ref 8.4–10.5)
CHLORIDE SERPL-SCNC: 102 MMOL/L — SIGNIFICANT CHANGE UP (ref 96–108)
CO2 SERPL-SCNC: 22 MMOL/L — SIGNIFICANT CHANGE UP (ref 22–31)
CREAT SERPL-MCNC: 1.73 MG/DL — HIGH (ref 0.5–1.3)
EGFR: 42 ML/MIN/1.73M2 — LOW
EGFR: 42 ML/MIN/1.73M2 — LOW
GLUCOSE SERPL-MCNC: 126 MG/DL — HIGH (ref 70–99)
HCT VFR BLD CALC: 37.9 % — LOW (ref 39–50)
HGB BLD-MCNC: 11.8 G/DL — LOW (ref 13–17)
MCHC RBC-ENTMCNC: 22.3 PG — LOW (ref 27–34)
MCHC RBC-ENTMCNC: 31.1 G/DL — LOW (ref 32–36)
MCV RBC AUTO: 71.8 FL — LOW (ref 80–100)
NRBC BLD AUTO-RTO: 0 /100 WBCS — SIGNIFICANT CHANGE UP (ref 0–0)
PLATELET # BLD AUTO: 381 K/UL — SIGNIFICANT CHANGE UP (ref 150–400)
POTASSIUM SERPL-MCNC: 5.1 MMOL/L — SIGNIFICANT CHANGE UP (ref 3.5–5.3)
POTASSIUM SERPL-SCNC: 5.1 MMOL/L — SIGNIFICANT CHANGE UP (ref 3.5–5.3)
RBC # BLD: 5.28 M/UL — SIGNIFICANT CHANGE UP (ref 4.2–5.8)
RBC # FLD: 17.2 % — HIGH (ref 10.3–14.5)
SODIUM SERPL-SCNC: 139 MMOL/L — SIGNIFICANT CHANGE UP (ref 135–145)
WBC # BLD: 9.64 K/UL — SIGNIFICANT CHANGE UP (ref 3.8–10.5)
WBC # FLD AUTO: 9.64 K/UL — SIGNIFICANT CHANGE UP (ref 3.8–10.5)

## 2025-05-02 PROCEDURE — 99152 MOD SED SAME PHYS/QHP 5/>YRS: CPT

## 2025-05-02 PROCEDURE — 93454 CORONARY ARTERY ANGIO S&I: CPT | Mod: 26

## 2025-05-02 PROCEDURE — 93010 ELECTROCARDIOGRAM REPORT: CPT

## 2025-05-02 RX ORDER — SACUBITRIL AND VALSARTAN 6; 6 MG/1; MG/1
1 PELLET ORAL
Refills: 0 | Status: DISCONTINUED | OUTPATIENT
Start: 2025-05-02 | End: 2025-05-02

## 2025-05-02 RX ORDER — SPIRONOLACTONE 25 MG
25 TABLET ORAL DAILY
Refills: 0 | Status: DISCONTINUED | OUTPATIENT
Start: 2025-05-02 | End: 2025-05-02

## 2025-05-02 RX ORDER — SACUBITRIL AND VALSARTAN 6; 6 MG/1; MG/1
1 PELLET ORAL
Refills: 0 | DISCHARGE

## 2025-05-02 RX ORDER — FERROUS SULFATE 137(45) MG
325 TABLET, EXTENDED RELEASE ORAL
Refills: 0 | DISCHARGE

## 2025-05-02 RX ORDER — ASPIRIN 325 MG
81 TABLET ORAL DAILY
Refills: 0 | Status: DISCONTINUED | OUTPATIENT
Start: 2025-05-02 | End: 2025-05-15

## 2025-05-02 RX ORDER — ATORVASTATIN CALCIUM 80 MG/1
40 TABLET, FILM COATED ORAL AT BEDTIME
Refills: 0 | Status: DISCONTINUED | OUTPATIENT
Start: 2025-05-02 | End: 2025-05-15

## 2025-05-02 RX ADMIN — Medication 25 MILLIGRAM(S): at 20:29

## 2025-05-02 RX ADMIN — Medication 50 MILLILITER(S): at 18:54

## 2025-05-02 RX ADMIN — ATORVASTATIN CALCIUM 40 MILLIGRAM(S): 80 TABLET, FILM COATED ORAL at 20:29

## 2025-05-02 NOTE — CONSULT NOTE ADULT - PSYCHIATRIC
Likely secondary to hepatic cirrhosis and development of splenomegaly  Has worsened and associated with hgb drop, transfuse to >50K   negative

## 2025-05-02 NOTE — H&P CARDIOLOGY - HISTORY OF PRESENT ILLNESS
71 yo M with PMHx of HTN, HLD, CKD3 presented to [facility] for           NST 2/10/20  1. THere is scintigraphic evidence for an extensive RCA territory myocardial infarct, no noted ischemia.    2. There is abnormal left ventricular contractility, diminished calculated ejection fraction and wall motion abnormalities, as described above. Overall post stress ejection fraction was 28%   3. Please see the cardiac test report for EKG findings and symptoms during the procedure    TTE 2/7/20   1. Left ventricular ejection fraction, by visual estimation, is 40 to 45%.   2. Mildly decreased global left ventricular systolic function.   3. Mid and apical anterior wall is abnormal as described above.   4. Global cardiomyopathy.   5. Spectral Doppler shows impaired relaxation pattern of left ventricular myocardial filling (Grade I diastolic dysfunction).   6. There is mild concentric left ventricular hypertrophy.   7. Normal right ventricular size and function.   8. There is no evidence of pericardial effusion.   9. Mild thickening and calcification of the anterior and posterior mitral valve leaflets.  10. Trace mitral valve regurgitation.     This is incomplete document     71 yo M with PMHx of HTN, HLD, CKD3 presented to [facility] for           NST 2/10/20  1. THere is scintigraphic evidence for an extensive RCA territory myocardial infarct, no noted ischemia.    2. There is abnormal left ventricular contractility, diminished calculated ejection fraction and wall motion abnormalities, as described above. Overall post stress ejection fraction was 28%   3. Please see the cardiac test report for EKG findings and symptoms during the procedure    TTE 2/7/20   1. Left ventricular ejection fraction, by visual estimation, is 40 to 45%.   2. Mildly decreased global left ventricular systolic function.   3. Mid and apical anterior wall is abnormal as described above.   4. Global cardiomyopathy.   5. Spectral Doppler shows impaired relaxation pattern of left ventricular myocardial filling (Grade I diastolic dysfunction).   6. There is mild concentric left ventricular hypertrophy.   7. Normal right ventricular size and function.   8. There is no evidence of pericardial effusion.   9. Mild thickening and calcification of the anterior and posterior mitral valve leaflets.  10. Trace mitral valve regurgitation.      69 yo M with PMHx of HTN, HLD, CKD3 CHF, cardiomyopathy LBBB, Glaucoma, cataracts presented to [facility] ED with c/o SOB associated with cough & congestion. pt hypertensive, tachypneic, tachycardic was placed on BIPAP, s/p Stress test 4/27/25 which reveals a small size, moderate in severity, fixed defect involving the mid inferior & apical inferior segment. there is a samll size partially reversible defect involving the apical septal segment this is consistent with possible scar with per- infarct ischemia involving the LAD Ef ~24% Seen & evaluated by cardiologist & now transferred to [facility] for LHC.  Hi echo from 3/2023 reveals moderate LV dysfunction Ef~30-35% & grade 2 diastolic dysfunction, + troponin                69 yo M with PMHx of HTN, HLD, CKD3 CHF, cardiomyopathy LBBB, Glaucoma, cataracts presented to [facility] ED on 4/25 with c/o SOB associated with cough & congestion. pt hypertensive, tachypneic, tachycardic was placed on BIPAP, s/p Stress test 4/27/25 which reveals a small size, moderate in severity, fixed defect involving the mid inferior & apical inferior segment. There is a small size partially reversible defect involving the apical septal segment this is consistent with possible scar with per- infarct ischemia involving the LAD Ef ~24% Seen & evaluated by cardiologist & now transferred to [facility] for LHC.    Card: ANASTASIA Phan                71 yo M with PMHx of HTN, HLD, CKD3 CHF, cardiomyopathy LBBB, Glaucoma, cataracts presented to [facility] ED on 4/25 with c/o SOB associated with cough & congestion. pt hypertensive, tachypneic, tachycardic was placed on BIPAP, s/p Stress test 4/27/25 which reveals a small size, moderate in severity, fixed defect involving the mid inferior & apical inferior segment. There is a small size partially reversible defect involving the apical septal segment this is consistent with possible scar with per- infarct ischemia involving the LAD EF ~24% Seen & evaluated by cardiologist & now transferred to [facility] for LHC.    Card: ANASTASIA Phan  Pt's spouse reports that he did not take any medications at home   TTE 4/30/25 severely decrease LV function EF 20-25%, LVH, global LV hypokinesis without regional heterogeneity. Grade 3 diastolic dysfunction.

## 2025-05-02 NOTE — CHART NOTE - NSCHARTNOTEFT_GEN_A_CORE
s/p cath: non obstructive mild CAD  RRA site benign    Admit to tele under Dr. Abdi  Continue to monitor  CHF management   PT evaluation

## 2025-05-02 NOTE — PATIENT PROFILE ADULT - FUNCTIONAL ASSESSMENT - BASIC MOBILITY 6.
2-calculated by average /Not able to assess (calculate score using [facility] averaging method)

## 2025-05-02 NOTE — H&P CARDIOLOGY - NSICDXPASTMEDICALHX_GEN_ALL_CORE_FT
PAST MEDICAL HISTORY:  CKD (chronic kidney disease) stage 3, GFR 30-59 ml/min     Gout     HLD (hyperlipidemia)     Hypertension      PAST MEDICAL HISTORY:  Cataract     CKD (chronic kidney disease) stage 3, GFR 30-59 ml/min     Glaucoma     Gout     HLD (hyperlipidemia)     Hypertension

## 2025-05-02 NOTE — CONSULT NOTE ADULT - SUBJECTIVE AND OBJECTIVE BOX
CHIEF COMPLAINT: CHF + stress test . cardiomyopathy LBBB      PAST MEDICAL & SURGICAL HISTORY:  Hypertension      Gout      HLD (hyperlipidemia)      CKD (chronic kidney disease) stage 3, GFR 30-59 ml/min      Glaucoma      Cataract      H/O: glaucoma          HPI: 71 yo M with PMHx of HTN, HLD, CKD3 CHF, cardiomyopathy LBBB, Glaucoma, cataracts presented to [facility] ED on  with c/o SOB associated with cough & congestion. pt hypertensive, tachypneic, tachycardic was placed on BIPAP, s/p Stress test 25 which reveals a small size, moderate in severity, fixed defect involving the mid inferior & apical inferior segment. There is a small size partially reversible defect involving the apical septal segment this is consistent with possible scar with per- infarct ischemia involving the LAD EF ~24%. trasnferred for LHC.     found to have nonobstructive disease    states he feels well, appears lethargic post sedation. spironolactone, jardiance ,and entresto have been held precath. appears euvolemic                  PREVIOUS DIAGNOSTIC TESTING:      ECHO  FINDINGS:    STRESS  FINDINGS:    CATHETERIZATION  FINDINGS:    ELECTROPHYSIOLOGY STUDY  FINDINGS:    CAROTID ULTRASOUND:  FINDINGS    VENOUS DUPLEX SCAN:  FINDINGS:    CHEST CT PULMONARY ANGIO with IV Contrast:  FINDINGS:  MEDICATIONS  (STANDING):  aspirin enteric coated 81 milliGRAM(s) Oral daily  atorvastatin 40 milliGRAM(s) Oral at bedtime  hydrALAZINE 25 milliGRAM(s) Oral every 8 hours  sacubitril 24 mG/valsartan 26 mG 1 Tablet(s) Oral two times a day  spironolactone 25 milliGRAM(s) Oral daily    MEDICATIONS  (PRN):      FAMILY HISTORY:  No pertinent family history        SOCIAL HISTORY:    CIGARETTES:    ALCOHOL:    REVIEW OF SYSTEMS:    CONSTITUTIONAL: No fever, weight loss, chills, shakes, or fatigue  EYES: No eye pain, visual disturbances, or discharge  ENMT:  No difficulty hearing, tinnitus, vertigo; No sinus or throat pain  NECK: No pain or stiffness  BREASTS: No pain, masses, or nipple discharge  RESPIRATORY: No cough, wheezing, hemoptysis, or shortness of breath  CARDIOVASCULAR: No chest pain, dyspnea, palpitations, dizziness, syncope, paroxysmal nocturnal dyspnea, orthopnea, or arm or leg swelling  GASTROINTESTINAL: No abdominal  or epigastric pain, nausea, vomiting, hematemesis, diarrhea, constipation, melena or bright red blood.  GENITOURINARY: No dysuria, nocturia, hematuria, or urinary incontinence  NEUROLOGICAL: No headaches, memory loss, slurred speech, limb weakness, loss of strength, numbness, or tremors  SKIN: No itching, burning, rashes, or lesions   LYMPH NODES: No enlarged glands  ENDOCRINE: No heat or cold intolerance, or hair loss  MUSCULOSKELETAL: No joint pain or swelling, muscle, back, or extremity pain  PSYCHIATRIC: No depression, anxiety, or difficulty sleeping  HEME/LYMPH: No easy bruising or bleeding gums  ALLERY AND IMMUNOLOGIC: No hives or rash.      Vital Signs Last 24 Hrs  T(C): 36.8 (02 May 2025 16:40), Max: 36.8 (02 May 2025 16:07)  T(F): 98.3 (02 May 2025 16:40), Max: 98.3 (02 May 2025 16:07)  HR: 77 (02 May 2025 18:10) (73 - 79)  BP: 118/60 (02 May 2025 18:10) (118/60 - 154/92)  BP(mean): 83 (02 May 2025 18:10) (83 - 118)  RR: 17 (02 May 2025 18:10) (16 - 18)  SpO2: 94% (02 May 2025 18:10) (94% - 99%)    Parameters below as of 02 May 2025 18:10  Patient On (Oxygen Delivery Method): room air      Daily Height in cm: 177.8 (02 May 2025 11:40)    Daily Weight in k.3 (02 May 2025 07:48)        PHYSICAL EXAM:    GENERAL: In no apparent distress, well nourished, and hydrated.  HEAD:  Atraumatic, Normocephalic  EYES: EOMI, PERRLA, conjunctiva and sclera clear  ENMT: No tonsillar erythema, exudates, or enlargement; Moist mucous membranes, Good dentition, No lesions  NECK: Supple and normal thyroid.  No JVD or carotid bruit.  Carotid pulse is 2+ bilaterally.  HEART: Regular rate and rhythm; No murmurs, rubs, or gallops.  PULMONARY: Clear to auscultation and perfusion.  No rales, wheezing, or rhonchi bilaterally.  ABDOMEN: Soft, Nontender, Nondistended; Bowel sounds present  EXTREMITIES:  2+ Peripheral Pulses, No clubbing, cyanosis, or edema  LYMPH: No lymphadenopathy noted  NEUROLOGICAL: Grossly nonfocal          INTERPRETATION OF TELEMETRY:    ECG:    I&O's Detail      LABS:                        11.8   9.64  )-----------( 381      ( 02 May 2025 12:07 )             37.9     05-    139  |  102  |  35[H]  ----------------------------<  126[H]  5.1   |  22  |  1.73[H]    Ca    9.3      02 May 2025 12:07            Urinalysis Basic - ( 02 May 2025 12:07 )    Color: x / Appearance: x / SG: x / pH: x  Gluc: 126 mg/dL / Ketone: x  / Bili: x / Urobili: x   Blood: x / Protein: x / Nitrite: x   Leuk Esterase: x / RBC: x / WBC x   Sq Epi: x / Non Sq Epi: x / Bacteria: x      BNP  I&O's Detail    Daily Height in cm: 177.8 (02 May 2025 11:40)    Daily Weight in k.3 (02 May 2025 07:48)    RADIOLOGY & ADDITIONAL STUDIES:

## 2025-05-02 NOTE — H&P CARDIOLOGY - NSICDXPASTSURGICALHX_GEN_ALL_CORE_FT
PAST SURGICAL HISTORY:  No significant past surgical history      PAST SURGICAL HISTORY:  H/O: glaucoma

## 2025-05-02 NOTE — CONSULT NOTE ADULT - ASSESSMENT
69 yr male with HTN DM HLD cardiomyopathy HFrEF chronic renal failure , creatinine 1.5-1.8, presents with acute pulmonary edema , borderline elevated troponin to [facility] [place], + stress test, dielijah , cath today with nonobstructive disease. Vision poor and gait unsteady, lives with family.    advise    1. hold spironoalctone, entresto, jardiance  2. lethargy post cath- would hydrate with 250 cc NS x 5 hr,  assess PO intake and if oral intake today has been poor, would give 500 ccNS  3. continue hydralazine,   4. will check if jesús was on low dose metoprolol , but can start in AM if BP/HR stable  5. check orthostatics in AM  6. physical therapy to assess in AM

## 2025-05-03 LAB
ANION GAP SERPL CALC-SCNC: 10 MMOL/L — SIGNIFICANT CHANGE UP (ref 5–17)
BUN SERPL-MCNC: 38 MG/DL — HIGH (ref 7–23)
CALCIUM SERPL-MCNC: 9.5 MG/DL — SIGNIFICANT CHANGE UP (ref 8.4–10.5)
CHLORIDE SERPL-SCNC: 104 MMOL/L — SIGNIFICANT CHANGE UP (ref 96–108)
CO2 SERPL-SCNC: 25 MMOL/L — SIGNIFICANT CHANGE UP (ref 22–31)
CREAT SERPL-MCNC: 1.68 MG/DL — HIGH (ref 0.5–1.3)
EGFR: 43 ML/MIN/1.73M2 — LOW
EGFR: 43 ML/MIN/1.73M2 — LOW
GLUCOSE SERPL-MCNC: 116 MG/DL — HIGH (ref 70–99)
HCT VFR BLD CALC: 37.3 % — LOW (ref 39–50)
HGB BLD-MCNC: 11.8 G/DL — LOW (ref 13–17)
MAGNESIUM SERPL-MCNC: 2.5 MG/DL — SIGNIFICANT CHANGE UP (ref 1.6–2.6)
MCHC RBC-ENTMCNC: 22.7 PG — LOW (ref 27–34)
MCHC RBC-ENTMCNC: 31.6 G/DL — LOW (ref 32–36)
MCV RBC AUTO: 71.7 FL — LOW (ref 80–100)
NRBC BLD AUTO-RTO: 0 /100 WBCS — SIGNIFICANT CHANGE UP (ref 0–0)
PLATELET # BLD AUTO: 402 K/UL — HIGH (ref 150–400)
POTASSIUM SERPL-MCNC: 5.4 MMOL/L — HIGH (ref 3.5–5.3)
POTASSIUM SERPL-SCNC: 5.4 MMOL/L — HIGH (ref 3.5–5.3)
RBC # BLD: 5.2 M/UL — SIGNIFICANT CHANGE UP (ref 4.2–5.8)
RBC # FLD: 17.2 % — HIGH (ref 10.3–14.5)
SODIUM SERPL-SCNC: 139 MMOL/L — SIGNIFICANT CHANGE UP (ref 135–145)
WBC # BLD: 10.12 K/UL — SIGNIFICANT CHANGE UP (ref 3.8–10.5)
WBC # FLD AUTO: 10.12 K/UL — SIGNIFICANT CHANGE UP (ref 3.8–10.5)

## 2025-05-03 RX ORDER — HEPARIN SODIUM 1000 [USP'U]/ML
5000 INJECTION INTRAVENOUS; SUBCUTANEOUS EVERY 8 HOURS
Refills: 0 | Status: DISCONTINUED | OUTPATIENT
Start: 2025-05-03 | End: 2025-05-15

## 2025-05-03 RX ADMIN — Medication 81 MILLIGRAM(S): at 05:45

## 2025-05-03 RX ADMIN — ATORVASTATIN CALCIUM 40 MILLIGRAM(S): 80 TABLET, FILM COATED ORAL at 21:31

## 2025-05-03 RX ADMIN — Medication 25 MILLIGRAM(S): at 05:45

## 2025-05-03 RX ADMIN — HEPARIN SODIUM 5000 UNIT(S): 1000 INJECTION INTRAVENOUS; SUBCUTANEOUS at 21:31

## 2025-05-03 RX ADMIN — Medication 25 MILLIGRAM(S): at 14:36

## 2025-05-03 RX ADMIN — Medication 25 MILLIGRAM(S): at 21:31

## 2025-05-03 NOTE — PHYSICAL THERAPY INITIAL EVALUATION ADULT - IMPAIRMENTS CONTRIBUTING IMPAIRED BED MOBILITY, REHAB EVAL
decreased flexibility/pain/decreased strength sat EOB on 5/7/25 for approx 15 min balance intially fair - then pprogress to fair pt perform seated therex le's , then talk on phone with family in sitting , BP rechecked while on phone and vss ,/impaired balance/decreased flexibility/pain/decreased strength

## 2025-05-03 NOTE — PHYSICAL THERAPY INITIAL EVALUATION ADULT - MANUAL MUSCLE TESTING RESULTS, REHAB EVAL
Strength is grossly at least 3/5 throughout on BUE, 2-/5 on BLE/grossly assessed due to Strength is grossly at least 3/5 throughout on BUE, 2-/5 on BLE; 5/7/25 3-/5 le's/grossly assessed due to

## 2025-05-03 NOTE — PHYSICAL THERAPY INITIAL EVALUATION ADULT - GAIT DEVIATIONS NOTED, PT EVAL
decreased timbo/increased time in double stance/decreased step length/decreased stride length/decreased swing-to-stance ratio/decreased weight-shifting ability

## 2025-05-03 NOTE — PHYSICAL THERAPY INITIAL EVALUATION ADULT - IMPAIRED TRANSFERS: SIT/STAND, REHAB EVAL
decrease endurance , sit -stand at walker mod of 1 in stand at RW work on  wt shift and balance and postural  reeducation mod of 1 min unsteady/impaired balance/decreased flexibility/pain/impaired postural control/impaired sensory feedback/decreased strength

## 2025-05-03 NOTE — PHYSICAL THERAPY INITIAL EVALUATION ADULT - STRENGTHENING, PT EVAL
GOAL: Patient will improve bilateral UE/LE strength to 5/5, for increased limb stability, to improve gait and facilitate stair negotiation in 2 weeks. GOAL: Patient will improve bilateral UE/LE strength to 5/5, for increased limb stability, to improve gait and facilitate stair negotiation in 4 weeks.

## 2025-05-03 NOTE — PHYSICAL THERAPY INITIAL EVALUATION ADULT - PERTINENT HX OF CURRENT PROBLEM, REHAB EVAL
Pt is a  1 yo M with PMHx of HTN, HLD, CKD3 CHF, cardiomyopathy LBBB, Glaucoma, cataracts presented to  ED on 4/25 with c/o SOB associated with cough & congestion. pt hypertensive, tachypneic, tachycardic was placed on BIPAP, s/p Stress test 4/27/25 which reveals a small size, moderate in severity, fixed defect involving the mid inferior & apical inferior segment. There is a small size partially reversible defect involving the apical septal segment this is consistent with possible scar with per- infarct ischemia involving the LAD EF ~24%. transferred for [facility]. Pt now s/p cardiac cath on 5/2/25. Pt found to have nonobstructive disease. Pt is a 68 yo M with PMHx of HTN, HLD, CKD3 CHF, cardiomyopathy LBBB, Glaucoma, cataracts presented to [facility] ED on 4/25 with c/o SOB associated with cough & congestion. pt hypertensive, tachypneic, tachycardic was placed on BIPAP, s/p Stress test 4/27/25 which reveals a small size, moderate in severity, fixed defect involving the mid inferior & apical inferior segment. There is a small size partially reversible defect involving the apical septal segment this is consistent with possible scar with per- infarct ischemia involving the LAD EF ~24%. transferred for [facility]. Pt now s/p cardiac cath on 5/2/25. Pt found to have nonobstructive disease.

## 2025-05-03 NOTE — CONSULT NOTE ADULT - SUBJECTIVE AND OBJECTIVE BOX
HPI:   69 yo M with PMHx of HTN, HLD, CKD3 CHF, cardiomyopathy LBBB, Glaucoma, cataracts presented to [facility] ED on 4/25 with c/o SOB associated with cough & congestion. pt hypertensive, tachypneic, tachycardic was placed on BIPAP, s/p Stress test 4/27/25 which reveals a small size, moderate in severity, fixed defect involving the mid inferior & apical inferior segment. There is a small size partially reversible defect involving the apical septal segment this is consistent with possible scar with per- infarct ischemia involving the LAD EF ~24% Seen & evaluated by cardiologist & now transferred to [facility] for LHC. s/p cath non obstructive CAD    Card: ANASTASIA Phan  Pt's spouse reports that he did not take any medications at home   TTE 4/30/25 severely decrease LV function EF 20-25%, LVH, global LV hypokinesis without regional heterogeneity. Grade 3 diastolic dysfunction.              (02 May 2025 07:48)      PAST MEDICAL & SURGICAL HISTORY:  Hypertension      Gout      HLD (hyperlipidemia)      CKD (chronic kidney disease) stage 3, GFR 30-59 ml/min      Glaucoma      Cataract      H/O: glaucoma          Review of Systems:   CONSTITUTIONAL: No fever, weight loss, or fatigue  EYES: No eye pain, visual disturbances, or discharge Legally blind.   ENMT:  No difficulty hearing, tinnitus, vertigo; No sinus or throat pain  NECK: No pain or stiffness  BREASTS: No pain, masses, or nipple discharge  RESPIRATORY: No cough, wheezing, chills or hemoptysis; + shortness of breath  CARDIOVASCULAR: No chest pain, palpitations, dizziness, or leg swelling  GASTROINTESTINAL: No abdominal or epigastric pain. No nausea, vomiting, or hematemesis; No diarrhea or constipation. No melena or hematochezia.  GENITOURINARY: No dysuria, frequency, hematuria, or incontinence  NEUROLOGICAL: No headaches, memory loss, loss of strength, numbness, or tremors  SKIN: No itching, burning, rashes, or lesions   LYMPH NODES: No enlarged glands  ENDOCRINE: No heat or cold intolerance; No hair loss  MUSCULOSKELETAL: No joint pain or swelling; No muscle, back, or extremity pain  PSYCHIATRIC: No depression, anxiety, mood swings, or difficulty sleeping  HEME/LYMPH: No easy bruising, or bleeding gums  ALLERY AND IMMUNOLOGIC: No hives or eczema    Allergies    No Known Allergies    Intolerances        Social History:     FAMILY HISTORY:  No pertinent family history        MEDICATIONS  (STANDING):  aspirin enteric coated 81 milliGRAM(s) Oral daily  atorvastatin 40 milliGRAM(s) Oral at bedtime  hydrALAZINE 25 milliGRAM(s) Oral every 8 hours  sodium chloride 0.9%. 1000 milliLiter(s) (50 mL/Hr) IV Continuous <Continuous>    MEDICATIONS  (PRN):        CAPILLARY BLOOD GLUCOSE        I&O's Summary    02 May 2025 07:01  -  03 May 2025 07:00  --------------------------------------------------------  IN: 0 mL / OUT: 600 mL / NET: -600 mL        PHYSICAL EXAM:  GENERAL: NAD  HEAD:  Atraumatic, Normocephalic  EYES: Legally blind  NECK: Supple, No JVD  CHEST/LUNG: Clear to auscultation bilaterally; No wheeze  HEART: Regular rate and rhythm; No murmurs, rubs, or gallops  ABDOMEN: Soft, Nontender, Nondistended; Bowel sounds present  EXTREMITIES:  2+ Peripheral Pulses, No clubbing, cyanosis, or edema  PSYCH: AAOx3  NEUROLOGY: non-focal  SKIN: No rashes or lesions    LABS:                        11.8   10.12 )-----------( 402      ( 03 May 2025 01:01 )             37.3     05-03    139  |  104  |  38[H]  ----------------------------<  116[H]  5.4[H]   |  25  |  1.68[H]    Ca    9.5      03 May 2025 01:01  Mg     2.5     05-03            Urinalysis Basic - ( 03 May 2025 01:01 )    Color: x / Appearance: x / SG: x / pH: x  Gluc: 116 mg/dL / Ketone: x  / Bili: x / Urobili: x   Blood: x / Protein: x / Nitrite: x   Leuk Esterase: x / RBC: x / WBC x   Sq Epi: x / Non Sq Epi: x / Bacteria: x    EKG SR    RADIOLOGY & ADDITIONAL TESTS:    Imaging Personally Reviewed:    Consultant(s) Notes Reviewed:      Care Discussed with Consultants/Other Providers:

## 2025-05-03 NOTE — PHYSICAL THERAPY INITIAL EVALUATION ADULT - BED MOBILITY LIMITATIONS, REHAB EVAL
decreased ability to use legs for bridging/pushing decreased ability to use arms for pushing/pulling/decreased ability to use legs for bridging/pushing/impaired ability to control trunk for mobility

## 2025-05-03 NOTE — PHYSICAL THERAPY INITIAL EVALUATION ADULT - NSPTDISCHREC_GEN_A_CORE
TBD once functional evaluation is completed. TBD once functional evaluation is completed.; 5/7/25 PT recommend subacute rehab to increase fxl mobiltiy , strength, balance, endurance , fall prevention education continued , if home pt need assist all fxl activity and adl's and Home PT/Sub-acute Rehab

## 2025-05-03 NOTE — PHYSICAL THERAPY INITIAL EVALUATION ADULT - BALANCE TRAINING, PT EVAL
GOAL: Patient will demonstrate improved static/dynamic balance to good, in order to improve stability, decrease fall risk and increase independence with ADLs within 2 weeks. GOAL: Patient will demonstrate improved static/dynamic balance to good, in order to improve stability, decrease fall risk and increase independence with ADLs within 4 weeks.

## 2025-05-03 NOTE — PHYSICAL THERAPY INITIAL EVALUATION ADULT - ADDITIONAL COMMENTS
Pt states he lives with his wife and grandchildren in a house with 0 steps to enter and 1 flight of stairs inside, +HR. Pt is legally blind. Pt reports he occasionally uses a RW for ambulation. Pt states he lives with his wife and grandchildren in a house with 0 steps to enter and 1 flight of stairs inside, +HR. Pt is legally blind. Pt reports he occasionally uses a RW for ambulation. no caregiver id ; 5/7/25 during session sister in law Tanesha called with brother naheed and pt wish for PT to speak with re session , info given at pt request

## 2025-05-03 NOTE — PHYSICAL THERAPY INITIAL EVALUATION ADULT - IMPAIRMENTS CONTRIBUTING TO GAIT DEVIATIONS, PT EVAL
1. Have you been to the ER, urgent care clinic since your last visit? Hospitalized since your last visit? No    2. Have you seen or consulted any other health care providers outside of the [address] [place] [address] since your last visit? Include any pap smears or colon screening.  PCP  Chief Complaint   Patient presents with    Follow-up    Elevated PSA    Prostate Cancer    Benign Prostatic Hypertrophy     Visit Vitals  /74 (BP 1 Location: Left arm, BP Patient Position: Sitting, BP Cuff Size: Adult)   Pulse 60   Temp 96.9 °F (36.1 °C) (Temporal)   Resp 22   Ht 6' (1.829 m)   Wt 205 lb (93 kg)   SpO2 98%   BMI 27.80 kg/m² decrease endurance ,min unsteady ; pt pain 5/10 in stand and no pain in bed (PT coordinated with pt and chiki Rowland earlier in am for premedication for session )/impaired balance/pain/impaired postural control/impaired sensory feedback/decreased strength

## 2025-05-03 NOTE — CONSULT NOTE ADULT - ASSESSMENT
69 m with     Cardiomyopathy  - hold Entresto, Aldactone    HTN  - control    CKD  - follow Cr, lytes     DVT prophylaxis    Further action as per clinical course    Ar Abdi MD phone [phone]

## 2025-05-03 NOTE — PHYSICAL THERAPY INITIAL EVALUATION ADULT - IMPAIRMENTS FOUND, PT EVAL
CHRIS. BAILEE SHE HAD GREEN mucus coming out of eye. Here eyes were glued shut the next day. Mom had Ofloxacin from before and this is day 5 of mom using it and her eye is very red . The left eye still has discharge. No fever. She has been cleaning the drainage out of eye.   Drops not .  Mother took 130pm apt. KCB today   aerobic capacity/endurance/gait, locomotion, and balance/muscle strength aerobic capacity/endurance/gait, locomotion, and balance/muscle strength/posture/sensory integrity/visual motor

## 2025-05-03 NOTE — PHYSICAL THERAPY INITIAL EVALUATION ADULT - TRANSFER TRAINING, PT EVAL
GOAL: Patient will perform all transfers independently, in 2 weeks. GOAL: Patient will perform all transfers independently, in 4 weeks at

## 2025-05-03 NOTE — PHYSICAL THERAPY INITIAL EVALUATION ADULT - BED MOBILITY TRAINING, PT EVAL
GOAL: Patient will perform bed mobility independently, in 2 weeks. GOAL: Patient will perform bed mobility independently, in 4 weeks.

## 2025-05-03 NOTE — PHYSICAL THERAPY INITIAL EVALUATION ADULT - GAIT TRAINING, PT EVAL
GOAL: Patient will ambulate 100 feet with appropriate assistive device as needed, in 2 weeks. GOAL: Patient will ambulate 100 feet with appropriate assistive device as needed, in 4 weeks with RW

## 2025-05-03 NOTE — PHYSICAL THERAPY INITIAL EVALUATION ADULT - NSPTDMEREC_GEN_A_CORE
pt has a rolling walker ; can benfit from a poly fly  w/c and shower chair if does not already own if plan is for home

## 2025-05-04 LAB
ANION GAP SERPL CALC-SCNC: 12 MMOL/L — SIGNIFICANT CHANGE UP (ref 5–17)
BUN SERPL-MCNC: 33 MG/DL — HIGH (ref 7–23)
CALCIUM SERPL-MCNC: 9.2 MG/DL — SIGNIFICANT CHANGE UP (ref 8.4–10.5)
CHLORIDE SERPL-SCNC: 103 MMOL/L — SIGNIFICANT CHANGE UP (ref 96–108)
CO2 SERPL-SCNC: 23 MMOL/L — SIGNIFICANT CHANGE UP (ref 22–31)
CREAT SERPL-MCNC: 1.66 MG/DL — HIGH (ref 0.5–1.3)
EGFR: 44 ML/MIN/1.73M2 — LOW
EGFR: 44 ML/MIN/1.73M2 — LOW
GLUCOSE SERPL-MCNC: 123 MG/DL — HIGH (ref 70–99)
POTASSIUM SERPL-MCNC: 4.5 MMOL/L — SIGNIFICANT CHANGE UP (ref 3.5–5.3)
POTASSIUM SERPL-SCNC: 4.5 MMOL/L — SIGNIFICANT CHANGE UP (ref 3.5–5.3)
SODIUM SERPL-SCNC: 138 MMOL/L — SIGNIFICANT CHANGE UP (ref 135–145)

## 2025-05-04 PROCEDURE — 73560 X-RAY EXAM OF KNEE 1 OR 2: CPT | Mod: 26,RT

## 2025-05-04 RX ORDER — SACUBITRIL AND VALSARTAN 6; 6 MG/1; MG/1
1 PELLET ORAL
Refills: 0 | Status: DISCONTINUED | OUTPATIENT
Start: 2025-05-04 | End: 2025-05-15

## 2025-05-04 RX ORDER — METOPROLOL SUCCINATE 50 MG/1
25 TABLET, EXTENDED RELEASE ORAL DAILY
Refills: 0 | Status: DISCONTINUED | OUTPATIENT
Start: 2025-05-04 | End: 2025-05-10

## 2025-05-04 RX ADMIN — Medication 25 MILLIGRAM(S): at 21:50

## 2025-05-04 RX ADMIN — HEPARIN SODIUM 5000 UNIT(S): 1000 INJECTION INTRAVENOUS; SUBCUTANEOUS at 13:36

## 2025-05-04 RX ADMIN — HEPARIN SODIUM 5000 UNIT(S): 1000 INJECTION INTRAVENOUS; SUBCUTANEOUS at 05:43

## 2025-05-04 RX ADMIN — Medication 25 MILLIGRAM(S): at 05:43

## 2025-05-04 RX ADMIN — HEPARIN SODIUM 5000 UNIT(S): 1000 INJECTION INTRAVENOUS; SUBCUTANEOUS at 21:49

## 2025-05-04 RX ADMIN — Medication 25 MILLIGRAM(S): at 13:36

## 2025-05-04 RX ADMIN — Medication 81 MILLIGRAM(S): at 13:35

## 2025-05-04 RX ADMIN — ATORVASTATIN CALCIUM 40 MILLIGRAM(S): 80 TABLET, FILM COATED ORAL at 21:50

## 2025-05-04 NOTE — PROGRESS NOTE ADULT - SUBJECTIVE AND OBJECTIVE BOX
INTERVAL HPI/OVERNIGHT EVENTS: patient remains with knee pain, no chest pian no shortness of breath.   +  knee pain    MEDICATIONS  (STANDING):  aspirin enteric coated 81 milliGRAM(s) Oral daily  atorvastatin 40 milliGRAM(s) Oral at bedtime  heparin   Injectable 5000 Unit(s) SubCutaneous every 8 hours  hydrALAZINE 25 milliGRAM(s) Oral every 8 hours  sodium chloride 0.9%. 1000 milliLiter(s) (50 mL/Hr) IV Continuous <Continuous>    MEDICATIONS  (PRN):      Allergies    No Known Allergies    Intolerances      ROS:  General: Pt denies recent weight loss/fever/chills    Neurological: denies numbness or  sensation loss    Cardiovascular: denies chest pain/palpitations/leg edema    Respiratory and Thorax: denies SOB/cough/wheezing    Gastrointestinal: denies abdominal pain/diarrhea/constipation/bloody stool    Genitourinary: denies urinary frequency/urgency/ dysuria    Musculoskeletal: denies joint pain or swelling, denies restricted motion    Hematologic: denies abnormal bleeding  	    	  	    		        	    	            Vital Signs Last 24 Hrs  T(C): 36.9 (04 May 2025 11:39), Max: 36.9 (04 May 2025 11:39)  T(F): 98.5 (04 May 2025 11:39), Max: 98.5 (04 May 2025 11:39)  HR: 89 (04 May 2025 11:39) (78 - 89)  BP: 156/82 (04 May 2025 11:39) (145/92 - 156/85)  BP(mean): 107 (04 May 2025 11:39) (107 - 107)  RR: 18 (04 May 2025 05:01) (18 - 18)  SpO2: 97% (04 May 2025 11:39) (97% - 98%)    Parameters below as of 04 May 2025 11:39  Patient On (Oxygen Delivery Method): room air      Daily     Daily     05-03 @ 07:01  -  05-04 @ 07:00  --------------------------------------------------------  IN: 240 mL / OUT: 400 mL / NET: -160 mL    05-04 @ 07:01  -  05-04 @ 19:10  --------------------------------------------------------  IN: 0 mL / OUT: 300 mL / NET: -300 mL      Physical Exam:    wdwn male  no JVD  cor RRR  lung clear  abd soft   ext no edema        LABS:                        11.8   10.12 )-----------( 402      ( 03 May 2025 01:01 )             37.3     05-04    138  |  103  |  33[H]  ----------------------------<  123[H]  4.5   |  23  |  1.66[H]    Ca    9.2      04 May 2025 07:05  Mg     2.5     05-03        Urinalysis Basic - ( 04 May 2025 07:05 )    Color: x / Appearance: x / SG: x / pH: x  Gluc: 123 mg/dL / Ketone: x  / Bili: x / Urobili: x   Blood: x / Protein: x / Nitrite: x   Leuk Esterase: x / RBC: x / WBC x   Sq Epi: x / Non Sq Epi: x / Bacteria: x        RADIOLOGY & ADDITIONAL TESTS:    TELE:    EKG:

## 2025-05-04 NOTE — PROGRESS NOTE ADULT - ASSESSMENT
69 yr male with HTN DM HLD cardiomyopathy HFrEF chronic renal failure , creatinine 1.5-1.8, presents with acute pulmonary edema , borderline elevated troponin to [facility], + stress test, dielijah , cath today with nonobstructive disease. Vision poor and gait unsteady, lives with family. having bilateral knee pain    advise    1.  restart entresto 24/26  BID  2. restart metoprolol XL 25 mg daily  3. continue low dose hydtalazine  4 will restart spironolactone if creatinine stable  5 medicine evaluation of knee pain in progress

## 2025-05-04 NOTE — PROGRESS NOTE ADULT - SUBJECTIVE AND OBJECTIVE BOX
Patient is a 69y old  Male who presents with a chief complaint of     SUBJECTIVE / OVERNIGHT EVENTS: c/o R knee pain.  Review of Systems  chest pain no  palpitations no  sob no  nausea no  headache no    MEDICATIONS  (STANDING):  aspirin enteric coated 81 milliGRAM(s) Oral daily  atorvastatin 40 milliGRAM(s) Oral at bedtime  heparin   Injectable 5000 Unit(s) SubCutaneous every 8 hours  hydrALAZINE 25 milliGRAM(s) Oral every 8 hours  sodium chloride 0.9%. 1000 milliLiter(s) (50 mL/Hr) IV Continuous <Continuous>    MEDICATIONS  (PRN):      Vital Signs Last 24 Hrs  T(C): 36.9 (04 May 2025 11:39), Max: 36.9 (04 May 2025 11:39)  T(F): 98.5 (04 May 2025 11:39), Max: 98.5 (04 May 2025 11:39)  HR: 89 (04 May 2025 11:39) (78 - 89)  BP: 156/82 (04 May 2025 11:39) (145/92 - 156/85)  BP(mean): 107 (04 May 2025 11:39) (107 - 107)  RR: 18 (04 May 2025 05:01) (18 - 18)  SpO2: 97% (04 May 2025 11:39) (97% - 98%)    Parameters below as of 04 May 2025 11:39  Patient On (Oxygen Delivery Method): room air        PHYSICAL EXAM:  GENERAL: NAD, well-developed  HEAD:  Atraumatic, Normocephalic  EYES: legally blind  NECK: Supple, No JVD  CHEST/LUNG: Clear to auscultation bilaterally; No wheeze  HEART: Regular rate and rhythm; No murmurs, rubs, or gallops  ABDOMEN: Soft, Nontender, Nondistended; Bowel sounds present  EXTREMITIES:  R knee tender, warm   PSYCH: AAOx2  NEUROLOGY: non-focal  SKIN: No rashes or lesions    LABS:                        11.8   10.12 )-----------( 402      ( 03 May 2025 01:01 )             37.3     05-04    138  |  103  |  33[H]  ----------------------------<  123[H]  4.5   |  23  |  1.66[H]    Ca    9.2      04 May 2025 07:05  Mg     2.5     05-03            Urinalysis Basic - ( 04 May 2025 07:05 )    Color: x / Appearance: x / SG: x / pH: x  Gluc: 123 mg/dL / Ketone: x  / Bili: x / Urobili: x   Blood: x / Protein: x / Nitrite: x   Leuk Esterase: x / RBC: x / WBC x   Sq Epi: x / Non Sq Epi: x / Bacteria: x          RADIOLOGY & ADDITIONAL TESTS:    Imaging Personally Reviewed:    Consultant(s) Notes Reviewed:      Care Discussed with Consultants/Other Providers:

## 2025-05-04 NOTE — PROGRESS NOTE ADULT - ASSESSMENT
69 m with     Cardiomyopathy  - hold Entresto, Aldactone  - Cardiology follow    HTN  - control    CKD  - follow Cr, lytes     R knee pain  - Xray knee  - Uric acid  - Rheumatology evaluation    DVT prophylaxis    d/w patient, ACP     Ar Abdi MD phone [phone]

## 2025-05-05 LAB
ADD ON TEST-SPECIMEN IN LAB: SIGNIFICANT CHANGE UP
ANION GAP SERPL CALC-SCNC: 14 MMOL/L — SIGNIFICANT CHANGE UP (ref 5–17)
BUN SERPL-MCNC: 35 MG/DL — HIGH (ref 7–23)
CALCIUM SERPL-MCNC: 9.3 MG/DL — SIGNIFICANT CHANGE UP (ref 8.4–10.5)
CHLORIDE SERPL-SCNC: 104 MMOL/L — SIGNIFICANT CHANGE UP (ref 96–108)
CO2 SERPL-SCNC: 20 MMOL/L — LOW (ref 22–31)
CREAT SERPL-MCNC: 1.6 MG/DL — HIGH (ref 0.5–1.3)
CRP SERPL-MCNC: 89 MG/L — HIGH (ref 0–4)
EGFR: 46 ML/MIN/1.73M2 — LOW
EGFR: 46 ML/MIN/1.73M2 — LOW
GLUCOSE SERPL-MCNC: 200 MG/DL — HIGH (ref 70–99)
MAGNESIUM SERPL-MCNC: 2.2 MG/DL — SIGNIFICANT CHANGE UP (ref 1.6–2.6)
PHOSPHATE SERPL-MCNC: 2.8 MG/DL — SIGNIFICANT CHANGE UP (ref 2.5–4.5)
POTASSIUM SERPL-MCNC: 4.8 MMOL/L — SIGNIFICANT CHANGE UP (ref 3.5–5.3)
POTASSIUM SERPL-SCNC: 4.8 MMOL/L — SIGNIFICANT CHANGE UP (ref 3.5–5.3)
SODIUM SERPL-SCNC: 138 MMOL/L — SIGNIFICANT CHANGE UP (ref 135–145)
URATE SERPL-MCNC: 8.4 MG/DL — SIGNIFICANT CHANGE UP (ref 3.4–8.8)

## 2025-05-05 PROCEDURE — 71045 X-RAY EXAM CHEST 1 VIEW: CPT | Mod: 26

## 2025-05-05 PROCEDURE — 99223 1ST HOSP IP/OBS HIGH 75: CPT | Mod: GC

## 2025-05-05 RX ORDER — COLCHICINE 0.6 MG/1
0.6 TABLET, FILM COATED ORAL DAILY
Refills: 0 | Status: DISCONTINUED | OUTPATIENT
Start: 2025-05-05 | End: 2025-05-06

## 2025-05-05 RX ADMIN — Medication 50 MILLIGRAM(S): at 22:35

## 2025-05-05 RX ADMIN — Medication 81 MILLIGRAM(S): at 11:44

## 2025-05-05 RX ADMIN — HEPARIN SODIUM 5000 UNIT(S): 1000 INJECTION INTRAVENOUS; SUBCUTANEOUS at 05:30

## 2025-05-05 RX ADMIN — SACUBITRIL AND VALSARTAN 1 TABLET(S): 6; 6 PELLET ORAL at 05:31

## 2025-05-05 RX ADMIN — SACUBITRIL AND VALSARTAN 1 TABLET(S): 6; 6 PELLET ORAL at 17:43

## 2025-05-05 RX ADMIN — ATORVASTATIN CALCIUM 40 MILLIGRAM(S): 80 TABLET, FILM COATED ORAL at 22:32

## 2025-05-05 RX ADMIN — METOPROLOL SUCCINATE 25 MILLIGRAM(S): 50 TABLET, EXTENDED RELEASE ORAL at 05:30

## 2025-05-05 RX ADMIN — Medication 25 MILLIGRAM(S): at 05:30

## 2025-05-05 RX ADMIN — HEPARIN SODIUM 5000 UNIT(S): 1000 INJECTION INTRAVENOUS; SUBCUTANEOUS at 22:35

## 2025-05-05 RX ADMIN — Medication 25 MILLIGRAM(S): at 16:06

## 2025-05-05 RX ADMIN — HEPARIN SODIUM 5000 UNIT(S): 1000 INJECTION INTRAVENOUS; SUBCUTANEOUS at 16:06

## 2025-05-05 NOTE — CONSULT NOTE ADULT - ASSESSMENT
69 yo M with PMHx of HTN, HLD, CKD3 CHF, cardiomyopathy LBBB, Glaucoma, cataracts presenting for HFrEF , positive stress test for possible left Heart cath and intervention    #Bilateral knee pain  -Risk factors: Heart failure, ?CANDICE on CKD, Aspirin  -Uric acid: 8.4    Recommendation:  -Xrays bilateral knees  -ESR, CRP  Note Incomplete. Please Wait     71 yo M with PMHx of HTN, HLD, CKD3 CHF, cardiomyopathy LBBB, Glaucoma, cataracts presenting for HFrEF , positive stress test for possible left Heart cath and intervention    #Polyarticular gout  -Risk factors: Heart failure, ?CANDICE on CKD, Aspirin  -Exam: bilateral knee, ankle warmth, effusion of left knee  -Uric acid: 8.4    Recommendation:  -Xrays bilateral knees, ankle and feet   -ESR, CRP    D/w       71 yo M with PMHx of HTN, HLD, CKD3 CHF, cardiomyopathy LBBB, Glaucoma, cataracts presenting for HFrEF , positive stress test for possible left Heart cath and intervention    #Polyarticular gout  Tophus+  -Risk factors: Heart failure, ?CANDICE on CKD3, Aspirin  -Exam: bilateral knee, ankle warmth, effusion of left knee, Bilateral ankle , left first MTP pain and warmth. Left leg ?cellulitis changes with skin breakage  -Uric acid: 8.4    Recommendation:  -Xrays bilateral knees, ankle and feet   -ESR, CRP, [id number] for ULT planning  -Start colchicine 0.6mg alternate days   -Evaluate and rule out cellulitis of left leg.   -If his pain is persistent on colchicine , if cellulitis is ruled out or treated, will consider steroids    D/w Dr. Pranav Brar MD, PGY-4  Rheumatology Fellow  Reachable on TEAMS       71 yo M with PMHx of HTN, HLD, CKD3 CHF, cardiomyopathy LBBB, Glaucoma, cataracts presenting for HFrEF , positive stress test for possible left Heart cath and intervention    #Polyarticular gout  Tophus+  -Risk factors: Heart failure, ?CANDICE on CKD3, Aspirin  -Exam: bilateral knee, ankle warmth, effusion of left knee, Bilateral ankle , left first MTP pain and warmth. Left leg ?cellulitis changes with skin breakage  -Uric acid: 8.4    Recommendation:  -Xrays bilateral knees, ankle and feet   -ESR, CRP, [id number] for ULT planning  -Start colchicine 0.6mg alternate days   -Evaluate and rule out cellulitis of left leg  -If his pain is persistent on colchicine , if cellulitis is ruled out or treated, will consider steroids    D/w Dr. Pranav Brar MD, PGY-4  Rheumatology Fellow  Reachable on TEAMS

## 2025-05-05 NOTE — PROGRESS NOTE ADULT - SUBJECTIVE AND OBJECTIVE BOX
INTERVAL HPI/OVERNIGHT EVENTS: paitent  comfortablem no dyspnea, lasix stopped has active gout    MEDICATIONS  (STANDING):  aspirin enteric coated 81 milliGRAM(s) Oral daily  atorvastatin 40 milliGRAM(s) Oral at bedtime  colchicine 0.6 milliGRAM(s) Oral daily  heparin   Injectable 5000 Unit(s) SubCutaneous every 8 hours  hydrALAZINE 50 milliGRAM(s) Oral every 8 hours  metoprolol succinate ER 25 milliGRAM(s) Oral daily  sacubitril 24 mG/valsartan 26 mG 1 Tablet(s) Oral two times a day  sodium chloride 0.9%. 1000 milliLiter(s) (50 mL/Hr) IV Continuous <Continuous>    MEDICATIONS  (PRN):      Allergies    No Known Allergies    Intolerances      ROS:  General: Pt denies recent weight loss/fever/chills    Neurological: denies numbness or  sensation loss    Cardiovascular: denies chest pain/palpitations/leg edema    Respiratory and Thorax: denies SOB/cough/wheezing    Gastrointestinal: denies abdominal pain/diarrhea/constipation/bloody stool    Genitourinary: denies urinary frequency/urgency/ dysuria    Musculoskeletal: denies joint pain or swelling, denies restricted motion    Hematologic: denies abnormal bleeding  	    	  	    		        	    	            Vital Signs Last 24 Hrs  T(C): 36.3 (05 May 2025 15:58), Max: 37.1 (05 May 2025 04:46)  T(F): 97.4 (05 May 2025 15:58), Max: 98.7 (05 May 2025 04:46)  HR: 87 (05 May 2025 15:58) (85 - 95)  BP: 161/96 (05 May 2025 15:58) (145/83 - 177/96)  BP(mean): 104 (05 May 2025 11:31) (104 - 104)  RR: 18 (05 May 2025 15:58) (18 - 18)  SpO2: 95% (05 May 2025 15:58) (95% - 99%)    Parameters below as of 05 May 2025 15:58  Patient On (Oxygen Delivery Method): room air      Daily     Daily Weight in k.9 (05 May 2025 04:46)    -04 @ 07:01  -  -05 @ 07:00  --------------------------------------------------------  IN: 0 mL / OUT: 600 mL / NET: -600 mL     @ 07:01  -   @ 20:53  --------------------------------------------------------  IN: 0 mL / OUT: 540 mL / NET: -540 mL      Physical Exam:    wn male  no JVD  cor RRR  lung clear  abd soft   ext no edema, knee pain      LABS:        138  |  104  |  35[H]  ----------------------------<  200[H]  4.8   |  20[L]  |  1.60[H]    Ca    9.3      05 May 2025 12:17  Phos  2.8       Mg     2.2             Urinalysis Basic - ( 05 May 2025 12:17 )    Color: x / Appearance: x / SG: x / pH: x  Gluc: 200 mg/dL / Ketone: x  / Bili: x / Urobili: x   Blood: x / Protein: x / Nitrite: x   Leuk Esterase: x / RBC: x / WBC x   Sq Epi: x / Non Sq Epi: x / Bacteria: x        RADIOLOGY & ADDITIONAL TESTS:    TELE:    EKG:

## 2025-05-05 NOTE — PROGRESS NOTE ADULT - ASSESSMENT
69 yr male with HTN DM HLD cardiomyopathy HFrEF chronic renal failure , creatinine 1.5-1.8, presents with acute pulmonary edema , borderline elevated troponin to [facility], + stress test, diuresed , cath today with nonobstructive disease. Vision poor and gait unsteady, lives with family. having bilateral knee pain c/w gout, entresot restarted metoprolol restarted    advise    1.  restart entresto 24/26  BID  2. restart metoprolol XL 25 mg daily  3. continue low dose   4. hydralazine increase to 50TID5  5 will restart spironolactone if creatinine stable

## 2025-05-05 NOTE — PROGRESS NOTE ADULT - ASSESSMENT
69 m with     Cardiomyopathy  - Entresto,   - hold Aldactone   - Cardiology follow    HTN  - control    CKD  - follow Cr, lytes     R knee pain / Gout  - Xray knee result pending   - Uric acid 8.4  - Colchicine   - Rheumatology evaluation    DVT prophylaxis    d/w patient, ACP      Ar Abdi MD phone [phone]

## 2025-05-05 NOTE — CONSULT NOTE ADULT - SUBJECTIVE AND OBJECTIVE BOX
***consult has been received. note is in progress and incomplete without attending attestation***        JOSE COTE  [id number]    HISTORY OF PRESENT ILLNESS:  71 yo M with PMHx of HTN, HLD, CKD3 CHF, cardiomyopathy LBBB, Glaucoma, cataracts presented to [facility] ED on 4/25 with c/o SOB associated with cough & congestion. pt hypertensive, tachypneic, tachycardic was placed on BIPAP, s/p Stress test 4/27/25 which reveals a small size, moderate in severity, fixed defect involving the mid inferior & apical inferior segment. There is a small size partially reversible defect involving the apical septal segment this is consistent with possible scar with per- infarct ischemia involving the LAD EF ~24% Seen & evaluated by cardiologist & now transferred to [facility] for [facility].    Card: ANASTASIA Phan  Pt's spouse reports that he did not take any medications at home   TTE 4/30/25 severely decrease LV function EF 20-25%, LVH, global LV hypokinesis without regional heterogeneity. Grade 3 diastolic dysfunction.       Rheum ROS    Denies fevers/chills/weight loss/night sweats/alopecia/sinus disease/asthma history/oral ulcers/dysphagia/vision changes/Raynauds/VTE/miscarraiges/sicca symptoms/urinary changes/edema/SOB/joint pain/swelling/jaw claudication/rash/photosensitivity/morning stiffness    Endorses fevers/chills/weight loss/night sweats/alopecia/sinus disease/asthma history/oral ulcers/dysphagia/vision changes/Raynauds/VTE/miscarraiges/sicca symptoms/urinary changes/edema/SOB/joint pain/swelling/jaw claudication/rash/photosensitivity/morning stiffness      MEDICATIONS  (STANDING):  aspirin enteric coated 81 milliGRAM(s) Oral daily  atorvastatin 40 milliGRAM(s) Oral at bedtime  heparin   Injectable 5000 Unit(s) SubCutaneous every 8 hours  hydrALAZINE 25 milliGRAM(s) Oral every 8 hours  metoprolol succinate ER 25 milliGRAM(s) Oral daily  sacubitril 24 mG/valsartan 26 mG 1 Tablet(s) Oral two times a day  sodium chloride 0.9%. 1000 milliLiter(s) (50 mL/Hr) IV Continuous <Continuous>    MEDICATIONS  (PRN):      Allergies    No Known Allergies    Intolerances        PERTINENT MEDICATION HISTORY:    SOCIAL HISTORY:  OCCUPATION:  TRAVEL HISTORY:    FAMILY HISTORY:  No pertinent family history        Vital Signs Last 24 Hrs  T(C): 36.9 (05 May 2025 11:31), Max: 37.1 (05 May 2025 04:46)  T(F): 98.5 (05 May 2025 11:31), Max: 98.7 (05 May 2025 04:46)  HR: 95 (05 May 2025 11:31) (85 - 95)  BP: 145/83 (05 May 2025 11:31) (145/83 - 177/96)  BP(mean): 104 (05 May 2025 11:31) (104 - 104)  RR: 18 (05 May 2025 11:31) (18 - 18)  SpO2: 97% (05 May 2025 11:31) (95% - 99%)    Parameters below as of 05 May 2025 11:31  Patient On (Oxygen Delivery Method): room air        Physical Exam:  General: No apparent distress  HEENT: EOMI, MMM  CVS: +S1/S2, RRR, no murmurs/rubs/gallops  Resp: CTA b/l. No crackles/wheezing  GI: Soft, NT/ND +BS  MSK: no swelling/warmth/erythema of the joints of the UE/LE  Neuro: AAOx3  Skin: no visible rashes    LABS:    05-05    138  |  104  |  35[H]  ----------------------------<  200[H]  4.8   |  20[L]  |  1.60[H]    Ca    9.3      05 May 2025 12:17  Phos  2.8     05-05  Mg     2.2     05-05        Urinalysis Basic - ( 05 May 2025 12:17 )    Color: x / Appearance: x / SG: x / pH: x  Gluc: 200 mg/dL / Ketone: x  / Bili: x / Urobili: x   Blood: x / Protein: x / Nitrite: x   Leuk Esterase: x / RBC: x / WBC x   Sq Epi: x / Non Sq Epi: x / Bacteria: x            Rheumatology Work Up    Creatine Kinase, Serum: 242 U/L [26 - 308] (08-29-17 @ 13:44)            RADIOLOGY & ADDITIONAL STUDIES:     ***consult has been received. note is in progress and incomplete without attending attestation***        JOSE COTE  [id number]    HISTORY OF PRESENT ILLNESS:  71 yo M with PMHx of HTN, HLD, CKD3 CHF, cardiomyopathy LBBB, Glaucoma, cataracts presented to [facility] ED on 4/25 with c/o SOB associated with cough & congestion. pt hypertensive, tachypneic, tachycardic was placed on BIPAP, s/p Stress test 4/27/25 which reveals a small size, moderate in severity, fixed defect involving the mid inferior & apical inferior segment. There is a small size partially reversible defect involving the apical septal segment this is consistent with possible scar with per- infarct ischemia involving the LAD EF ~24% Seen & evaluated by cardiologist & now transferred to [facility] for C.    Card: ANASTASIA Phan  Pt's spouse reports that he did not take any medications at home   TTE 4/30/25 severely decrease LV function EF 20-25%, LVH, global LV hypokinesis without regional heterogeneity. Grade 3 diastolic dysfunction.     Rheum hx:  Patient with h/o gout presenting for LHC,   Rheum ROS    Denies fevers/chills/weight loss/night sweats/alopecia/sinus disease/asthma history/oral ulcers/dysphagia/vision changes/Raynauds/VTE/miscarraiges/sicca symptoms/urinary changes/edema/SOB/joint pain/swelling/jaw claudication/rash/photosensitivity/morning stiffness    Endorses fevers/chills/weight loss/night sweats/alopecia/sinus disease/asthma history/oral ulcers/dysphagia/vision changes/Raynauds/VTE/miscarraiges/sicca symptoms/urinary changes/edema/SOB/joint pain/swelling/jaw claudication/rash/photosensitivity/morning stiffness      MEDICATIONS  (STANDING):  aspirin enteric coated 81 milliGRAM(s) Oral daily  atorvastatin 40 milliGRAM(s) Oral at bedtime  heparin   Injectable 5000 Unit(s) SubCutaneous every 8 hours  hydrALAZINE 25 milliGRAM(s) Oral every 8 hours  metoprolol succinate ER 25 milliGRAM(s) Oral daily  sacubitril 24 mG/valsartan 26 mG 1 Tablet(s) Oral two times a day  sodium chloride 0.9%. 1000 milliLiter(s) (50 mL/Hr) IV Continuous <Continuous>    MEDICATIONS  (PRN):      Allergies    No Known Allergies    Intolerances        PERTINENT MEDICATION HISTORY:    SOCIAL HISTORY:  OCCUPATION:  TRAVEL HISTORY:    FAMILY HISTORY:  No pertinent family history        Vital Signs Last 24 Hrs  T(C): 36.9 (05 May 2025 11:31), Max: 37.1 (05 May 2025 04:46)  T(F): 98.5 (05 May 2025 11:31), Max: 98.7 (05 May 2025 04:46)  HR: 95 (05 May 2025 11:31) (85 - 95)  BP: 145/83 (05 May 2025 11:31) (145/83 - 177/96)  BP(mean): 104 (05 May 2025 11:31) (104 - 104)  RR: 18 (05 May 2025 11:31) (18 - 18)  SpO2: 97% (05 May 2025 11:31) (95% - 99%)    Parameters below as of 05 May 2025 11:31  Patient On (Oxygen Delivery Method): room air        Physical Exam:  General: No apparent distress  HEENT: EOMI, MMM  CVS: +S1/S2, RRR, no murmurs/rubs/gallops  Resp: CTA b/l. No crackles/wheezing  GI: Soft, NT/ND +BS  MSK: no swelling/warmth/erythema of the joints of the UE/LE  Neuro: AAOx3  Skin: no visible rashes    LABS:    05-05    138  |  104  |  35[H]  ----------------------------<  200[H]  4.8   |  20[L]  |  1.60[H]    Ca    9.3      05 May 2025 12:17  Phos  2.8     05-05  Mg     2.2     05-05        Urinalysis Basic - ( 05 May 2025 12:17 )    Color: x / Appearance: x / SG: x / pH: x  Gluc: 200 mg/dL / Ketone: x  / Bili: x / Urobili: x   Blood: x / Protein: x / Nitrite: x   Leuk Esterase: x / RBC: x / WBC x   Sq Epi: x / Non Sq Epi: x / Bacteria: x            Rheumatology Work Up    Creatine Kinase, Serum: 242 U/L [26 - 308] (08-29-17 @ 13:44)            RADIOLOGY & ADDITIONAL STUDIES:     ***consult has been received. note is in progress and incomplete without attending attestation***        JOSE COTE  [id number]    HISTORY OF PRESENT ILLNESS:  71 yo M with PMHx of HTN, HLD, CKD3 CHF, cardiomyopathy LBBB, Glaucoma, cataracts presented to [facility] ED on 4/25 with c/o SOB associated with cough & congestion. pt hypertensive, tachypneic, tachycardic was placed on BIPAP, s/p Stress test 4/27/25 which reveals a small size, moderate in severity, fixed defect involving the mid inferior & apical inferior segment. There is a small size partially reversible defect involving the apical septal segment this is consistent with possible scar with per- infarct ischemia involving the LAD EF ~24% Seen & evaluated by cardiologist & now transferred to [facility] for LHC.    Card: ANASTASIA Phan  Pt's spouse reports that he did not take any medications at home   TTE 4/30/25 severely decrease LV function EF 20-25%, LVH, global LV hypokinesis without regional heterogeneity. Grade 3 diastolic dysfunction.     Rheum hx:  Patient with h/o gout presenting for LHC, c/o bilateral knee pain since 3 days.  Was ambulatory, until Thursday.   Knee pain is acute onset, as he started to feel better with SOB,  associated with swelling, no trauma, no fever, no rash    ROS+ bilateral ankle and feet pain.   ROS negative rash    MEDICATIONS  (STANDING):  aspirin enteric coated 81 milliGRAM(s) Oral daily  atorvastatin 40 milliGRAM(s) Oral at bedtime  heparin   Injectable 5000 Unit(s) SubCutaneous every 8 hours  hydrALAZINE 25 milliGRAM(s) Oral every 8 hours  metoprolol succinate ER 25 milliGRAM(s) Oral daily  sacubitril 24 mG/valsartan 26 mG 1 Tablet(s) Oral two times a day  sodium chloride 0.9%. 1000 milliLiter(s) (50 mL/Hr) IV Continuous <Continuous>    MEDICATIONS  (PRN):      Allergies    No Known Allergies    Intolerances      FAMILY HISTORY:  No pertinent family history        Vital Signs Last 24 Hrs  T(C): 36.9 (05 May 2025 11:31), Max: 37.1 (05 May 2025 04:46)  T(F): 98.5 (05 May 2025 11:31), Max: 98.7 (05 May 2025 04:46)  HR: 95 (05 May 2025 11:31) (85 - 95)  BP: 145/83 (05 May 2025 11:31) (145/83 - 177/96)  BP(mean): 104 (05 May 2025 11:31) (104 - 104)  RR: 18 (05 May 2025 11:31) (18 - 18)  SpO2: 97% (05 May 2025 11:31) (95% - 99%)    Parameters below as of 05 May 2025 11:31  Patient On (Oxygen Delivery Method): room air        Physical Exam:  General: No apparent distress  HEENT: EOMI, MMM  CVS: +S1/S2, RRR, no murmurs/rubs/gallops  Resp: CTA b/l. No crackles/wheezing  GI: Soft, NT/ND +BS  MSK: Bilateral knee warmth, right knee effusion, severe pain with ROM. Bilateral ankle tenderness, pain with movement, tenderness over the left dorsum of the foot  Neuro: AAOx3  Skin: no visible rashes    LABS:    05-05    138  |  104  |  35[H]  ----------------------------<  200[H]  4.8   |  20[L]  |  1.60[H]    Ca    9.3      05 May 2025 12:17  Phos  2.8     05-05  Mg     2.2     05-05        Urinalysis Basic - ( 05 May 2025 12:17 )    Color: x / Appearance: x / SG: x / pH: x  Gluc: 200 mg/dL / Ketone: x  / Bili: x / Urobili: x   Blood: x / Protein: x / Nitrite: x   Leuk Esterase: x / RBC: x / WBC x   Sq Epi: x / Non Sq Epi: x / Bacteria: x            Rheumatology Work Up    Creatine Kinase, Serum: 242 U/L [26 - 308] (08-29-17 @ 13:44)            RADIOLOGY & ADDITIONAL STUDIES:             JOSE COTE  [id number]    HISTORY OF PRESENT ILLNESS:  69 yo M with PMHx of HTN, HLD, CKD3 CHF, cardiomyopathy LBBB, Glaucoma, cataracts presented to [facility] ED on 4/25 with c/o SOB associated with cough & congestion. pt hypertensive, tachypneic, tachycardic was placed on BIPAP, s/p Stress test 4/27/25 which reveals a small size, moderate in severity, fixed defect involving the mid inferior & apical inferior segment. There is a small size partially reversible defect involving the apical septal segment this is consistent with possible scar with per- infarct ischemia involving the LAD EF ~24% Seen & evaluated by cardiologist & now transferred to [facility] for LHC.    Card: ANASTASIA Phan  Pt's spouse reports that he did not take any medications at home   TTE 4/30/25 severely decrease LV function EF 20-25%, LVH, global LV hypokinesis without regional heterogeneity. Grade 3 diastolic dysfunction.     Rheum hx:  Patient with h/o gout presenting for C, c/o bilateral knee pain since 3 days.  Was ambulatory, until Thursday.   Knee pain is acute onset, as he started to feel better with SOB,  associated with swelling, no trauma, no fever, no rash    ROS+ bilateral ankle and feet pain.   ROS negative rash, fever    MEDICATIONS  (STANDING):  aspirin enteric coated 81 milliGRAM(s) Oral daily  atorvastatin 40 milliGRAM(s) Oral at bedtime  heparin   Injectable 5000 Unit(s) SubCutaneous every 8 hours  hydrALAZINE 25 milliGRAM(s) Oral every 8 hours  metoprolol succinate ER 25 milliGRAM(s) Oral daily  sacubitril 24 mG/valsartan 26 mG 1 Tablet(s) Oral two times a day  sodium chloride 0.9%. 1000 milliLiter(s) (50 mL/Hr) IV Continuous <Continuous>    MEDICATIONS  (PRN):      Allergies    No Known Allergies    Intolerances      FAMILY HISTORY:  No pertinent family history        Vital Signs Last 24 Hrs  T(C): 36.9 (05 May 2025 11:31), Max: 37.1 (05 May 2025 04:46)  T(F): 98.5 (05 May 2025 11:31), Max: 98.7 (05 May 2025 04:46)  HR: 95 (05 May 2025 11:31) (85 - 95)  BP: 145/83 (05 May 2025 11:31) (145/83 - 177/96)  BP(mean): 104 (05 May 2025 11:31) (104 - 104)  RR: 18 (05 May 2025 11:31) (18 - 18)  SpO2: 97% (05 May 2025 11:31) (95% - 99%)    Parameters below as of 05 May 2025 11:31  Patient On (Oxygen Delivery Method): room air        Physical Exam:  General: No apparent distress  HEENT: EOMI, MMM  CVS: +S1/S2, RRR, no murmurs/rubs/gallops  Resp: CTA b/l. No crackles/wheezing  GI: Soft, NT/ND +BS  MSK: Bilateral knee warmth, right knee effusion, severe pain with ROM. Bilateral ankle tenderness, pain with movement, tenderness over the left dorsum of the foot, left shin. Erythema and skin breakage with warmth noted on left shin with local tenderness ? cellulitis  Neuro: AAOx3  Skin: no visible rashes    LABS:    05-05    138  |  104  |  35[H]  ----------------------------<  200[H]  4.8   |  20[L]  |  1.60[H]    Ca    9.3      05 May 2025 12:17  Phos  2.8     05-05  Mg     2.2     05-05        Urinalysis Basic - ( 05 May 2025 12:17 )    Color: x / Appearance: x / SG: x / pH: x  Gluc: 200 mg/dL / Ketone: x  / Bili: x / Urobili: x   Blood: x / Protein: x / Nitrite: x   Leuk Esterase: x / RBC: x / WBC x   Sq Epi: x / Non Sq Epi: x / Bacteria: x            Rheumatology Work Up    Creatine Kinase, Serum: 242 U/L [26 - 308] (08-29-17 @ 13:44)            RADIOLOGY & ADDITIONAL STUDIES:

## 2025-05-05 NOTE — PROVIDER CONTACT NOTE (OTHER) - ASSESSMENT
Pt a&o x4. room air   legally blind     VS   bp: 145/83  temp 98.5  HR 95  O2 97     Pt denies chest pain, SOB, palpations

## 2025-05-05 NOTE — PROGRESS NOTE ADULT - SUBJECTIVE AND OBJECTIVE BOX
Patient is a 69y old  Male who presents with a chief complaint of     SUBJECTIVE / OVERNIGHT EVENTS: c/o R knee pain   Review of Systems  chest pain no  palpitations no  sob no  nausea no  headache no    MEDICATIONS  (STANDING):  aspirin enteric coated 81 milliGRAM(s) Oral daily  atorvastatin 40 milliGRAM(s) Oral at bedtime  heparin   Injectable 5000 Unit(s) SubCutaneous every 8 hours  hydrALAZINE 25 milliGRAM(s) Oral every 8 hours  metoprolol succinate ER 25 milliGRAM(s) Oral daily  sacubitril 24 mG/valsartan 26 mG 1 Tablet(s) Oral two times a day  sodium chloride 0.9%. 1000 milliLiter(s) (50 mL/Hr) IV Continuous <Continuous>    MEDICATIONS  (PRN):      Vital Signs Last 24 Hrs  T(C): 36.3 (05 May 2025 15:58), Max: 37.1 (05 May 2025 04:46)  T(F): 97.4 (05 May 2025 15:58), Max: 98.7 (05 May 2025 04:46)  HR: 87 (05 May 2025 15:58) (85 - 95)  BP: 161/96 (05 May 2025 15:58) (145/83 - 177/96)  BP(mean): 104 (05 May 2025 11:31) (104 - 104)  RR: 18 (05 May 2025 15:58) (18 - 18)  SpO2: 95% (05 May 2025 15:58) (95% - 99%)    Parameters below as of 05 May 2025 15:58  Patient On (Oxygen Delivery Method): room air        PHYSICAL EXAM:  GENERAL: NAD, well-developed  HEAD:  Atraumatic, Normocephalic  EYES: EOMI, legally blind  NECK: Supple, No JVD  CHEST/LUNG: Clear to auscultation bilaterally; No wheeze  HEART: Regular rate and rhythm; No murmurs, rubs, or gallops  ABDOMEN: Soft, Nontender, Nondistended; Bowel sounds present  EXTREMITIES:  R knee tender + effusion   PSYCH: AAOx3  NEUROLOGY: non-focal  SKIN: No rashes or lesions    LABS:    05-05    138  |  104  |  35[H]  ----------------------------<  200[H]  4.8   |  20[L]  |  1.60[H]    Ca    9.3      05 May 2025 12:17  Phos  2.8     05-05  Mg     2.2     05-05            Urinalysis Basic - ( 05 May 2025 12:17 )    Color: x / Appearance: x / SG: x / pH: x  Gluc: 200 mg/dL / Ketone: x  / Bili: x / Urobili: x   Blood: x / Protein: x / Nitrite: x   Leuk Esterase: x / RBC: x / WBC x   Sq Epi: x / Non Sq Epi: x / Bacteria: x          RADIOLOGY & ADDITIONAL TESTS:    Imaging Personally Reviewed:    Consultant(s) Notes Reviewed:      Care Discussed with Consultants/Other Providers:

## 2025-05-06 LAB
ADD ON TEST-SPECIMEN IN LAB: SIGNIFICANT CHANGE UP
ANION GAP SERPL CALC-SCNC: 14 MMOL/L — SIGNIFICANT CHANGE UP (ref 5–17)
BUN SERPL-MCNC: 33 MG/DL — HIGH (ref 7–23)
CALCIUM SERPL-MCNC: 9.4 MG/DL — SIGNIFICANT CHANGE UP (ref 8.4–10.5)
CHLORIDE SERPL-SCNC: 105 MMOL/L — SIGNIFICANT CHANGE UP (ref 96–108)
CO2 SERPL-SCNC: 20 MMOL/L — LOW (ref 22–31)
CREAT SERPL-MCNC: 1.54 MG/DL — HIGH (ref 0.5–1.3)
EGFR: 48 ML/MIN/1.73M2 — LOW
EGFR: 48 ML/MIN/1.73M2 — LOW
ERYTHROCYTE [SEDIMENTATION RATE] IN BLOOD: 120 MM/HR — HIGH (ref 0–20)
GLUCOSE SERPL-MCNC: 103 MG/DL — HIGH (ref 70–99)
HCT VFR BLD CALC: 39.2 % — SIGNIFICANT CHANGE UP (ref 39–50)
HGB BLD-MCNC: 12.3 G/DL — LOW (ref 13–17)
MCHC RBC-ENTMCNC: 22.4 PG — LOW (ref 27–34)
MCHC RBC-ENTMCNC: 31.4 G/DL — LOW (ref 32–36)
MCV RBC AUTO: 71.4 FL — LOW (ref 80–100)
NRBC BLD AUTO-RTO: 0 /100 WBCS — SIGNIFICANT CHANGE UP (ref 0–0)
NT-PROBNP SERPL-SCNC: 1517 PG/ML — HIGH (ref 0–300)
PLATELET # BLD AUTO: 527 K/UL — HIGH (ref 150–400)
POTASSIUM SERPL-MCNC: 4.8 MMOL/L — SIGNIFICANT CHANGE UP (ref 3.5–5.3)
POTASSIUM SERPL-SCNC: 4.8 MMOL/L — SIGNIFICANT CHANGE UP (ref 3.5–5.3)
RBC # BLD: 5.49 M/UL — SIGNIFICANT CHANGE UP (ref 4.2–5.8)
RBC # FLD: 18 % — HIGH (ref 10.3–14.5)
SODIUM SERPL-SCNC: 139 MMOL/L — SIGNIFICANT CHANGE UP (ref 135–145)
WBC # BLD: 11.43 K/UL — HIGH (ref 3.8–10.5)
WBC # FLD AUTO: 11.43 K/UL — HIGH (ref 3.8–10.5)

## 2025-05-06 PROCEDURE — 99232 SBSQ HOSP IP/OBS MODERATE 35: CPT | Mod: GC

## 2025-05-06 PROCEDURE — 99222 1ST HOSP IP/OBS MODERATE 55: CPT

## 2025-05-06 RX ORDER — METHYLPREDNISOLONE ACETATE 80 MG/ML
40 INJECTION, SUSPENSION INTRA-ARTICULAR; INTRALESIONAL; INTRAMUSCULAR; SOFT TISSUE DAILY
Refills: 0 | Status: COMPLETED | OUTPATIENT
Start: 2025-05-07 | End: 2025-05-08

## 2025-05-06 RX ORDER — COLCHICINE 0.6 MG/1
0.6 TABLET, FILM COATED ORAL EVERY OTHER DAY
Refills: 0 | Status: DISCONTINUED | OUTPATIENT
Start: 2025-05-06 | End: 2025-05-15

## 2025-05-06 RX ORDER — ACETAMINOPHEN 500 MG/5ML
650 LIQUID (ML) ORAL EVERY 6 HOURS
Refills: 0 | Status: DISCONTINUED | OUTPATIENT
Start: 2025-05-06 | End: 2025-05-07

## 2025-05-06 RX ORDER — METHYLPREDNISOLONE ACETATE 80 MG/ML
40 INJECTION, SUSPENSION INTRA-ARTICULAR; INTRALESIONAL; INTRAMUSCULAR; SOFT TISSUE ONCE
Refills: 0 | Status: COMPLETED | OUTPATIENT
Start: 2025-05-06 | End: 2025-05-06

## 2025-05-06 RX ADMIN — Medication 81 MILLIGRAM(S): at 11:10

## 2025-05-06 RX ADMIN — HEPARIN SODIUM 5000 UNIT(S): 1000 INJECTION INTRAVENOUS; SUBCUTANEOUS at 05:26

## 2025-05-06 RX ADMIN — Medication 650 MILLIGRAM(S): at 13:56

## 2025-05-06 RX ADMIN — METOPROLOL SUCCINATE 25 MILLIGRAM(S): 50 TABLET, EXTENDED RELEASE ORAL at 05:25

## 2025-05-06 RX ADMIN — HEPARIN SODIUM 5000 UNIT(S): 1000 INJECTION INTRAVENOUS; SUBCUTANEOUS at 13:57

## 2025-05-06 RX ADMIN — Medication 50 MILLIGRAM(S): at 13:56

## 2025-05-06 RX ADMIN — METHYLPREDNISOLONE ACETATE 40 MILLIGRAM(S): 80 INJECTION, SUSPENSION INTRA-ARTICULAR; INTRALESIONAL; INTRAMUSCULAR; SOFT TISSUE at 18:22

## 2025-05-06 RX ADMIN — Medication 50 MILLIGRAM(S): at 05:26

## 2025-05-06 RX ADMIN — Medication 50 MILLIGRAM(S): at 21:36

## 2025-05-06 RX ADMIN — SACUBITRIL AND VALSARTAN 1 TABLET(S): 6; 6 PELLET ORAL at 05:25

## 2025-05-06 RX ADMIN — ATORVASTATIN CALCIUM 40 MILLIGRAM(S): 80 TABLET, FILM COATED ORAL at 21:36

## 2025-05-06 RX ADMIN — SACUBITRIL AND VALSARTAN 1 TABLET(S): 6; 6 PELLET ORAL at 18:10

## 2025-05-06 RX ADMIN — HEPARIN SODIUM 5000 UNIT(S): 1000 INJECTION INTRAVENOUS; SUBCUTANEOUS at 21:36

## 2025-05-06 RX ADMIN — COLCHICINE 0.6 MILLIGRAM(S): 0.6 TABLET, FILM COATED ORAL at 11:10

## 2025-05-06 NOTE — PROGRESS NOTE ADULT - SUBJECTIVE AND OBJECTIVE BOX
INTERVAL HPI/OVERNIGHT EVENTS: patient feels well, no chest pain , no SOB    MEDICATIONS  (STANDING):  aspirin enteric coated 81 milliGRAM(s) Oral daily  atorvastatin 40 milliGRAM(s) Oral at bedtime  colchicine 0.6 milliGRAM(s) Oral every other day  heparin   Injectable 5000 Unit(s) SubCutaneous every 8 hours  hydrALAZINE 50 milliGRAM(s) Oral every 8 hours  metoprolol succinate ER 25 milliGRAM(s) Oral daily  sacubitril 24 mG/valsartan 26 mG 1 Tablet(s) Oral two times a day  sodium chloride 0.9%. 1000 milliLiter(s) (50 mL/Hr) IV Continuous <Continuous>    MEDICATIONS  (PRN):  acetaminophen     Tablet .. 650 milliGRAM(s) Oral every 6 hours PRN Mild Pain (1 - 3)      Allergies    No Known Allergies    Intolerances      ROS:  General: Pt denies recent weight loss/fever/chills    Neurological: denies numbness or  sensation loss    Cardiovascular: denies chest pain/palpitations/leg edema    Respiratory and Thorax: denies SOB/cough/wheezing    Gastrointestinal: denies abdominal pain/diarrhea/constipation/bloody stool    Genitourinary: denies urinary frequency/urgency/ dysuria    Musculoskeletal: denies joint pain or swelling, denies restricted motion    Hematologic: denies abnormal bleeding  	    	  	    		        	    	            Vital Signs Last 24 Hrs  T(C): 36.6 (06 May 2025 19:18), Max: 36.7 (06 May 2025 16:15)  T(F): 97.9 (06 May 2025 19:18), Max: 98 (06 May 2025 16:15)  HR: 84 (06 May 2025 19:18) (80 - 90)  BP: 119/72 (06 May 2025 19:18) (119/72 - 147/81)  BP(mean): --  RR: 18 (06 May 2025 19:18) (18 - 18)  SpO2: 100% (06 May 2025 19:18) (95% - 100%)    Parameters below as of 06 May 2025 19:18  Patient On (Oxygen Delivery Method): room air      Daily     Daily Weight in k.9 (06 May 2025 04:06)    05-05 @ 07:01  -  05- @ 07:00  --------------------------------------------------------  IN: 0 mL / OUT: 840 mL / NET: -840 mL    05- @ 07:01  -  05-06 @ 21:33  --------------------------------------------------------  IN: 520 mL / OUT: 220 mL / NET: 300 mL      Physical Exam:    wdwn male  no JVD  cor RRR  lung clear   abd soft   ext no edema      LABS:                        12.3   11.43 )-----------( 527      ( 06 May 2025 06:58 )             39.2     05-06    139  |  105  |  33[H]  ----------------------------<  103[H]  4.8   |  20[L]  |  1.54[H]    Ca    9.4      06 May 2025 07:01  Phos  2.8     05-05  Mg     2.2     05-05        Urinalysis Basic - ( 06 May 2025 07:01 )    Color: x / Appearance: x / SG: x / pH: x  Gluc: 103 mg/dL / Ketone: x  / Bili: x / Urobili: x   Blood: x / Protein: x / Nitrite: x   Leuk Esterase: x / RBC: x / WBC x   Sq Epi: x / Non Sq Epi: x / Bacteria: x        RADIOLOGY & ADDITIONAL TESTS:    TELE:    EKG:

## 2025-05-06 NOTE — PROGRESS NOTE ADULT - ASSESSMENT
69 yr male with HTN DM HLD cardiomyopathy HFrEF chronic renal failure , creatinine 1.5-1.8, LBBB, medical noncompliance presents with acute pulmonary edema , borderline elevated troponin to [place]a, + stress test, diuresed , cath today with nonobstructive disease. Vision poor and gait unsteady, lives with family. having bilateral knee pain c/w gout, entresto  restarted metoprolol restarted    advise    1.  continue entresto 24/26  BID  2. continue metoprolol XL 25 mg daily  3. continue. hydralazine 50 TID   4. K 4,8 , if Bp elevated , can increase hydralazine to 100 TiD  5. diuretics on hold - will continue to hold spironolactone, lasix, with active gout and no dyspnea, lying flat comfortably  6. will continue to followup, if LVEF remains <35% despite medical compliance will consider for CRT ICD in 3 months

## 2025-05-06 NOTE — CONSULT NOTE ADULT - ASSESSMENT
69 yo M with PMHx of HTN, HLD, CKD3 CHF, cardiomyopathy LBBB, Glaucoma, cataracts presented to [facility] ED on 4/25/ 2025  with c/o SOB associated with cough & congestion.   TTE 4/30/25 severely decrease LV function EF 20-25%, LVH, global LV hypokinesis without regional heterogeneity. Grade 3 diastolic dysfunction.   5/2 cardiac cath demonstrated mild non obstructive CAD.    Presentation does not indicate LE cellulitis  He has venous stasis changes, local ecchymoses    Suggest  NO OBJECTION TO STEROIDS  please draw Quanfiferon gold tb prior to steroid therapy  topical moisturizing cream, elevation to LEs    discussed with primary medical attending

## 2025-05-06 NOTE — PROGRESS NOTE ADULT - SUBJECTIVE AND OBJECTIVE BOX
Patient is a 69y old  Male who presents with a chief complaint of cath (06 May 2025 11:20)      SUBJECTIVE / OVERNIGHT EVENTS: improving R knee pain.   Review of Systems  chest pain no  palpitations no  sob no  nausea no  headache no    MEDICATIONS  (STANDING):  aspirin enteric coated 81 milliGRAM(s) Oral daily  atorvastatin 40 milliGRAM(s) Oral at bedtime  colchicine 0.6 milliGRAM(s) Oral every other day  heparin   Injectable 5000 Unit(s) SubCutaneous every 8 hours  hydrALAZINE 50 milliGRAM(s) Oral every 8 hours  metoprolol succinate ER 25 milliGRAM(s) Oral daily  sacubitril 24 mG/valsartan 26 mG 1 Tablet(s) Oral two times a day  sodium chloride 0.9%. 1000 milliLiter(s) (50 mL/Hr) IV Continuous <Continuous>    MEDICATIONS  (PRN):  acetaminophen     Tablet .. 650 milliGRAM(s) Oral every 6 hours PRN Mild Pain (1 - 3)      Vital Signs Last 24 Hrs  T(C): 36.6 (06 May 2025 11:57), Max: 37.1 (05 May 2025 21:09)  T(F): 97.8 (06 May 2025 11:57), Max: 98.7 (05 May 2025 21:09)  HR: 90 (06 May 2025 12:44) (80 - 90)  BP: 137/79 (06 May 2025 12:44) (130/84 - 161/96)  BP(mean): --  RR: 18 (06 May 2025 12:44) (18 - 18)  SpO2: 97% (06 May 2025 12:44) (95% - 98%)    Parameters below as of 06 May 2025 12:44  Patient On (Oxygen Delivery Method): room air        PHYSICAL EXAM:  GENERAL: NAD, well-developed  HEAD:  Atraumatic, Normocephalic  EYES: legaly blind   NECK: Supple, No JVD  CHEST/LUNG: Clear to auscultation bilaterally; No wheeze  HEART: Regular rate and rhythm; No murmurs, rubs, or gallops  ABDOMEN: Soft, Nontender, Nondistended; Bowel sounds present  EXTREMITIES:  R knee tender + effusion no edema  PSYCH: AAOx3  NEUROLOGY: non-focal  SKIN: No rashes or lesions    LABS:                        12.3   11.43 )-----------( 527      ( 06 May 2025 06:58 )             39.2     05-06    139  |  105  |  33[H]  ----------------------------<  103[H]  4.8   |  20[L]  |  1.54[H]    Ca    9.4      06 May 2025 07:01  Phos  2.8     05-05  Mg     2.2     05-05            Urinalysis Basic - ( 06 May 2025 07:01 )    Color: x / Appearance: x / SG: x / pH: x  Gluc: 103 mg/dL / Ketone: x  / Bili: x / Urobili: x   Blood: x / Protein: x / Nitrite: x   Leuk Esterase: x / RBC: x / WBC x   Sq Epi: x / Non Sq Epi: x / Bacteria: x          RADIOLOGY & ADDITIONAL TESTS:    Imaging Personally Reviewed:  < from: Xray Knee 1 or 2 Views, Right (05.04.25 @ 17:06) >  IMPRESSION:  Knee joint effusion.    No acute fractures or dislocations.    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    Conspicuous protuberant superior patellar enthesophyte.    No discrete lytic or blastic lesions.    < end of copied text >    Consultant(s) Notes Reviewed:      Care Discussed with Consultants/Other Providers:

## 2025-05-06 NOTE — PROGRESS NOTE ADULT - SUBJECTIVE AND OBJECTIVE BOX
JOSE COTE  [id number]    INTERVAL HPI/OVERNIGHT EVENTS:  Persistent joint pains+  No fever, no white count  No cellulitis per infectious disease. No contraindications for steroids      MEDICATIONS  (STANDING):  aspirin enteric coated 81 milliGRAM(s) Oral daily  atorvastatin 40 milliGRAM(s) Oral at bedtime  colchicine 0.6 milliGRAM(s) Oral every other day  heparin   Injectable 5000 Unit(s) SubCutaneous every 8 hours  hydrALAZINE 50 milliGRAM(s) Oral every 8 hours  metoprolol succinate ER 25 milliGRAM(s) Oral daily  sacubitril 24 mG/valsartan 26 mG 1 Tablet(s) Oral two times a day  sodium chloride 0.9%. 1000 milliLiter(s) (50 mL/Hr) IV Continuous <Continuous>    MEDICATIONS  (PRN):  acetaminophen     Tablet .. 650 milliGRAM(s) Oral every 6 hours PRN Mild Pain (1 - 3)      Allergies    No Known Allergies    Intolerances        Review of Systems:   General: No fevers/chills, no fatigue  HEENT: No blurry vision, dysphagia, or odynophagia  CVS: No CP/palpitations  Resp: No SOB/wheezing  GI: No N/V/C/D/abdominal pain  MSK:   Skin: No new rashes  Neuro: No headaches      Vital Signs Last 24 Hrs  T(C): 36.6 (06 May 2025 11:57), Max: 37.1 (05 May 2025 21:09)  T(F): 97.8 (06 May 2025 11:57), Max: 98.7 (05 May 2025 21:09)  HR: 90 (06 May 2025 12:44) (80 - 90)  BP: 137/79 (06 May 2025 12:44) (130/84 - 161/96)  BP(mean): --  RR: 18 (06 May 2025 12:44) (18 - 18)  SpO2: 97% (06 May 2025 12:44) (95% - 98%)    Parameters below as of 06 May 2025 12:44  Patient On (Oxygen Delivery Method): room air        Physical Exam:  General: NAD  HEENT: EOMI, MMM  Cardio: +S1/S2, RRR  Resp: CTA b/l  GI: +BS, soft, NT/ND  MSK:  Neuro: AAOx3  Psych: wnl    LABS:                        12.3   11.43 )-----------( 527      ( 06 May 2025 06:58 )             39.2     05-06    139  |  105  |  33[H]  ----------------------------<  103[H]  4.8   |  20[L]  |  1.54[H]    Ca    9.4      06 May 2025 07:01  Phos  2.8     05-05  Mg     2.2     05-05        Urinalysis Basic - ( 06 May 2025 07:01 )    Color: x / Appearance: x / SG: x / pH: x  Gluc: 103 mg/dL / Ketone: x  / Bili: x / Urobili: x   Blood: x / Protein: x / Nitrite: x   Leuk Esterase: x / RBC: x / WBC x   Sq Epi: x / Non Sq Epi: x / Bacteria: x          RADIOLOGY & ADDITIONAL TESTS:   JOSE COTE  [id number]    INTERVAL HPI/OVERNIGHT EVENTS:  Persistent joint pains+  No fever, no white count  No cellulitis per infectious disease. No contraindications for steroids      MEDICATIONS  (STANDING):  aspirin enteric coated 81 milliGRAM(s) Oral daily  atorvastatin 40 milliGRAM(s) Oral at bedtime  colchicine 0.6 milliGRAM(s) Oral every other day  heparin   Injectable 5000 Unit(s) SubCutaneous every 8 hours  hydrALAZINE 50 milliGRAM(s) Oral every 8 hours  metoprolol succinate ER 25 milliGRAM(s) Oral daily  sacubitril 24 mG/valsartan 26 mG 1 Tablet(s) Oral two times a day  sodium chloride 0.9%. 1000 milliLiter(s) (50 mL/Hr) IV Continuous <Continuous>    MEDICATIONS  (PRN):  acetaminophen     Tablet .. 650 milliGRAM(s) Oral every 6 hours PRN Mild Pain (1 - 3)      Allergies    No Known Allergies    Intolerances      Vital Signs Last 24 Hrs  T(C): 36.6 (06 May 2025 11:57), Max: 37.1 (05 May 2025 21:09)  T(F): 97.8 (06 May 2025 11:57), Max: 98.7 (05 May 2025 21:09)  HR: 90 (06 May 2025 12:44) (80 - 90)  BP: 137/79 (06 May 2025 12:44) (130/84 - 161/96)  BP(mean): --  RR: 18 (06 May 2025 12:44) (18 - 18)  SpO2: 97% (06 May 2025 12:44) (95% - 98%)    Parameters below as of 06 May 2025 12:44  Patient On (Oxygen Delivery Method): room air        Physical Exam:  General: NAD  HEENT: EOMI, MMM  Cardio: +S1/S2, RRR  Resp: CTA b/l  GI: +BS, soft, NT/ND  MSK: bilateral knee, ankle warmth, effusion of left knee, Bilateral ankle , left first MTP pain and warmth.  Neuro: AAOx3  Psych: wnl    LABS:                        12.3   11.43 )-----------( 527      ( 06 May 2025 06:58 )             39.2     05-06    139  |  105  |  33[H]  ----------------------------<  103[H]  4.8   |  20[L]  |  1.54[H]    Ca    9.4      06 May 2025 07:01  Phos  2.8     05-05  Mg     2.2     05-05        Urinalysis Basic - ( 06 May 2025 07:01 )    Color: x / Appearance: x / SG: x / pH: x  Gluc: 103 mg/dL / Ketone: x  / Bili: x / Urobili: x   Blood: x / Protein: x / Nitrite: x   Leuk Esterase: x / RBC: x / WBC x   Sq Epi: x / Non Sq Epi: x / Bacteria: x          RADIOLOGY & ADDITIONAL TESTS:

## 2025-05-06 NOTE — PROGRESS NOTE ADULT - ASSESSMENT
69 yo M with PMHx of HTN, HLD, CKD3 CHF, cardiomyopathy LBBB, Glaucoma, cataracts presenting for HFrEF , positive stress test for possible left Heart cath and intervention    #Polyarticular gout  Tophus+  -Risk factors: Heart failure, ?CANDICE on CKD3, Aspirin  -Exam: bilateral knee, ankle warmth, effusion of left knee, Bilateral ankle , left first MTP pain and warmth. Left leg ?cellulitis changes with skin breakage  -Uric acid: 8.4    Recommendation:  -Xrays bilateral knees, ankle and feet   -ESR, CRP, [id number] for ULT planning  -Start colchicine 0.6mg alternate days   -Evaluate and rule out cellulitis of left leg  -If his pain is persistent on colchicine , if cellulitis is ruled out or treated, will consider steroids    D/w Dr. Pranav Brar MD, PGY-4  Rheumatology Fellow  Reachable on TEAMS       69 yo M with PMHx of HTN, HLD, CKD3 CHF, cardiomyopathy LBBB, Glaucoma, cataracts presenting for HFrEF , positive stress test for possible left Heart cath and intervention    #Polyarticular gout  Tophus+  -Risk factors: Heart failure, ?CANDICE on CKD3, Aspirin  -Exam: bilateral knee, ankle warmth, effusion of left knee, Bilateral ankle , left first MTP pain and warmth.  -Uric acid: 8.4  -Xrays: OA    Recommendation:  -Xrays bILATERAL, ankle and feet   -Please send [id number] for ULT planning  -c/w colchicine 0.6mg alternate days   -Will start solumedrol 20mg stat one dose now and  daily from tomorrow AM for 2 days    D/w Dr. Pranav Brar MD, PGY-4  Rheumatology Fellow  Reachable on TEAMS       69 yo M with PMHx of HTN, HLD, CKD3 CHF, cardiomyopathy LBBB, Glaucoma, cataracts presenting for HFrEF , positive stress test for possible left Heart cath and intervention    #Polyarticular gout  Tophus+  -Risk factors: Heart failure, ?CANDICE on CKD3, Aspirin  -Exam: bilateral knee, ankle warmth, effusion of left knee, Bilateral ankle , left first MTP pain and warmth.  -Uric acid: 8.4  -Xrays: OA of knees    Recommendation:  -Xrays bILATERAL, ankle and feet   -Please send [id number] for ULT planning  -c/w colchicine 0.6mg alternate days   -Will start solumedrol 40mg stat one dose now and  daily from tomorrow AM for 2 days    D/w Dr. Pranav Brar MD, PGY-4  Rheumatology Fellow  Reachable on TEAMS

## 2025-05-06 NOTE — PROGRESS NOTE ADULT - ASSESSMENT
69 m with     Cardiomyopathy  - Entresto,   - hold Aldactone   - Cardiology follow    HTN  - control    CKD  - follow Cr, lytes     R knee pain / Gout  - Xray knee as above  - Uric acid 8.4  - Colchicine every other day  - Steroid  - Rheumatology evaluation    Cellulitis r/o  - ID evaluation noted  - QuantiFeron Gold TB    DVT prophylaxis    d/w patient, consultant, ACP      Ar Abdi MD phone [phone]

## 2025-05-06 NOTE — CONSULT NOTE ADULT - SUBJECTIVE AND OBJECTIVE BOX
Patient is a 69y old  Male who presents with a chief complaint of dyspnea (05 May 2025 20:53)    HPI:   71 yo M with PMHx of HTN, HLD, CKD3 CHF, cardiomyopathy LBBB, Glaucoma, cataracts presented to [facility] ED on 4/25/ 2025  with c/o SOB associated with cough & congestion. pt hypertensive, tachypneic, tachycardic was placed on BIPAP,   Stress testing  4/27/25 which revealed a small size, moderate in severity, fixed defect involving the mid inferior & apical inferior segment. There is a small size partially reversible defect involving the apical septal segment this is consistent with possible scar with per- infarct ischemia involving the LAD EF ~24%   Seen & evaluated by cardiologist & transferred on 5/2/25 to [facility] for LHC.    Pt's spouse reports that he did not take any medications at home   TTE 4/30/25 severely decrease LV function EF 20-25%, LVH, global LV hypokinesis without regional heterogeneity. Grade 3 diastolic dysfunction.     5/2/25: Procedures:               1.    Arterial Access - Right Radial 2.    Diagnostic Coronary Angiography; Indications:                Congestive heart failure, acute on chronic  systolic  Diagnostic Conclusions: Mild nonobstructive CAD.  Mediacl therapy     Diagnostic Findings:   Coronary Angiography   The coronary circulation is right dominant.    LM   Left main artery: Angiography shows no disease.    LAD   Left anterior descending artery: Angiography shows mild  atherosclerosis.  CX   Circumflex: Angiography shows mild atherosclerosis.    RCA   Right coronary artery: Angiography shows mild atherosclerosis.      PT developed acute LE swelling, bilateral knee pain - impression was oligoarticular gout  There was concern for LLE cellulitis    At present, pt is sleepy  No antibiotics have been administered this hospitalization    PAST MEDICAL & SURGICAL HISTORY:  Hypertension  Gout  HLD (hyperlipidemia)  CKD (chronic kidney disease) stage 3, GFR 30-59 ml/min  Glaucoma  Cataract    Social history:  .  former Et[employer] use    FAMILY HISTORY:  No pertinent family history    REVIEW OF SYSTEMS:  limited by his sleepiness, does not offer complaints  CONSTITUTIONAL: No weakness, fevers or chills  EYES/ENT: No visual changes;  No vertigo or throat pain   NECK: No pain or stiffness  RESPIRATORY: No cough, wheezing, hemoptysis; No shortness of breath  CARDIOVASCULAR: No chest pain or palpitations  GASTROINTESTINAL: No abdominal or epigastric pain. No nausea, vomiting, or hematemesis; No diarrhea or constipation. No melena or hematochezia.  GENITOURINARY: No dysuria, frequency or hematuria  NEUROLOGICAL: No numbness or weakness  SKIN: No itching, burning, rashes, or lesions   All other review of systems is negative unless indicated above    Allergies  No Known Allergies    Antimicrobials:      Vital Signs Last 24 Hrs  T(C): 36.6 (06 May 2025 08:28), Max: 37.1 (05 May 2025 21:09)  T(F): 97.9 (06 May 2025 08:28), Max: 98.7 (05 May 2025 21:09)  HR: 89 (06 May 2025 08:28) (83 - 95)  BP: 137/83 (06 May 2025 08:28) (130/84 - 161/96)  BP(mean): 104 (05 May 2025 11:31) (104 - 104)  RR: 18 (06 May 2025 08:28) (18 - 18)  SpO2: 98% (06 May 2025 08:28) (95% - 98%)    Parameters below as of 06 May 2025 08:28  Patient On (Oxygen Delivery Method): room air        PHYSICAL EXAM:  General: WN/WD NAD, Non-toxic  Neurology: A&Ox3, nonfocal  Respiratory: Clear to auscultation bilaterally  CV: RRR, S1S2, no murmurs, rubs or gallops  Abdominal: Soft, Non-tender, non-distended, normal bowel sounds  Extremities: No edema, dry ecchymoses sking with appearance of previous edema with chronic venous stasis changes  Line Sites: Clear  Skin: No rash                          12.3   11.43 )-----------( 527      ( 06 May 2025 06:58 )             39.2   WBC Count: 11.43 (05-06 @ 06:58)  WBC Count: 10.12 (05-03 @ 01:01)  WBC Count: 9.64 (05-02 @ 12:07)    05-06    139  |  105  |  33[H]  ----------------------------<  103[H]  4.8   |  20[L]  |  1.54[H]  Creatinine: 1.54 (05-06 @ 07:01)    Creatinine: 1.60 (05-05 @ 12:17)    Creatinine: 1.66 (05-04 @ 07:05)    Creatinine: 1.68 (05-03 @ 01:01)    Creatinine: 1.73 (05-02 @ 12:07)    Ca    9.4      06 May 2025 07:01  Phos  2.8     05-05  Mg     2.2     05-05 05.05.25 @ 12:17) Uric Acid: 8.4 mg/dL  05.06.25 @ 07:01) Pro-Brain Natriuretic Peptide: 1517 pg/mL  02.06.20 @ 11:23) Hepatitis C Virus S/CO Ratio: 0.10 S/CO  Hepatitis C Virus Interpretation: Nonreact:   Radiology:  < from: Xray Chest 1 View- PORTABLE-Urgent (Xray Chest 1 View- PORTABLE-Urgent .) (05.05.25 @ 23:52) >  FINDINGS:  The heart is normal in size.  The lungs are clear.  There is no pneumothorax or pleural effusion.    IMPRESSION:  Clear lungs.    < end of copied text >  < from: Xray Knee 1 or 2 Views, Right (05.04.25 @ 17:06) >  IMPRESSION:  Knee joint effusion.    No acute fractures or dislocations.    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    Conspicuous protuberant superior patellar enthesophyte.    No discrete lytic or blastic lesions.    < end of copied text >      Josué Block MD; Division of Infectious Disease; Pager: [phone]; nights and weekends: [phone]

## 2025-05-07 LAB
ANION GAP SERPL CALC-SCNC: 13 MMOL/L — SIGNIFICANT CHANGE UP (ref 5–17)
ANION GAP SERPL CALC-SCNC: 14 MMOL/L — SIGNIFICANT CHANGE UP (ref 5–17)
BUN SERPL-MCNC: 38 MG/DL — HIGH (ref 7–23)
BUN SERPL-MCNC: 38 MG/DL — HIGH (ref 7–23)
CALCIUM SERPL-MCNC: 9.5 MG/DL — SIGNIFICANT CHANGE UP (ref 8.4–10.5)
CALCIUM SERPL-MCNC: 9.5 MG/DL — SIGNIFICANT CHANGE UP (ref 8.4–10.5)
CHLORIDE SERPL-SCNC: 106 MMOL/L — SIGNIFICANT CHANGE UP (ref 96–108)
CHLORIDE SERPL-SCNC: 107 MMOL/L — SIGNIFICANT CHANGE UP (ref 96–108)
CO2 SERPL-SCNC: 17 MMOL/L — LOW (ref 22–31)
CO2 SERPL-SCNC: 19 MMOL/L — LOW (ref 22–31)
CREAT SERPL-MCNC: 1.42 MG/DL — HIGH (ref 0.5–1.3)
CREAT SERPL-MCNC: 1.48 MG/DL — HIGH (ref 0.5–1.3)
EGFR: 50 ML/MIN/1.73M2 — LOW
EGFR: 50 ML/MIN/1.73M2 — LOW
EGFR: 53 ML/MIN/1.73M2 — LOW
EGFR: 53 ML/MIN/1.73M2 — LOW
GLUCOSE SERPL-MCNC: 125 MG/DL — HIGH (ref 70–99)
GLUCOSE SERPL-MCNC: 176 MG/DL — HIGH (ref 70–99)
POTASSIUM SERPL-MCNC: 5 MMOL/L — SIGNIFICANT CHANGE UP (ref 3.5–5.3)
POTASSIUM SERPL-MCNC: 6.7 MMOL/L — CRITICAL HIGH (ref 3.5–5.3)
POTASSIUM SERPL-SCNC: 5 MMOL/L — SIGNIFICANT CHANGE UP (ref 3.5–5.3)
POTASSIUM SERPL-SCNC: 6.7 MMOL/L — CRITICAL HIGH (ref 3.5–5.3)
SODIUM SERPL-SCNC: 137 MMOL/L — SIGNIFICANT CHANGE UP (ref 135–145)
SODIUM SERPL-SCNC: 139 MMOL/L — SIGNIFICANT CHANGE UP (ref 135–145)

## 2025-05-07 PROCEDURE — 73610 X-RAY EXAM OF ANKLE: CPT | Mod: 26,50

## 2025-05-07 PROCEDURE — 99231 SBSQ HOSP IP/OBS SF/LOW 25: CPT

## 2025-05-07 PROCEDURE — 99231 SBSQ HOSP IP/OBS SF/LOW 25: CPT | Mod: GC

## 2025-05-07 PROCEDURE — 73620 X-RAY EXAM OF FOOT: CPT | Mod: 26,50

## 2025-05-07 RX ORDER — ACETAMINOPHEN 500 MG/5ML
1000 LIQUID (ML) ORAL EVERY 8 HOURS
Refills: 0 | Status: COMPLETED | OUTPATIENT
Start: 2025-05-07 | End: 2025-05-10

## 2025-05-07 RX ADMIN — HEPARIN SODIUM 5000 UNIT(S): 1000 INJECTION INTRAVENOUS; SUBCUTANEOUS at 05:10

## 2025-05-07 RX ADMIN — Medication 50 MILLIGRAM(S): at 16:19

## 2025-05-07 RX ADMIN — METHYLPREDNISOLONE ACETATE 40 MILLIGRAM(S): 80 INJECTION, SUSPENSION INTRA-ARTICULAR; INTRALESIONAL; INTRAMUSCULAR; SOFT TISSUE at 05:10

## 2025-05-07 RX ADMIN — Medication 1000 MILLIGRAM(S): at 23:51

## 2025-05-07 RX ADMIN — Medication 650 MILLIGRAM(S): at 08:34

## 2025-05-07 RX ADMIN — SACUBITRIL AND VALSARTAN 1 TABLET(S): 6; 6 PELLET ORAL at 05:10

## 2025-05-07 RX ADMIN — HEPARIN SODIUM 5000 UNIT(S): 1000 INJECTION INTRAVENOUS; SUBCUTANEOUS at 23:22

## 2025-05-07 RX ADMIN — Medication 1000 MILLIGRAM(S): at 23:21

## 2025-05-07 RX ADMIN — SACUBITRIL AND VALSARTAN 1 TABLET(S): 6; 6 PELLET ORAL at 17:17

## 2025-05-07 RX ADMIN — Medication 50 MILLIGRAM(S): at 23:22

## 2025-05-07 RX ADMIN — ATORVASTATIN CALCIUM 40 MILLIGRAM(S): 80 TABLET, FILM COATED ORAL at 23:21

## 2025-05-07 RX ADMIN — METOPROLOL SUCCINATE 25 MILLIGRAM(S): 50 TABLET, EXTENDED RELEASE ORAL at 05:10

## 2025-05-07 RX ADMIN — Medication 1000 MILLIGRAM(S): at 16:19

## 2025-05-07 RX ADMIN — HEPARIN SODIUM 5000 UNIT(S): 1000 INJECTION INTRAVENOUS; SUBCUTANEOUS at 16:16

## 2025-05-07 RX ADMIN — Medication 50 MILLIGRAM(S): at 05:10

## 2025-05-07 RX ADMIN — Medication 81 MILLIGRAM(S): at 11:43

## 2025-05-07 NOTE — PROGRESS NOTE ADULT - ASSESSMENT
Sleep Apnea   WHAT YOU NEED TO KNOW:   What is sleep apnea? Sleep apnea is a condition that causes you to stop breathing for seconds or minutes at a time  This can happen many times during the night  What are the types of sleep apnea? Obstructive sleep apnea (FARA)  is the most common kind  The muscles and tissues around your throat relax and block air from passing through  Obesity, use of alcohol or cigarettes, or a family history are common causes  FARA may increase your risk for complications after surgery  Central sleep apnea (CSA)  means your brain does not send signals to the muscles that control breathing  You do not take a breath even though your airway is open  Common causes include medical conditions such as heart failure, being older than 40, or use of opioids  Complex (or mixed) sleep apnea  means you have both obstructive and central sleep apnea  What are the signs and symptoms of sleep apnea? Loud snoring or long pauses in breathing    Feeling sleepy, slow, and tired during the day    Snorting, gasping, or choking while you sleep, and waking up suddenly because of these    Feeling irritable during the day    Dry mouth or a headache in the mornings    Heavy night sweating    A hard time thinking, remembering things, or focusing on your tasks the following day    How is sleep apnea diagnosed? Your healthcare provider will ask about your signs and symptoms  Tell your provider about your medical history and what medicines you take  Your provider may ask if you smoke or if anyone in your family has sleep apnea  Your provider may ask the person who sleeps beside you about your signs  You may need any of the following:  A home sleep test  measures your heart rate, blood oxygen level, and breathing patterns  You wear a device on your finger while you sleep  A sleep study at a facility may be needed   Your blood oxygen levels, movements, and heart, lung, and brain activity are monitored  How is sleep apnea treated? Treatment depends on the kind of apnea you have:  A mouth device  may be needed if you have mild sleep apnea  These are designed to keep your throat open  Ask your dentist or healthcare provider about the best mouth device for you  A machine  may be used to help you get more air during sleep  A mask may be placed over your nose and mouth, or just your nose  The mask is hooked to the machine  You will get air through the mask  A continuous positive airway pressure (CPAP) machine  is used to keep your airway open during sleep  The machine blows a gentle stream of air into the mask when you breathe  This helps keep your airway open so you can breathe more regularly  Extra oxygen may be given through the machine  A bilevel positive airway pressure (BiPAP) machine  gives air but lowers the pressure when you breathe out  An adaptive servo-ventilator (ASV)  is a machine that learns your usual breathing pattern  Then, it uses pressure to give you air and prevent stops in your breathing  Surgery  to expand your airway or remove extra tissues may be needed  Surgery is usually only considered if other treatments do not work  How can I manage or prevent sleep apnea? Reach and maintain a healthy weight  Ask your healthcare provider what a healthy weight is for you  Ask your provider to help you create a safe weight loss plan if you are overweight  Even a small goal of a 10% weight loss can improve your symptoms  Do not smoke  Nicotine and other chemicals in cigarettes and cigars can cause lung damage  Ask your healthcare provider for information if you currently smoke and need help to quit  E-cigarettes or smokeless tobacco still contain nicotine  Talk to your healthcare provider before you use these products  Do not drink alcohol or take sedative medicine before you go to sleep    Alcohol and sedatives can relax the muscles and tissues around your 69 yo M with PMHx of HTN, HLD, CKD3 CHF, cardiomyopathy LBBB, Glaucoma, cataracts presenting for HFrEF , positive stress test for possible left Heart cath and intervention    #Polyarticular gout  Tophus+  -Risk factors: Heart failure, ?CANDICE on CKD3, Aspirin  -Exam: bilateral knee, ankle warmth, effusion of left knee, Bilateral ankle , left first MTP pain and warmth.  -Uric acid: 8.4  -Xrays: OA of knees    Recommendation:  -Xrays bILATERAL, ankle and feet   -Please send [id number] for ULT planning  -c/w colchicine 0.6mg alternate days   -Will start solumedrol 40mg stat one dose now and  daily from tomorrow AM for 2 days    D/w Dr. Pranav Brar MD, PGY-4  Rheumatology Fellow  Reachable on TEAMS       throat  This can block the airflow to your lungs  Sleep on your side or use pillows designed to prevent sleep apnea  This prevents your tongue or other tissues from blocking your throat  You can also raise the head of your bed  Call your local emergency number (911 in the [address]) if:   You have chest pain or trouble breathing  When should I call my doctor? You have new or worsening signs or symptoms  You have questions or concerns about your condition or care  CARE AGREEMENT:   You have the right to help plan your care  Learn about your health condition and how it may be treated  Discuss treatment options with your healthcare providers to decide what care you want to receive  You always have the right to refuse treatment  The above information is an  only  It is not intended as medical advice for individual conditions or treatments  Talk to your doctor, nurse or pharmacist before following any medical regimen to see if it is safe and effective for you  © Copyright [facility] 2022 Information is for End User's use only and may not be sold, redistributed or otherwise used for commercial purposes  71 yo M with PMHx of HTN, HLD, CKD3 CHF, cardiomyopathy LBBB, Glaucoma, cataracts presenting for HFrEF , positive stress test for possible left Heart cath and intervention    #Polyarticular gout  Tophus+  -Risk factors: Heart failure, ?CANDICE on CKD3, Aspirin  -Exam: bilateral knee, ankle warmth, effusion of left knee, Bilateral ankle , left first MTP pain and warmth.  -Uric acid: 8.4  -Xrays: OA of knees    Recommendation:  -Xrays bILATERAL, ankle and feet   -Please send [id number] for ULT planning  -c/w colchicine 0.6mg alternate days   -Continue solumedrol 40mg for one day tomorrow, switch to prednisone 30mg for 3 days >prednisone 20mg for 3 days> prednisone 10mg for 3 days> prednisone 5mg for 3 days and stop     Follow up with  at [address] [place] [address] upon discharge      D/w Dr. Pranav Brar MD, PGY-4  Rheumatology Fellow  Reachable on TEAMS       69 yo M with PMHx of HTN, HLD, CKD3 CHF, cardiomyopathy LBBB, Glaucoma, cataracts presenting for HFrEF , positive stress test for possible left Heart cath and intervention    #Polyarticular gout  Tophus+  -Risk factors: Heart failure, ?CANDIEC on CKD3, Aspirin  -Exam: bilateral knee, ankle warmth, effusion of left knee, Bilateral ankle , left first MTP pain and warmth.  -Uric acid: 8.4  -Xrays: OA of knees    Recommendation:  -Xrays bILATERAL, ankle and feet   -Please send [id number] for ULT planning  -c/w colchicine 0.6mg alternate days   -Continue solumedrol 40mg for one day tomorrow, then switch to prednisone 30mg for 3 days >prednisone 20mg for 3 days> prednisone 10mg for 3 days> prednisone 5mg for 3 days, then stop     Follow up with  at [address] [place] [address] upon discharge      D/w Dr. Pranav Brar MD, PGY-4  Rheumatology Fellow  Reachable on TEAMS

## 2025-05-07 NOTE — PHYSICAL THERAPY INITIAL EVALUATION ADULT - WEIGHT-BEARING RESTRICTIONS: GAIT, REHAB EVAL
AUDIOLOGY  HEARING AID APPOINTMENT    Name:  Daniela Epstein  :  1952  Age:  72 y.o.  Date of Service:  May 7, 2025    Reason for visit: Ms. Epstein is seen in the clinic today for a hearing aid check appointment. Patient complains that her hearing aids are only amplifying background noise, she is not hearing much from her right side, her battery after charging 9 hours only lasts about 8 hours. She is wearing the hearing aids about 3 days per week.    HISTORY  Hearing Aid History:  Patient wears binaural 2023 Phonak Audeo BiCROS (Audeo P90-R in the left ear and Audeo CROS P-R in the right ear) rechargeable  in the canal ([place]) hearing aids in silver gray with 2C  in the right ear, 1M  in the left ear, small open domes, and no retention lines (right CROS serial number [id number], left serial number [id number]). Repair and loss/damage warranties end 2026. No applicable in-office service plan. Not connected to [employer]. [employer].     Hearing Loss History:  Audiogram completed 2024 revealed profound sensorineural hearing loss in the right ear, and normal hearing sensitivity through 1000 Hz with a mild to moderately-severe sensorineural hearing loss in the higher frequencies in the left ear.     HEARING AIDS  Hearing Aid Physical Examination:  Right: unremarkable  Left: unremarkable    Hearing Aid Adjustment & Specific Counseling:  Devices were connected to the software via [employer]. Increased CROS balance to favor CROS. Increased Soft Noise Reduction and Noise Block in all situations. WindBlock already maxed. Decreased low frequency gain 1 click and increased high frequency gain 1 click.    Encouraged and discussed the importance of consistent use.    Recommended sending devices (and ) for in-warranty  repair. Patient wishes to defer, but will do this before the end of the warranty. Reviewed warranty end date.    IMPRESSIONS  Hearing aids  were returned to the patient and she expressed satisfaction.    RECOMMENDATIONS  - Continued consistent hearing aid use.  - Annual appointment for routine hearing aid maintenance, sooner if questions/problems arise.  - Annual audiologic evaluation, sooner if an acute change is noted.  - Continue use of hearing protective devices when in loud, impact, and/or excessive noise.  - Follow up with otolaryngology as planned.  - Follow-up with medical care team as planned.    PATIENT EDUCATION  Discussed results, impressions and recommendations with the patient. Questions were addressed and the patient was encouraged to contact our office should concerns arise.    CHARGE CAPTURE  $50. Self-pay. Paper encounter form utilized and submitted to  for payment processing.    Time for this encounter: [id number]    Abner Mack, CCC-A  Licensed Audiologist   full weight-bearing

## 2025-05-07 NOTE — PROGRESS NOTE ADULT - ATTENDING COMMENTS
Acute gout, multiple sites  Improving with steroids  Taper as outlined below   f/u upon discharge    Please recall with questions.       Dr. Eleanor Rock  Rheumatology Attending  [facility]  Rheumatology at [place]     Contact:   call the office:: [phone] or reach [place] [employer], weekdays before 5 pm   after 5 pm or on weekends, contact [phone]
Polyarticular gout   -no improvement with colchicine   -continue with colchicine for prophylaxis  -solumedrol as above  -will reassess for taper      Dr. Eleanor Rock  Rheumatology Attending  [facility]  Rheumatology at [place]     Contact:   call the office:: [phone] or reach va [employer] Teams, weekdays before 5 pm   after 5 pm or on weekends, contact [phone]

## 2025-05-07 NOTE — PROGRESS NOTE ADULT - SUBJECTIVE AND OBJECTIVE BOX
JOSE COTE  [id number]    INTERVAL HPI/OVERNIGHT EVENTS:    MEDICATIONS  (STANDING):  acetaminophen     Tablet .. 1000 milliGRAM(s) Oral every 8 hours  aspirin enteric coated 81 milliGRAM(s) Oral daily  atorvastatin 40 milliGRAM(s) Oral at bedtime  colchicine 0.6 milliGRAM(s) Oral every other day  heparin   Injectable 5000 Unit(s) SubCutaneous every 8 hours  hydrALAZINE 50 milliGRAM(s) Oral every 8 hours  methylPREDNISolone sodium succinate Injectable 40 milliGRAM(s) IV Push daily  metoprolol succinate ER 25 milliGRAM(s) Oral daily  sacubitril 24 mG/valsartan 26 mG 1 Tablet(s) Oral two times a day  sodium chloride 0.9%. 1000 milliLiter(s) (50 mL/Hr) IV Continuous <Continuous>    MEDICATIONS  (PRN):      Allergies    No Known Allergies    Intolerances        Review of Systems:   General: No fevers/chills, no fatigue  HEENT: No blurry vision, dysphagia, or odynophagia  CVS: No CP/palpitations  Resp: No SOB/wheezing  GI: No N/V/C/D/abdominal pain  MSK:   Skin: No new rashes  Neuro: No headaches      Vital Signs Last 24 Hrs  T(C): 36.2 (07 May 2025 11:52), Max: 36.7 (06 May 2025 16:15)  T(F): 97.2 (07 May 2025 11:52), Max: 98 (06 May 2025 16:15)  HR: 96 (07 May 2025 11:52) (73 - 96)  BP: 98/69 (07 May 2025 11:52) (98/69 - 147/84)  BP(mean): 79 (07 May 2025 11:52) (79 - 79)  RR: 18 (07 May 2025 11:52) (18 - 18)  SpO2: 96% (07 May 2025 11:52) (96% - 100%)    Parameters below as of 07 May 2025 11:52  Patient On (Oxygen Delivery Method): room air        Physical Exam:  General: NAD  HEENT: EOMI, MMM  Cardio: +S1/S2, RRR  Resp: CTA b/l  GI: +BS, soft, NT/ND  MSK:  Neuro: AAOx3  Psych: wnl    LABS:                        12.3   11.43 )-----------( 527      ( 06 May 2025 06:58 )             39.2     05-07    139  |  107  |  38[H]  ----------------------------<  176[H]  5.0   |  19[L]  |  1.48[H]    Ca    9.5      07 May 2025 09:27  Phos  2.8     05-05  Mg     2.2     05-05        Urinalysis Basic - ( 07 May 2025 09:27 )    Color: x / Appearance: x / SG: x / pH: x  Gluc: 176 mg/dL / Ketone: x  / Bili: x / Urobili: x   Blood: x / Protein: x / Nitrite: x   Leuk Esterase: x / RBC: x / WBC x   Sq Epi: x / Non Sq Epi: x / Bacteria: x          RADIOLOGY & ADDITIONAL TESTS:   JOSE COTE  [id number]    INTERVAL HPI/OVERNIGHT EVENTS:      Vital Signs Last 24 Hrs  T(C): 36.2 (07 May 2025 11:52), Max: 36.7 (06 May 2025 16:15)  T(F): 97.2 (07 May 2025 11:52), Max: 98 (06 May 2025 16:15)  HR: 96 (07 May 2025 11:52) (73 - 96)  BP: 98/69 (07 May 2025 11:52) (98/69 - 147/84)  BP(mean): 79 (07 May 2025 11:52) (79 - 79)  RR: 18 (07 May 2025 11:52) (18 - 18)  SpO2: 96% (07 May 2025 11:52) (96% - 100%)    Parameters below as of 07 May 2025 11:52  Patient On (Oxygen Delivery Method): room air        Physical Exam:  General: NAD  HEENT: EOMI, MMM  Cardio: +S1/S2, RRR  Resp: CTA b/l  GI: +BS, soft, NT/ND  MSK:bilateral knee, ankle warmth, effusion of left knee, Bilateral ankle , left first MTP pain and warmth.  Neuro: AAOx3  Psych: wnl    LABS:                        12.3   11.43 )-----------( 527      ( 06 May 2025 06:58 )             39.2     05-07    139  |  107  |  38[H]  ----------------------------<  176[H]  5.0   |  19[L]  |  1.48[H]    Ca    9.5      07 May 2025 09:27  Phos  2.8     05-05  Mg     2.2     05-05        Urinalysis Basic - ( 07 May 2025 09:27 )    Color: x / Appearance: x / SG: x / pH: x  Gluc: 176 mg/dL / Ketone: x  / Bili: x / Urobili: x   Blood: x / Protein: x / Nitrite: x   Leuk Esterase: x / RBC: x / WBC x   Sq Epi: x / Non Sq Epi: x / Bacteria: x          RADIOLOGY & ADDITIONAL TESTS:   JOSE COTE  [id number]    INTERVAL HPI/OVERNIGHT EVENTS:  Patient was seen bedside. Significant improvement in pain in his knees , ankles, some tenderness still noted at first MTP    Vital Signs Last 24 Hrs  T(C): 36.4 (07 May 2025 15:40), Max: 36.7 (07 May 2025 04:01)  T(F): 97.6 (07 May 2025 15:40), Max: 98 (07 May 2025 04:01)  HR: 85 (07 May 2025 15:40) (73 - 96)  BP: 115/82 (07 May 2025 15:40) (98/69 - 147/84)  BP(mean): 79 (07 May 2025 11:52) (79 - 79)  RR: 18 (07 May 2025 15:40) (18 - 18)  SpO2: 97% (07 May 2025 15:40) (96% - 100%)    Parameters below as of 07 May 2025 15:40  Patient On (Oxygen Delivery Method): room air          Physical Exam:  General: NAD  HEENT: EOMI, MMM  Cardio: +S1/S2, RRR  Resp: CTA b/l  GI: +BS, soft, NT/ND  MSK:Bilateral knee mild effusion without tenderness, rom improved but painful on right knee. No warmth, tenderness on bilateral ankle, first MTP tenderness+  Neuro: AAOx3  Psych: wnl      LABS:  cret                        12.3   11.43 )-----------( 527      ( 06 May 2025 06:58 )             39.2     05-07    139  |  107  |  38[H]  ----------------------------<  176[H]  5.0   |  19[L]  |  1.48[H]    Ca    9.5      07 May 2025 09:27                    Urinalysis Basic - ( 07 May 2025 09:27 )    Color: x / Appearance: x / SG: x / pH: x  Gluc: 176 mg/dL / Ketone: x  / Bili: x / Urobili: x   Blood: x / Protein: x / Nitrite: x   Leuk Esterase: x / RBC: x / WBC x   Sq Epi: x / Non Sq Epi: x / Bacteria: x          RADIOLOGY & ADDITIONAL TESTS:

## 2025-05-07 NOTE — PROGRESS NOTE ADULT - SUBJECTIVE AND OBJECTIVE BOX
INTERVAL HPI/OVERNIGHT EVENTS: feels well, no chest pain, no dyspnea    MEDICATIONS  (STANDING):  acetaminophen     Tablet .. 1000 milliGRAM(s) Oral every 8 hours  aspirin enteric coated 81 milliGRAM(s) Oral daily  atorvastatin 40 milliGRAM(s) Oral at bedtime  colchicine 0.6 milliGRAM(s) Oral every other day  heparin   Injectable 5000 Unit(s) SubCutaneous every 8 hours  hydrALAZINE 50 milliGRAM(s) Oral every 8 hours  methylPREDNISolone sodium succinate Injectable 40 milliGRAM(s) IV Push daily  metoprolol succinate ER 25 milliGRAM(s) Oral daily  sacubitril 24 mG/valsartan 26 mG 1 Tablet(s) Oral two times a day  sodium chloride 0.9%. 1000 milliLiter(s) (50 mL/Hr) IV Continuous <Continuous>    MEDICATIONS  (PRN):      Allergies    No Known Allergies    Intolerances      ROS:  General: Pt denies recent weight loss/fever/chills    Neurological: denies numbness or  sensation loss    Cardiovascular: denies chest pain/palpitations/leg edema    Respiratory and Thorax: denies SOB/cough/wheezing    Gastrointestinal: denies abdominal pain/diarrhea/constipation/bloody stool    Genitourinary: denies urinary frequency/urgency/ dysuria    Musculoskeletal: denies joint pain or swelling, denies restricted motion    Hematologic: denies abnormal bleeding  	    	  	    		        	    	            Vital Signs Last 24 Hrs  T(C): 36.6 (07 May 2025 21:13), Max: 36.7 (07 May 2025 04:01)  T(F): 97.8 (07 May 2025 21:13), Max: 98 (07 May 2025 04:01)  HR: 79 (07 May 2025 21:13) (73 - 96)  BP: 126/78 (07 May 2025 21:13) (98/69 - 147/84)  BP(mean): 79 (07 May 2025 11:52) (79 - 79)  RR: 18 (07 May 2025 21:13) (18 - 18)  SpO2: 98% (07 May 2025 21:13) (96% - 98%)    Parameters below as of 07 May 2025 21:13  Patient On (Oxygen Delivery Method): room air      Daily     Daily Weight in k.1 (07 May 2025 04:01)    -06 @ 07:01  -  - @ 07:00  --------------------------------------------------------  IN: 520 mL / OUT: 220 mL / NET: 300 mL    05 @ 07:01  -   @ 21:29  --------------------------------------------------------  IN: 0 mL / OUT: 300 mL / NET: -300 mL      Physical Exam:    wdwn male   no JVD  Cor RRR  lung clear   abd soft  ext no edema, mild bilateral knee pain      LABS:                        12.3   11.43 )-----------( 527      ( 06 May 2025 06:58 )             39.2         139  |  107  |  38[H]  ----------------------------<  176[H]  5.0   |  19[L]  |  1.48[H]    Ca    9.5      07 May 2025 09:27        Urinalysis Basic - ( 07 May 2025 09:27 )    Color: x / Appearance: x / SG: x / pH: x  Gluc: 176 mg/dL / Ketone: x  / Bili: x / Urobili: x   Blood: x / Protein: x / Nitrite: x   Leuk Esterase: x / RBC: x / WBC x   Sq Epi: x / Non Sq Epi: x / Bacteria: x        RADIOLOGY & ADDITIONAL TESTS:    TELE:    EKG:

## 2025-05-07 NOTE — PROGRESS NOTE ADULT - ASSESSMENT
69 yo M with PMHx of HTN, HLD, CKD3 CHF, cardiomyopathy LBBB, Glaucoma, cataracts presented to [facility] ED on 4/25/ 2025  with c/o SOB associated with cough & congestion.   TTE 4/30/25 severely decrease LV function EF 20-25%, LVH, global LV hypokinesis without regional heterogeneity. Grade 3 diastolic dysfunction.   5/2 cardiac cath demonstrated mild non obstructive CAD.    Acute joint pains related to gouty arthritis  Presentation does not indicate LE cellulitis  He has venous stasis changes, local ecchymoses  SOLUMEDROL 40 mg daily x 3 days  5/6-->  5/7  feels better, LE's improve appearance    Suggest  Continue steroids as per Rheumatology

## 2025-05-07 NOTE — PROGRESS NOTE ADULT - SUBJECTIVE AND OBJECTIVE BOX
Follow Up:  gout    Interval History/ROS:  feels better    Allergies  No Known Allergies    ANTIMICROBIALS:      OTHER MEDS:  MEDICATIONS  (STANDING):  acetaminophen     Tablet .. 1000 every 8 hours  aspirin enteric coated 81 daily  atorvastatin 40 at bedtime  colchicine 0.6 every other day  heparin   Injectable 5000 every 8 hours  hydrALAZINE 50 every 8 hours  methylPREDNISolone sodium succinate Injectable 40 daily  metoprolol succinate ER 25 daily  sacubitril 24 mG/valsartan 26 mG 1 two times a day      Vital Signs Last 24 Hrs  T(C): 36.4 (07 May 2025 15:40), Max: 36.7 (07 May 2025 04:01)  T(F): 97.6 (07 May 2025 15:40), Max: 98 (07 May 2025 04:01)  HR: 84 (07 May 2025 17:21) (73 - 96)  BP: 137/83 (07 May 2025 17:21) (98/69 - 147/84)  BP(mean): 79 (07 May 2025 11:52) (79 - 79)  RR: 18 (07 May 2025 15:40) (18 - 18)  SpO2: 97% (07 May 2025 15:40) (96% - 100%)    Parameters below as of 07 May 2025 15:40  Patient On (Oxygen Delivery Method): room air        PHYSICAL EXAM:  General: NAD, Non-toxic  Neurology: A&Ox3, nonfocal  Respiratory: Clear to auscultation bilaterally  CV: RRR, S1S2, no murmurs, rubs or gallops  Abdominal: Soft, Non-tender, non-distended, normal bowel sounds  Extremities: No edema, decreased discloration  Line Sites: Clear  Skin: No rash                          12.3   11.43 )-----------( 527      ( 06 May 2025 06:58 )             39.2   WBC Count: 11.43 (05-06 @ 06:58)  WBC Count: 10.12 (05-03 @ 01:01)    05-07    139  |  107  |  38[H]  ----------------------------<  176[H]  5.0   |  19[L]  |  1.48[H]  Creatinine: 1.48 (05-07 @ 09:27)    Creatinine: 1.42 (05-07 @ 06:49)    Creatinine: 1.54 (05-06 @ 07:01)    Creatinine: 1.60 (05-05 @ 12:17)    Creatinine: 1.66 (05-04 @ 07:05)    Creatinine: 1.68 (05-03 @ 01:01)   Ca    9.5      07 May 2025 09:27      MICROBIOLOGY:      RADIOLOGY:  < from: Xray Foot AP + Lateral, Bilateral (05.07.25 @ 17:00) >  No fractures or dislocations.    Congruent ankle mortise with smooth and intact talar dome.   Tarsometatarsal alignment maintained without evidence for a Lisfranc   injury. Bilateral medial 1st MTP tophaceous gouty changes. Preserved   remaining visualized joint spaces and no additional suspected joint   margin erosions or mineralized periarticular densities.    Bilateral calcaneal enthesophytes.    No lytic or blastic lesions.    Scant scattered granular and more focal nodular dystrophic calcifications   in posterior right ankle soft tissues.    < end of copied text >      Josué Block MD; Division of Infectious Disease; Pager: [phone]; nights and weekends: [phone]

## 2025-05-07 NOTE — PROGRESS NOTE ADULT - SUBJECTIVE AND OBJECTIVE BOX
Patient is a 69y old  Male who presents with a chief complaint of cath (06 May 2025 11:20)      SUBJECTIVE / OVERNIGHT EVENTS: Comfortable without new complaints. R knee pain improving   Review of Systems  chest pain no  palpitations no  sob no  nausea no  headache no    MEDICATIONS  (STANDING):  acetaminophen     Tablet .. 1000 milliGRAM(s) Oral every 8 hours  aspirin enteric coated 81 milliGRAM(s) Oral daily  atorvastatin 40 milliGRAM(s) Oral at bedtime  colchicine 0.6 milliGRAM(s) Oral every other day  heparin   Injectable 5000 Unit(s) SubCutaneous every 8 hours  hydrALAZINE 50 milliGRAM(s) Oral every 8 hours  methylPREDNISolone sodium succinate Injectable 40 milliGRAM(s) IV Push daily  metoprolol succinate ER 25 milliGRAM(s) Oral daily  sacubitril 24 mG/valsartan 26 mG 1 Tablet(s) Oral two times a day  sodium chloride 0.9%. 1000 milliLiter(s) (50 mL/Hr) IV Continuous <Continuous>    MEDICATIONS  (PRN):      Vital Signs Last 24 Hrs  T(C): 36.4 (07 May 2025 15:40), Max: 36.7 (06 May 2025 16:15)  T(F): 97.6 (07 May 2025 15:40), Max: 98 (06 May 2025 16:15)  HR: 85 (07 May 2025 15:40) (73 - 96)  BP: 115/82 (07 May 2025 15:40) (98/69 - 147/84)  BP(mean): 79 (07 May 2025 11:52) (79 - 79)  RR: 18 (07 May 2025 15:40) (18 - 18)  SpO2: 97% (07 May 2025 15:40) (96% - 100%)    Parameters below as of 07 May 2025 15:40  Patient On (Oxygen Delivery Method): room air        PHYSICAL EXAM:  GENERAL: NAD, well-developed  HEAD:  Atraumatic, Normocephalic  EYES: EOMI, legally blind  NECK: Supple, No JVD  CHEST/LUNG: Clear to auscultation bilaterally; No wheeze  HEART: Regular rate and rhythm; No murmurs, rubs, or gallops  ABDOMEN: Soft, Nontender, Nondistended; Bowel sounds present  EXTREMITIES: R knee warm and tender   PSYCH: AAOx2  NEUROLOGY: non-focal  SKIN: No rashes or lesions    LABS:                        12.3   11.43 )-----------( 527      ( 06 May 2025 06:58 )             39.2     05-07    139  |  107  |  38[H]  ----------------------------<  176[H]  5.0   |  19[L]  |  1.48[H]    Ca    9.5      07 May 2025 09:27            Urinalysis Basic - ( 07 May 2025 09:27 )    Color: x / Appearance: x / SG: x / pH: x  Gluc: 176 mg/dL / Ketone: x  / Bili: x / Urobili: x   Blood: x / Protein: x / Nitrite: x   Leuk Esterase: x / RBC: x / WBC x   Sq Epi: x / Non Sq Epi: x / Bacteria: x          RADIOLOGY & ADDITIONAL TESTS:    Imaging Personally Reviewed:    Consultant(s) Notes Reviewed:      Care Discussed with Consultants/Other Providers:

## 2025-05-07 NOTE — PROGRESS NOTE ADULT - ASSESSMENT
69 yr male with HTN DM HLD cardiomyopathy HFrEF chronic renal failure , creatinine 1.5-1.8, LBBB, medical noncompliance presents with acute pulmonary edema , borderline elevated troponin to Nicolasa, + stress test, diuresed , cath today with nonobstructive disease. Vision poor and gait unsteady, lives with family. bilateral knee pain c/w gout, entresto  and metoprolol, hydralazine restarted with improvement    advise    1.  continue entresto 24/26  BID  2. continue metoprolol XL 25 mg daily  3. continue. hydralazine 50 TID   4. K 4,8 , if Bp elevated , can increase hydralazine to 100 TiD  5. diuretics on hold - will continue to hold spironolactone, lasix, with active gout and no dyspnea, lying flat comfortably  6. will continue to followup, if LVEF remains <35% despite medical compliance will consider for CRT ICD in 3 months

## 2025-05-08 LAB
ADD ON TEST-SPECIMEN IN LAB: SIGNIFICANT CHANGE UP
ANION GAP SERPL CALC-SCNC: 16 MMOL/L — SIGNIFICANT CHANGE UP (ref 5–17)
BUN SERPL-MCNC: 51 MG/DL — HIGH (ref 7–23)
CALCIUM SERPL-MCNC: 9.4 MG/DL — SIGNIFICANT CHANGE UP (ref 8.4–10.5)
CHLORIDE SERPL-SCNC: 104 MMOL/L — SIGNIFICANT CHANGE UP (ref 96–108)
CO2 SERPL-SCNC: 18 MMOL/L — LOW (ref 22–31)
CREAT SERPL-MCNC: 1.75 MG/DL — HIGH (ref 0.5–1.3)
EGFR: 41 ML/MIN/1.73M2 — LOW
EGFR: 41 ML/MIN/1.73M2 — LOW
GLUCOSE SERPL-MCNC: 175 MG/DL — HIGH (ref 70–99)
HCT VFR BLD CALC: 37.9 % — LOW (ref 39–50)
HGB BLD-MCNC: 12 G/DL — LOW (ref 13–17)
MCHC RBC-ENTMCNC: 22.4 PG — LOW (ref 27–34)
MCHC RBC-ENTMCNC: 31.7 G/DL — LOW (ref 32–36)
MCV RBC AUTO: 70.7 FL — LOW (ref 80–100)
NRBC BLD AUTO-RTO: 0 /100 WBCS — SIGNIFICANT CHANGE UP (ref 0–0)
NT-PROBNP SERPL-SCNC: 302 PG/ML — HIGH (ref 0–300)
PLATELET # BLD AUTO: 649 K/UL — HIGH (ref 150–400)
POTASSIUM SERPL-MCNC: 4.6 MMOL/L — SIGNIFICANT CHANGE UP (ref 3.5–5.3)
POTASSIUM SERPL-SCNC: 4.6 MMOL/L — SIGNIFICANT CHANGE UP (ref 3.5–5.3)
RBC # BLD: 5.36 M/UL — SIGNIFICANT CHANGE UP (ref 4.2–5.8)
RBC # FLD: 17.8 % — HIGH (ref 10.3–14.5)
SODIUM SERPL-SCNC: 138 MMOL/L — SIGNIFICANT CHANGE UP (ref 135–145)
WBC # BLD: 12.36 K/UL — HIGH (ref 3.8–10.5)
WBC # FLD AUTO: 12.36 K/UL — HIGH (ref 3.8–10.5)

## 2025-05-08 RX ORDER — PREDNISONE 20 MG/1
TABLET ORAL
Refills: 0 | Status: DISCONTINUED | OUTPATIENT
Start: 2025-05-09 | End: 2025-05-15

## 2025-05-08 RX ORDER — WHITE PETROLATUM 1 G/G
1 OINTMENT TOPICAL DAILY
Refills: 0 | Status: DISCONTINUED | OUTPATIENT
Start: 2025-05-08 | End: 2025-05-15

## 2025-05-08 RX ORDER — PREDNISONE 20 MG/1
30 TABLET ORAL DAILY
Refills: 0 | Status: COMPLETED | OUTPATIENT
Start: 2025-05-09 | End: 2025-05-11

## 2025-05-08 RX ORDER — PREDNISONE 20 MG/1
10 TABLET ORAL DAILY
Refills: 0 | Status: DISCONTINUED | OUTPATIENT
Start: 2025-05-15 | End: 2025-05-15

## 2025-05-08 RX ORDER — PREDNISONE 20 MG/1
20 TABLET ORAL DAILY
Refills: 0 | Status: COMPLETED | OUTPATIENT
Start: 2025-05-12 | End: 2025-05-14

## 2025-05-08 RX ORDER — PREDNISONE 20 MG/1
5 TABLET ORAL DAILY
Refills: 0 | Status: CANCELLED | OUTPATIENT
Start: 2025-05-18 | End: 2025-05-15

## 2025-05-08 RX ADMIN — HEPARIN SODIUM 5000 UNIT(S): 1000 INJECTION INTRAVENOUS; SUBCUTANEOUS at 14:13

## 2025-05-08 RX ADMIN — METHYLPREDNISOLONE ACETATE 40 MILLIGRAM(S): 80 INJECTION, SUSPENSION INTRA-ARTICULAR; INTRALESIONAL; INTRAMUSCULAR; SOFT TISSUE at 05:36

## 2025-05-08 RX ADMIN — Medication 1000 MILLIGRAM(S): at 14:30

## 2025-05-08 RX ADMIN — Medication 1000 MILLIGRAM(S): at 22:06

## 2025-05-08 RX ADMIN — Medication 50 MILLIGRAM(S): at 14:14

## 2025-05-08 RX ADMIN — METOPROLOL SUCCINATE 25 MILLIGRAM(S): 50 TABLET, EXTENDED RELEASE ORAL at 05:35

## 2025-05-08 RX ADMIN — HEPARIN SODIUM 5000 UNIT(S): 1000 INJECTION INTRAVENOUS; SUBCUTANEOUS at 22:05

## 2025-05-08 RX ADMIN — SACUBITRIL AND VALSARTAN 1 TABLET(S): 6; 6 PELLET ORAL at 05:36

## 2025-05-08 RX ADMIN — HEPARIN SODIUM 5000 UNIT(S): 1000 INJECTION INTRAVENOUS; SUBCUTANEOUS at 05:36

## 2025-05-08 RX ADMIN — Medication 50 MILLIGRAM(S): at 05:35

## 2025-05-08 RX ADMIN — Medication 1000 MILLIGRAM(S): at 06:38

## 2025-05-08 RX ADMIN — COLCHICINE 0.6 MILLIGRAM(S): 0.6 TABLET, FILM COATED ORAL at 12:06

## 2025-05-08 RX ADMIN — ATORVASTATIN CALCIUM 40 MILLIGRAM(S): 80 TABLET, FILM COATED ORAL at 22:06

## 2025-05-08 RX ADMIN — Medication 50 MILLIGRAM(S): at 22:06

## 2025-05-08 RX ADMIN — Medication 1000 MILLIGRAM(S): at 14:14

## 2025-05-08 RX ADMIN — Medication 81 MILLIGRAM(S): at 12:06

## 2025-05-08 RX ADMIN — WHITE PETROLATUM 1 APPLICATION(S): 1 OINTMENT TOPICAL at 12:06

## 2025-05-08 RX ADMIN — SACUBITRIL AND VALSARTAN 1 TABLET(S): 6; 6 PELLET ORAL at 17:50

## 2025-05-08 RX ADMIN — Medication 1000 MILLIGRAM(S): at 05:38

## 2025-05-08 NOTE — PROGRESS NOTE ADULT - SUBJECTIVE AND OBJECTIVE BOX
Patient is a 69y old  Male who presents with a chief complaint of joint pains (07 May 2025 19:14)      SUBJECTIVE / OVERNIGHT EVENTS: feels better.   Review of Systems  chest pain no  palpitations no  sob no  nausea no  headache no    MEDICATIONS  (STANDING):  acetaminophen     Tablet .. 1000 milliGRAM(s) Oral every 8 hours  AQUAPHOR (petrolatum Ointment) 1 Application(s) Topical daily  aspirin enteric coated 81 milliGRAM(s) Oral daily  atorvastatin 40 milliGRAM(s) Oral at bedtime  colchicine 0.6 milliGRAM(s) Oral every other day  heparin   Injectable 5000 Unit(s) SubCutaneous every 8 hours  hydrALAZINE 50 milliGRAM(s) Oral every 8 hours  metoprolol succinate ER 25 milliGRAM(s) Oral daily  sacubitril 24 mG/valsartan 26 mG 1 Tablet(s) Oral two times a day  sodium chloride 0.9%. 1000 milliLiter(s) (50 mL/Hr) IV Continuous <Continuous>    MEDICATIONS  (PRN):      Vital Signs Last 24 Hrs  T(C): 36.4 (08 May 2025 11:37), Max: 36.7 (08 May 2025 00:05)  T(F): 97.5 (08 May 2025 11:37), Max: 98 (08 May 2025 00:05)  HR: 90 (08 May 2025 11:37) (74 - 90)  BP: 113/74 (08 May 2025 11:37) (113/74 - 137/83)  BP(mean): 87 (08 May 2025 11:37) (87 - 87)  RR: 18 (08 May 2025 11:37) (18 - 18)  SpO2: 97% (08 May 2025 11:37) (97% - 98%)    Parameters below as of 08 May 2025 11:37  Patient On (Oxygen Delivery Method): room air        PHYSICAL EXAM:  GENERAL: NAD, well-developed  HEAD:  Atraumatic, Normocephalic  EYES: Legally blind  NECK: Supple, No JVD  CHEST/LUNG: Clear to auscultation bilaterally; No wheeze  HEART: Regular rate and rhythm; No murmurs, rubs, or gallops  ABDOMEN: Soft, Nontender, Nondistended; Bowel sounds present  EXTREMITIES:  R knee less tender   PSYCH: AAOx3  NEUROLOGY: non-focal  SKIN: No rashes or lesions    LABS:                        12.0   12.36 )-----------( 649      ( 08 May 2025 09:20 )             37.9     05-08    138  |  104  |  51[H]  ----------------------------<  175[H]  4.6   |  18[L]  |  1.75[H]    Ca    9.4      08 May 2025 09:20            Urinalysis Basic - ( 08 May 2025 09:20 )    Color: x / Appearance: x / SG: x / pH: x  Gluc: 175 mg/dL / Ketone: x  / Bili: x / Urobili: x   Blood: x / Protein: x / Nitrite: x   Leuk Esterase: x / RBC: x / WBC x   Sq Epi: x / Non Sq Epi: x / Bacteria: x          RADIOLOGY & ADDITIONAL TESTS:    Imaging Personally Reviewed:    Consultant(s) Notes Reviewed:      Care Discussed with Consultants/Other Providers:

## 2025-05-08 NOTE — PROGRESS NOTE ADULT - ASSESSMENT
69 m with     Cardiomyopathy  - Entresto,   - hold Aldactone   - Cardiology follow  - if EF <35 in 3 mo will need AICD    HTN  - control    CKD  - follow Cr, lytes     R knee pain / Gout Acute  - Xray knee as above  - Uric acid 8.4  - Colchicine every other day  - Steroid for 3 days  - Rheumatology follow    Cellulitis r/o  - ID evaluation noted  - QuantiFeron Gold TB    DVT prophylaxis    DCP rehab.     d/w patient, consultant, ACP      Ar Abdi MD phone [phone]

## 2025-05-08 NOTE — PROGRESS NOTE ADULT - SUBJECTIVE AND OBJECTIVE BOX
INTERVAL HPI/OVERNIGHT EVENTS: remains feeling well, no chest pain, no shortnesso f breath    MEDICATIONS  (STANDING):  acetaminophen     Tablet .. 1000 milliGRAM(s) Oral every 8 hours  AQUAPHOR (petrolatum Ointment) 1 Application(s) Topical daily  aspirin enteric coated 81 milliGRAM(s) Oral daily  atorvastatin 40 milliGRAM(s) Oral at bedtime  colchicine 0.6 milliGRAM(s) Oral every other day  heparin   Injectable 5000 Unit(s) SubCutaneous every 8 hours  hydrALAZINE 50 milliGRAM(s) Oral every 8 hours  metoprolol succinate ER 25 milliGRAM(s) Oral daily  sacubitril 24 mG/valsartan 26 mG 1 Tablet(s) Oral two times a day  sodium chloride 0.9%. 1000 milliLiter(s) (50 mL/Hr) IV Continuous <Continuous>    MEDICATIONS  (PRN):      Allergies    No Known Allergies    Intolerances      ROS:  General: Pt denies recent weight loss/fever/chills    Neurological: denies numbness or  sensation loss    Cardiovascular: denies chest pain/palpitations/leg edema    Respiratory and Thorax: denies SOB/cough/wheezing    Gastrointestinal: denies abdominal pain/diarrhea/constipation/bloody stool    Genitourinary: denies urinary frequency/urgency/ dysuria    Musculoskeletal: denies joint pain or swelling, denies restricted motion    Hematologic: denies abnormal bleeding  	    	  	    		        	    	            Vital Signs Last 24 Hrs  T(C): 36.4 (08 May 2025 19:04), Max: 36.7 (08 May 2025 00:05)  T(F): 97.6 (08 May 2025 19:04), Max: 98 (08 May 2025 00:05)  HR: 74 (08 May 2025 19:04) (74 - 90)  BP: 127/73 (08 May 2025 19:04) (113/74 - 130/80)  BP(mean): 87 (08 May 2025 11:37) (87 - 87)  RR: 18 (08 May 2025 19:04) (18 - 18)  SpO2: 97% (08 May 2025 19:04) (97% - 98%)    Parameters below as of 08 May 2025 19:04  Patient On (Oxygen Delivery Method): room air      Daily     Daily Weight in k.6 (08 May 2025 04:58)    05-07 @ 07:01  -  05-08 @ 07:00  --------------------------------------------------------  IN: 0 mL / OUT: 700 mL / NET: -700 mL     @ 07:01  -   @ 20:59  --------------------------------------------------------  IN: 720 mL / OUT: 900 mL / NET: -180 mL      Physical Exam:    wdwn male   no JVD  cor RRR  lung clear   abd soft   ext no edema      LABS:                        12.0   12.36 )-----------( 649      ( 08 May 2025 09:20 )             37.9         138  |  104  |  51[H]  ----------------------------<  175[H]  4.6   |  18[L]  |  1.75[H]    Ca    9.4      08 May 2025 09:20        Urinalysis Basic - ( 08 May 2025 09:20 )    Color: x / Appearance: x / SG: x / pH: x  Gluc: 175 mg/dL / Ketone: x  / Bili: x / Urobili: x   Blood: x / Protein: x / Nitrite: x   Leuk Esterase: x / RBC: x / WBC x   Sq Epi: x / Non Sq Epi: x / Bacteria: x        RADIOLOGY & ADDITIONAL TESTS:    TELE:    EKG:

## 2025-05-09 LAB
ANION GAP SERPL CALC-SCNC: 15 MMOL/L — SIGNIFICANT CHANGE UP (ref 5–17)
BUN SERPL-MCNC: 50 MG/DL — HIGH (ref 7–23)
CALCIUM SERPL-MCNC: 9.4 MG/DL — SIGNIFICANT CHANGE UP (ref 8.4–10.5)
CHLORIDE SERPL-SCNC: 106 MMOL/L — SIGNIFICANT CHANGE UP (ref 96–108)
CO2 SERPL-SCNC: 19 MMOL/L — LOW (ref 22–31)
CREAT SERPL-MCNC: 1.67 MG/DL — HIGH (ref 0.5–1.3)
EGFR: 43 ML/MIN/1.73M2 — LOW
EGFR: 43 ML/MIN/1.73M2 — LOW
GAMMA INTERFERON BACKGROUND BLD IA-ACNC: 0.02 IU/ML — SIGNIFICANT CHANGE UP
GLUCOSE SERPL-MCNC: 99 MG/DL — SIGNIFICANT CHANGE UP (ref 70–99)
M TB IFN-G BLD-IMP: ABNORMAL
M TB IFN-G CD4+ BCKGRND COR BLD-ACNC: 0 IU/ML — SIGNIFICANT CHANGE UP
M TB IFN-G CD4+CD8+ BCKGRND COR BLD-ACNC: 0 IU/ML — SIGNIFICANT CHANGE UP
POTASSIUM SERPL-MCNC: 4.5 MMOL/L — SIGNIFICANT CHANGE UP (ref 3.5–5.3)
POTASSIUM SERPL-SCNC: 4.5 MMOL/L — SIGNIFICANT CHANGE UP (ref 3.5–5.3)
QUANT TB PLUS MITOGEN MINUS NIL: 0.08 IU/ML — SIGNIFICANT CHANGE UP
SODIUM SERPL-SCNC: 140 MMOL/L — SIGNIFICANT CHANGE UP (ref 135–145)

## 2025-05-09 RX ADMIN — PREDNISONE 30 MILLIGRAM(S): 20 TABLET ORAL at 05:45

## 2025-05-09 RX ADMIN — Medication 50 MILLIGRAM(S): at 22:16

## 2025-05-09 RX ADMIN — HEPARIN SODIUM 5000 UNIT(S): 1000 INJECTION INTRAVENOUS; SUBCUTANEOUS at 05:45

## 2025-05-09 RX ADMIN — SACUBITRIL AND VALSARTAN 1 TABLET(S): 6; 6 PELLET ORAL at 17:33

## 2025-05-09 RX ADMIN — Medication 1000 MILLIGRAM(S): at 14:30

## 2025-05-09 RX ADMIN — METOPROLOL SUCCINATE 25 MILLIGRAM(S): 50 TABLET, EXTENDED RELEASE ORAL at 05:46

## 2025-05-09 RX ADMIN — Medication 1000 MILLIGRAM(S): at 22:16

## 2025-05-09 RX ADMIN — WHITE PETROLATUM 1 APPLICATION(S): 1 OINTMENT TOPICAL at 12:58

## 2025-05-09 RX ADMIN — Medication 50 MILLIGRAM(S): at 05:46

## 2025-05-09 RX ADMIN — HEPARIN SODIUM 5000 UNIT(S): 1000 INJECTION INTRAVENOUS; SUBCUTANEOUS at 22:16

## 2025-05-09 RX ADMIN — HEPARIN SODIUM 5000 UNIT(S): 1000 INJECTION INTRAVENOUS; SUBCUTANEOUS at 14:04

## 2025-05-09 RX ADMIN — ATORVASTATIN CALCIUM 40 MILLIGRAM(S): 80 TABLET, FILM COATED ORAL at 22:17

## 2025-05-09 RX ADMIN — Medication 1000 MILLIGRAM(S): at 22:46

## 2025-05-09 RX ADMIN — Medication 50 MILLIGRAM(S): at 14:04

## 2025-05-09 RX ADMIN — Medication 1000 MILLIGRAM(S): at 14:04

## 2025-05-09 RX ADMIN — Medication 81 MILLIGRAM(S): at 12:58

## 2025-05-09 RX ADMIN — Medication 1000 MILLIGRAM(S): at 05:46

## 2025-05-09 RX ADMIN — SACUBITRIL AND VALSARTAN 1 TABLET(S): 6; 6 PELLET ORAL at 05:46

## 2025-05-09 NOTE — DISCHARGE NOTE NURSING/CASE MANAGEMENT/SOCIAL WORK - FINANCIAL ASSISTANCE
[facility] provides services at a reduced cost to those who are determined to be eligible through [facility]’s financial assistance program. Information regarding [facility]’s financial assistance program can be found by going to [email or web address] or by calling [phone].

## 2025-05-09 NOTE — DISCHARGE NOTE NURSING/CASE MANAGEMENT/SOCIAL WORK - FLU SEASON?
Arrive to hospital on your day of surgery at  9AM    Take the following medications the morning of surgery:  NONE      If you are on prescription narcotic pain medication to control your pain you may also take that medication the morning of surgery.    General Instructions:  Do not eat solid food after midnight the night before surgery.  You may drink clear liquids day of surgery but must stop at least one hour before your hospital arrival time.  It is beneficial for you to have a clear drink that contains carbohydrates the day of surgery.  We suggest a 12 to 20 ounce bottle of Gatorade or Powerade for non-diabetic patients or a 12 to 20 ounce bottle of G2 or Powerade Zero for diabetic patients. (Pediatric patients, are not advised to drink a 12 to 20 ounce carbohydrate drink)    Clear liquids are liquids you can see through.  Nothing red in color.     Plain water                               Sports drinks  Sodas                                   Gelatin (Jell-O)  Fruit juices without pulp such as white grape juice and apple juice  Popsicles that contain no fruit or yogurt  Tea or coffee (no cream or milk added)  Gatorade / Powerade  G2 / Powerade Zero    Infants may have breast milk up to four hours before surgery.  Infants drinking formula may drink formula up to six hours before surgery.   Patients who avoid smoking, chewing tobacco and alcohol for 4 weeks prior to surgery have a reduced risk of post-operative complications.  Quit smoking as many days before surgery as you can.  Do not smoke, use chewing tobacco or drink alcohol the day of surgery.   If applicable bring your C-PAP/ BI-PAP machine.  Bring any papers given to you in the doctor’s office.  Wear clean comfortable clothes.  Do not wear contact lenses, false eyelashes or make-up.  Bring a case for your glasses.   Bring crutches or walker if applicable.  Remove all piercings.  Leave jewelry and any other valuables at home.  Hair extensions with metal  clips must be removed prior to surgery.  The Pre-Admission Testing nurse will instruct you to bring medications if unable to obtain an accurate list in Pre-Admission Testing.        If you were given a blood bank ID arm band remember to bring it with you the day of surgery.    Preventing a Surgical Site Infection:  For 2 to 3 days before surgery, avoid shaving with a razor because the razor can irritate skin and make it easier to develop an infection.    Any areas of open skin can increase the risk of a post-operative wound infection by allowing bacteria to enter and travel throughout the body.  Notify your surgeon if you have any skin wounds / rashes even if it is not near the expected surgical site.  The area will need assessed to determine if surgery should be delayed until it is healed.  The night prior to surgery shower using a fresh bar of anti-bacterial soap (such as Dial) and clean washcloth.  Sleep in a clean bed with clean clothing.  Do not allow pets to sleep with you.  Shower on the morning of surgery using a fresh bar of anti-bacterial soap (such as Dial) and clean washcloth.  Dry with a clean towel and dress in clean clothing.  Ask your surgeon if you will be receiving antibiotics prior to surgery.  Make sure you, your family, and all healthcare providers clean their hands with soap and water or an alcohol based hand  before caring for you or your wound.    Day of surgery:  Your arrival time is approximately two hours before your scheduled surgery time.  Upon arrival, a Pre-op nurse and Anesthesiologist will review your health history, obtain vital signs, and answer questions you may have.  The only belongings needed at this time will be a list of your home medications and if applicable your C-PAP/BI-PAP machine.  A Pre-op nurse will start an IV and you may receive medication in preparation for surgery, including something to help you relax.     Please be aware that surgery does come with  discomfort.  We want to make every effort to control your discomfort so please discuss any uncontrolled symptoms with your nurse.   Your doctor will most likely have prescribed pain medications.      If you are going home after surgery you will receive individualized written care instructions before being discharged.  A responsible adult must drive you to and from the hospital on the day of your surgery and stay with you for 24 hours.  Discharge prescriptions can be filled by the hospital pharmacy during regular pharmacy hours.  If you are having surgery late in the day/evening your prescription may be e-prescribed to your pharmacy.  Please verify your pharmacy hours or chose a 24 hour pharmacy to avoid not having access to your prescription because your pharmacy has closed for the day.    If you are staying overnight following surgery, you will be transported to your hospital room following the recovery period.  [facility] has all private rooms.    If you have any questions please call Pre-Admission Testing at [phone].  Deductibles and co-payments are collected on the day of service. Please be prepared to pay the required co-pay, deductible or deposit on the day of service as defined by your plan.    Patient Education for Self-Quarantine Process    Following your COVID testing, we strongly recommend that you wear a mask when you are with other people and practice social distancing.   Limit your activities to only required outings.  Wash your hands with soap and water frequently for at least 20 seconds.   Avoid touching your eyes, nose and mouth with unwashed hands.  Do not share anything - utensils, drinking glasses, food from the same bowl.   Sanitize household surfaces daily. Include all high touch areas (door handles, light switches, phones, countertops, etc.)    Call your surgeon immediately if you experience any of the following symptoms:  Sore Throat  Shortness of Breath or difficulty  breathing  Cough  Chills  Body soreness or muscle pain  Headache  Fever  New loss of taste or smell  Do not arrive for your surgery ill.  Your procedure will need to be rescheduled to another time.  You will need to call your physician before the day of surgery to avoid any unnecessary exposure to hospital staff as well as other patients.     CHLORHEXIDINE CLOTH INSTRUCTIONS  The morning of surgery follow these instructions using the Chlorhexidine cloths you've been given.  These steps reduce bacteria on the body.  Do not use the cloths near your eyes, ears mouth, genitalia or on open wounds.  Throw the cloths away after use but do not try to flush them down a toilet.      Open and remove one cloth at a time from the package.    Leave the cloth unfolded and begin the bathing.  Massage the skin with the cloths using gentle pressure to remove bacteria.  Do not scrub harshly.   Follow the steps below with one 2% CHG cloth per area (6 total cloths).  One cloth for neck, shoulders and chest.  One cloth for both arms, hands, fingers and underarms (do underarms last).  One cloth for the abdomen followed by groin.  One cloth for right leg and foot including between the toes.  One cloth for left leg and foot including between the toes.  The last cloth is to be used for the back of the neck, back and buttocks.    Allow the CHG to air dry 3 minutes on the skin which will give it time to work and decrease the chance of irritation.  The skin may feel sticky until it is dry.  Do not rinse with water or any other liquid or you will lose the beneficial effects of the CHG.  If mild skin irritation occurs, do rinse the skin to remove the CHG.  Report this to the nurse at time of admission.  Do not apply lotions, creams, ointments, deodorants or perfumes after using the clothes. Dress in clean clothes before coming to the hospital.    No

## 2025-05-09 NOTE — PROGRESS NOTE ADULT - ASSESSMENT
69 m with     Cardiomyopathy  - Entresto,   - hold Aldactone   - Cardiology follow  - if EF <35 in 3 mo will need AICD    HTN  - control    CKD  - follow Cr, lytes     R knee pain / Gout Acute  - Xray knee as above  - Uric acid 8.4  - Colchicine every other day  - Prednisone taper  - Rheumatology follow    Cellulitis r/o  - QuantiFeron Gold TB indeterminate.    - ID to reevaluate    DVT prophylaxis    DCP rehab.     d/w patient      Ar Abdi MD phone [phone]

## 2025-05-09 NOTE — PROGRESS NOTE ADULT - SUBJECTIVE AND OBJECTIVE BOX
INTERVAL HPI/OVERNIGHT EVENTS: patijettn feels well, no chest pain, ambulating with walker, nodyspnea    MEDICATIONS  (STANDING):  acetaminophen     Tablet .. 1000 milliGRAM(s) Oral every 8 hours  AQUAPHOR (petrolatum Ointment) 1 Application(s) Topical daily  aspirin enteric coated 81 milliGRAM(s) Oral daily  atorvastatin 40 milliGRAM(s) Oral at bedtime  colchicine 0.6 milliGRAM(s) Oral every other day  heparin   Injectable 5000 Unit(s) SubCutaneous every 8 hours  hydrALAZINE 50 milliGRAM(s) Oral every 8 hours  metoprolol succinate ER 25 milliGRAM(s) Oral daily  predniSONE   Tablet   Oral   predniSONE   Tablet 30 milliGRAM(s) Oral daily  sacubitril 24 mG/valsartan 26 mG 1 Tablet(s) Oral two times a day  sodium chloride 0.9%. 1000 milliLiter(s) (50 mL/Hr) IV Continuous <Continuous>    MEDICATIONS  (PRN):      Allergies    No Known Allergies    Intolerances      ROS:  General: Pt denies recent weight loss/fever/chills    Neurological: denies numbness or  sensation loss    Cardiovascular: denies chest pain/palpitations/leg edema    Respiratory and Thorax: denies SOB/cough/wheezing    Gastrointestinal: denies abdominal pain/diarrhea/constipation/bloody stool    Genitourinary: denies urinary frequency/urgency/ dysuria    Musculoskeletal: denies joint pain or swelling, denies restricted motion    Hematologic: denies abnormal bleeding  	    	  	    		        	    	            Vital Signs Last 24 Hrs  T(C): 36.3 (09 May 2025 11:12), Max: 36.4 (08 May 2025 19:04)  T(F): 97.4 (09 May 2025 11:12), Max: 97.6 (08 May 2025 19:04)  HR: 94 (09 May 2025 11:12) (71 - 94)  BP: 130/81 (09 May 2025 11:12) (116/72 - 132/71)  BP(mean): --  RR: 18 (09 May 2025 11:12) (18 - 18)  SpO2: 97% (09 May 2025 11:12) (96% - 99%)    Parameters below as of 09 May 2025 11:12  Patient On (Oxygen Delivery Method): room air      Daily     Daily Weight in k.4 (09 May 2025 07:55)    05-08 @ 07:01  -   @ 07:00  --------------------------------------------------------  IN: 720 mL / OUT: 1500 mL / NET: -780 mL     @ 07:  -   @ 18:27  --------------------------------------------------------  IN: 720 mL / OUT: 500 mL / NET: 220 mL      Physical Exam:    wdwn male  no JVD  cor RRR  lung clear  abd soft   ext no edema      LABS:                        12.0   12.36 )-----------( 649      ( 08 May 2025 09:20 )             37.9         140  |  106  |  50[H]  ----------------------------<  99  4.5   |  19[L]  |  1.67[H]    Ca    9.4      09 May 2025 07:07        Urinalysis Basic - ( 09 May 2025 07:07 )    Color: x / Appearance: x / SG: x / pH: x  Gluc: 99 mg/dL / Ketone: x  / Bili: x / Urobili: x   Blood: x / Protein: x / Nitrite: x   Leuk Esterase: x / RBC: x / WBC x   Sq Epi: x / Non Sq Epi: x / Bacteria: x        RADIOLOGY & ADDITIONAL TESTS:    TELE:    EKG:

## 2025-05-09 NOTE — PROGRESS NOTE ADULT - SUBJECTIVE AND OBJECTIVE BOX
Patient is a 69y old  Male who presents with a chief complaint of joint pains (07 May 2025 19:14)      SUBJECTIVE / OVERNIGHT EVENTS: Comfortable without new complaints.   Review of Systems  chest pain no  palpitations no  sob no  nausea no  headache no    MEDICATIONS  (STANDING):  acetaminophen     Tablet .. 1000 milliGRAM(s) Oral every 8 hours  AQUAPHOR (petrolatum Ointment) 1 Application(s) Topical daily  aspirin enteric coated 81 milliGRAM(s) Oral daily  atorvastatin 40 milliGRAM(s) Oral at bedtime  colchicine 0.6 milliGRAM(s) Oral every other day  heparin   Injectable 5000 Unit(s) SubCutaneous every 8 hours  hydrALAZINE 50 milliGRAM(s) Oral every 8 hours  metoprolol succinate ER 25 milliGRAM(s) Oral daily  predniSONE   Tablet   Oral   predniSONE   Tablet 30 milliGRAM(s) Oral daily  sacubitril 24 mG/valsartan 26 mG 1 Tablet(s) Oral two times a day  sodium chloride 0.9%. 1000 milliLiter(s) (50 mL/Hr) IV Continuous <Continuous>    MEDICATIONS  (PRN):      Vital Signs Last 24 Hrs  T(C): 36.3 (09 May 2025 11:12), Max: 36.4 (08 May 2025 19:04)  T(F): 97.4 (09 May 2025 11:12), Max: 97.6 (08 May 2025 19:04)  HR: 94 (09 May 2025 11:12) (71 - 94)  BP: 130/81 (09 May 2025 11:12) (116/72 - 132/71)  BP(mean): --  RR: 18 (09 May 2025 11:12) (18 - 18)  SpO2: 97% (09 May 2025 11:12) (96% - 99%)    Parameters below as of 09 May 2025 11:12  Patient On (Oxygen Delivery Method): room air        PHYSICAL EXAM:  GENERAL: NAD, well-developed  HEAD:  Atraumatic, Normocephalic  EYES: EOMI Legally blind   NECK: Supple, No JVD  CHEST/LUNG: Clear to auscultation bilaterally; No wheeze  HEART: Regular rate and rhythm; No murmurs, rubs, or gallops  ABDOMEN: Soft, Nontender, Nondistended; Bowel sounds present  EXTREMITIES:  2+ Peripheral Pulses, No clubbing, cyanosis, or edema  PSYCH: AAOx3  NEUROLOGY: non-focal  SKIN: No rashes or lesions    LABS:                        12.0   12.36 )-----------( 649      ( 08 May 2025 09:20 )             37.9     05-09    140  |  106  |  50[H]  ----------------------------<  99  4.5   |  19[L]  |  1.67[H]    Ca    9.4      09 May 2025 07:07            Urinalysis Basic - ( 09 May 2025 07:07 )    Color: x / Appearance: x / SG: x / pH: x  Gluc: 99 mg/dL / Ketone: x  / Bili: x / Urobili: x   Blood: x / Protein: x / Nitrite: x   Leuk Esterase: x / RBC: x / WBC x   Sq Epi: x / Non Sq Epi: x / Bacteria: x          RADIOLOGY & ADDITIONAL TESTS:    Imaging Personally Reviewed:    Consultant(s) Notes Reviewed:      Care Discussed with Consultants/Other Providers:

## 2025-05-09 NOTE — PROGRESS NOTE ADULT - ASSESSMENT
69 yr male with HTN DM HLD cardiomyopathy HFrEF chronic renal failure , creatinine 1.5-1.8, LBBB, medical noncompliance presents with acute pulmonary edema , borderline elevated troponin to [place], + stress test, diuresed , cath today with nonobstructive disease. Vision poor and gait unsteady, lives with family. bilateral knee pain c/w gout, entresto  and metoprolol, hydralazine restarted with improvement    advise    1.  continue entresto 24/26  BID  2. continue metoprolol XL 25 mg daily  3. continue. hydralazine 50 TID   4  diuretics  stopped and appears comfortable, will continue to hold  with active gout   4 . will continue to followup, if LVEF remains <35% despite medical compliance will consider for CRT ICD in 3 months

## 2025-05-09 NOTE — DISCHARGE NOTE NURSING/CASE MANAGEMENT/SOCIAL WORK - PATIENT PORTAL LINK FT
You can access the FollowMyHealth Patient Portal offered by [facility] by registering at the following website: [email or web address]. By joining [facility]’s FollowMyHealth portal, you will also be able to view your health information using other applications (apps) compatible with our system.

## 2025-05-10 LAB
ANION GAP SERPL CALC-SCNC: 13 MMOL/L — SIGNIFICANT CHANGE UP (ref 5–17)
BUN SERPL-MCNC: 43 MG/DL — HIGH (ref 7–23)
CALCIUM SERPL-MCNC: 9.2 MG/DL — SIGNIFICANT CHANGE UP (ref 8.4–10.5)
CHLORIDE SERPL-SCNC: 108 MMOL/L — SIGNIFICANT CHANGE UP (ref 96–108)
CO2 SERPL-SCNC: 19 MMOL/L — LOW (ref 22–31)
CREAT SERPL-MCNC: 1.59 MG/DL — HIGH (ref 0.5–1.3)
EGFR: 46 ML/MIN/1.73M2 — LOW
EGFR: 46 ML/MIN/1.73M2 — LOW
GAMMA INTERFERON BACKGROUND BLD IA-ACNC: 0.02 IU/ML — SIGNIFICANT CHANGE UP
GLUCOSE SERPL-MCNC: 110 MG/DL — HIGH (ref 70–99)
M TB IFN-G BLD-IMP: ABNORMAL
M TB IFN-G CD4+ BCKGRND COR BLD-ACNC: 0 IU/ML — SIGNIFICANT CHANGE UP
M TB IFN-G CD4+CD8+ BCKGRND COR BLD-ACNC: 0 IU/ML — SIGNIFICANT CHANGE UP
MAGNESIUM SERPL-MCNC: 2.2 MG/DL — SIGNIFICANT CHANGE UP (ref 1.6–2.6)
POTASSIUM SERPL-MCNC: 4.8 MMOL/L — SIGNIFICANT CHANGE UP (ref 3.5–5.3)
POTASSIUM SERPL-SCNC: 4.8 MMOL/L — SIGNIFICANT CHANGE UP (ref 3.5–5.3)
QUANT TB PLUS MITOGEN MINUS NIL: 0.01 IU/ML — SIGNIFICANT CHANGE UP
SODIUM SERPL-SCNC: 140 MMOL/L — SIGNIFICANT CHANGE UP (ref 135–145)

## 2025-05-10 PROCEDURE — 99232 SBSQ HOSP IP/OBS MODERATE 35: CPT

## 2025-05-10 PROCEDURE — 93010 ELECTROCARDIOGRAM REPORT: CPT

## 2025-05-10 RX ORDER — METOPROLOL SUCCINATE 50 MG/1
25 TABLET, EXTENDED RELEASE ORAL ONCE
Refills: 0 | Status: COMPLETED | OUTPATIENT
Start: 2025-05-10 | End: 2025-05-10

## 2025-05-10 RX ORDER — METOPROLOL SUCCINATE 50 MG/1
50 TABLET, EXTENDED RELEASE ORAL DAILY
Refills: 0 | Status: DISCONTINUED | OUTPATIENT
Start: 2025-05-11 | End: 2025-05-15

## 2025-05-10 RX ADMIN — WHITE PETROLATUM 1 APPLICATION(S): 1 OINTMENT TOPICAL at 12:08

## 2025-05-10 RX ADMIN — PREDNISONE 30 MILLIGRAM(S): 20 TABLET ORAL at 05:40

## 2025-05-10 RX ADMIN — SACUBITRIL AND VALSARTAN 1 TABLET(S): 6; 6 PELLET ORAL at 05:40

## 2025-05-10 RX ADMIN — Medication 1000 MILLIGRAM(S): at 05:40

## 2025-05-10 RX ADMIN — Medication 81 MILLIGRAM(S): at 12:09

## 2025-05-10 RX ADMIN — METOPROLOL SUCCINATE 25 MILLIGRAM(S): 50 TABLET, EXTENDED RELEASE ORAL at 23:34

## 2025-05-10 RX ADMIN — COLCHICINE 0.6 MILLIGRAM(S): 0.6 TABLET, FILM COATED ORAL at 12:09

## 2025-05-10 RX ADMIN — Medication 1000 MILLIGRAM(S): at 06:10

## 2025-05-10 RX ADMIN — Medication 50 MILLIGRAM(S): at 05:40

## 2025-05-10 RX ADMIN — METOPROLOL SUCCINATE 25 MILLIGRAM(S): 50 TABLET, EXTENDED RELEASE ORAL at 05:40

## 2025-05-10 RX ADMIN — ATORVASTATIN CALCIUM 40 MILLIGRAM(S): 80 TABLET, FILM COATED ORAL at 21:43

## 2025-05-10 RX ADMIN — SACUBITRIL AND VALSARTAN 1 TABLET(S): 6; 6 PELLET ORAL at 17:30

## 2025-05-10 RX ADMIN — HEPARIN SODIUM 5000 UNIT(S): 1000 INJECTION INTRAVENOUS; SUBCUTANEOUS at 05:40

## 2025-05-10 RX ADMIN — HEPARIN SODIUM 5000 UNIT(S): 1000 INJECTION INTRAVENOUS; SUBCUTANEOUS at 21:43

## 2025-05-10 RX ADMIN — HEPARIN SODIUM 5000 UNIT(S): 1000 INJECTION INTRAVENOUS; SUBCUTANEOUS at 13:56

## 2025-05-10 RX ADMIN — Medication 50 MILLIGRAM(S): at 13:52

## 2025-05-10 RX ADMIN — Medication 50 MILLIGRAM(S): at 21:43

## 2025-05-10 NOTE — PROVIDER CONTACT NOTE (OTHER) - ASSESSMENT
Patient A&Ox3, pt had 21 bts wct on tele , pt was sleeping at the time of event and is asymptomatic, patient denies chest pain, SOB or other discomfort, V/S: /88 hr: 78 rr: 18 T: 97.5 O2: 98% room air.

## 2025-05-10 NOTE — PROVIDER CONTACT NOTE (OTHER) - BACKGROUND
71 yo M with PMHx of HTN, HLD, CKD3 CHF, cardiomyopathy LBBB, Glaucoma, cataracts presented to [facility] ED on 4/25 with c/o SOB associated with cough and congestion.
admit- SOB, hypertensive, tachypneic  Positive stress test

## 2025-05-10 NOTE — PROVIDER CONTACT NOTE (OTHER) - ACTION/TREATMENT ORDERED:
provider notified came at bedside to assess the pt, STAT EKG and labs ordered and done, STAT lopressor ordered and given, plan of care on going.

## 2025-05-10 NOTE — PROGRESS NOTE ADULT - SUBJECTIVE AND OBJECTIVE BOX
Patient is a 69y old  Male who presents with a chief complaint of joint pains (07 May 2025 19:14)      SUBJECTIVE / OVERNIGHT EVENTS: Comfortable without new complaints.   Review of Systems  chest pain no  palpitations no  sob no  nausea no  headache no    MEDICATIONS  (STANDING):  AQUAPHOR (petrolatum Ointment) 1 Application(s) Topical daily  aspirin enteric coated 81 milliGRAM(s) Oral daily  atorvastatin 40 milliGRAM(s) Oral at bedtime  colchicine 0.6 milliGRAM(s) Oral every other day  heparin   Injectable 5000 Unit(s) SubCutaneous every 8 hours  hydrALAZINE 50 milliGRAM(s) Oral every 8 hours  metoprolol succinate ER 25 milliGRAM(s) Oral daily  predniSONE   Tablet   Oral   predniSONE   Tablet 30 milliGRAM(s) Oral daily  sacubitril 24 mG/valsartan 26 mG 1 Tablet(s) Oral two times a day  sodium chloride 0.9%. 1000 milliLiter(s) (50 mL/Hr) IV Continuous <Continuous>    MEDICATIONS  (PRN):      Vital Signs Last 24 Hrs  T(C): 36.4 (10 May 2025 12:13), Max: 36.7 (09 May 2025 19:53)  T(F): 97.5 (10 May 2025 12:13), Max: 98 (09 May 2025 19:53)  HR: 90 (10 May 2025 12:13) (75 - 90)  BP: 114/71 (10 May 2025 12:13) (112/68 - 149/89)  BP(mean): --  RR: 18 (10 May 2025 12:13) (18 - 18)  SpO2: 98% (10 May 2025 12:13) (97% - 98%)    Parameters below as of 10 May 2025 12:13  Patient On (Oxygen Delivery Method): room air        PHYSICAL EXAM:  GENERAL: NAD, well-developed  HEAD:  Atraumatic, Normocephalic  EYES: Legally blind  NECK: Supple, No JVD  CHEST/LUNG: Clear to auscultation bilaterally; No wheeze  HEART: Regular rate and rhythm; No murmurs, rubs, or gallops  ABDOMEN: Soft, Nontender, Nondistended; Bowel sounds present  EXTREMITIES:  R knee improving tenderness, less warm   PSYCH: AAOx2  NEUROLOGY: non-focal  SKIN: No rashes or lesions    LABS:    05-09    140  |  106  |  50[H]  ----------------------------<  99  4.5   |  19[L]  |  1.67[H]    Ca    9.4      09 May 2025 07:07            Urinalysis Basic - ( 09 May 2025 07:07 )    Color: x / Appearance: x / SG: x / pH: x  Gluc: 99 mg/dL / Ketone: x  / Bili: x / Urobili: x   Blood: x / Protein: x / Nitrite: x   Leuk Esterase: x / RBC: x / WBC x   Sq Epi: x / Non Sq Epi: x / Bacteria: x          RADIOLOGY & ADDITIONAL TESTS:    Imaging Personally Reviewed:    Consultant(s) Notes Reviewed:      Care Discussed with Consultants/Other Providers:

## 2025-05-10 NOTE — PROGRESS NOTE ADULT - ASSESSMENT
69 m with     Cardiomyopathy  - Entresto,   - hold Aldactone   - Cardiology follow  - if EF <35 in 3 mo will need AICD    HTN  - control    CKD  - follow Cr, lytes     R knee pain / Gout Acute  - Xray knee as above  - Uric acid 8.4  - Colchicine every other day  - Prednisone taper  - Rheumatology follow    Cellulitis r/o  - QuantiFeron Gold TB indeterminate.    - repeat pending   - ID to reevaluate    DVT prophylaxis    DCP rehab.     d/w patient , ACP      Ar Abdi MD phone [phone]

## 2025-05-11 RX ADMIN — PREDNISONE 30 MILLIGRAM(S): 20 TABLET ORAL at 05:36

## 2025-05-11 RX ADMIN — METOPROLOL SUCCINATE 50 MILLIGRAM(S): 50 TABLET, EXTENDED RELEASE ORAL at 05:36

## 2025-05-11 RX ADMIN — Medication 81 MILLIGRAM(S): at 12:36

## 2025-05-11 RX ADMIN — Medication 50 MILLIGRAM(S): at 13:20

## 2025-05-11 RX ADMIN — SACUBITRIL AND VALSARTAN 1 TABLET(S): 6; 6 PELLET ORAL at 05:36

## 2025-05-11 RX ADMIN — HEPARIN SODIUM 5000 UNIT(S): 1000 INJECTION INTRAVENOUS; SUBCUTANEOUS at 05:37

## 2025-05-11 RX ADMIN — HEPARIN SODIUM 5000 UNIT(S): 1000 INJECTION INTRAVENOUS; SUBCUTANEOUS at 21:27

## 2025-05-11 RX ADMIN — Medication 50 MILLIGRAM(S): at 21:27

## 2025-05-11 RX ADMIN — HEPARIN SODIUM 5000 UNIT(S): 1000 INJECTION INTRAVENOUS; SUBCUTANEOUS at 13:20

## 2025-05-11 RX ADMIN — SACUBITRIL AND VALSARTAN 1 TABLET(S): 6; 6 PELLET ORAL at 17:45

## 2025-05-11 RX ADMIN — ATORVASTATIN CALCIUM 40 MILLIGRAM(S): 80 TABLET, FILM COATED ORAL at 21:27

## 2025-05-11 RX ADMIN — WHITE PETROLATUM 1 APPLICATION(S): 1 OINTMENT TOPICAL at 12:36

## 2025-05-11 RX ADMIN — Medication 50 MILLIGRAM(S): at 05:37

## 2025-05-11 NOTE — PROGRESS NOTE ADULT - SUBJECTIVE AND OBJECTIVE BOX
INTERVAL HPI/OVERNIGHT EVENTS: murtaza remains well ,sleeping ,    MEDICATIONS  (STANDING):  AQUAPHOR (petrolatum Ointment) 1 Application(s) Topical daily  aspirin enteric coated 81 milliGRAM(s) Oral daily  atorvastatin 40 milliGRAM(s) Oral at bedtime  colchicine 0.6 milliGRAM(s) Oral every other day  heparin   Injectable 5000 Unit(s) SubCutaneous every 8 hours  hydrALAZINE 50 milliGRAM(s) Oral every 8 hours  metoprolol succinate ER 50 milliGRAM(s) Oral daily  predniSONE   Tablet   Oral   sacubitril 24 mG/valsartan 26 mG 1 Tablet(s) Oral two times a day  sodium chloride 0.9%. 1000 milliLiter(s) (50 mL/Hr) IV Continuous <Continuous>    MEDICATIONS  (PRN):      Allergies    No Known Allergies    Intolerances      ROS:  General: Pt denies recent weight loss/fever/chills    Neurological: denies numbness or  sensation loss    Cardiovascular: denies chest pain/palpitations/leg edema    Respiratory and Thorax: denies SOB/cough/wheezing    Gastrointestinal: denies abdominal pain/diarrhea/constipation/bloody stool    Genitourinary: denies urinary frequency/urgency/ dysuria    Musculoskeletal: denies joint pain or swelling, denies restricted motion    Hematologic: denies abnormal bleeding  	    	  	    		        	    	            Vital Signs Last 24 Hrs  T(C): 36.3 (11 May 2025 21:21), Max: 36.6 (11 May 2025 04:46)  T(F): 97.3 (11 May 2025 21:21), Max: 97.9 (11 May 2025 11:30)  HR: 69 (11 May 2025 21:21) (69 - 78)  BP: 133/75 (11 May 2025 21:21) (118/71 - 144/82)  BP(mean): --  RR: 18 (11 May 2025 21:21) (18 - 18)  SpO2: 99% (11 May 2025 21:21) (97% - 99%)    Parameters below as of 11 May 2025 21:21  Patient On (Oxygen Delivery Method): room air      Daily     Daily Weight in k.2 (11 May 2025 04:46)    05-10 @ 07:01  -  05-11 @ 07:00  --------------------------------------------------------  IN: 960 mL / OUT: 1600 mL / NET: -640 mL     @ 07:01  -   @ 22:04  --------------------------------------------------------  IN: 100 mL / OUT: 450 mL / NET: -350 mL      Physical Exam:    wdwn female  no JVD  lung clear   cor  RRR  abd soft   ext no edema      LABS:    05-10    140  |  108  |  43[H]  ----------------------------<  110[H]  4.8   |  19[L]  |  1.59[H]    Ca    9.2      10 May 2025 23:31  Mg     2.2     05-10        Urinalysis Basic - ( 10 May 2025 23:31 )    Color: x / Appearance: x / SG: x / pH: x  Gluc: 110 mg/dL / Ketone: x  / Bili: x / Urobili: x   Blood: x / Protein: x / Nitrite: x   Leuk Esterase: x / RBC: x / WBC x   Sq Epi: x / Non Sq Epi: x / Bacteria: x        RADIOLOGY & ADDITIONAL TESTS:    TELE:    EKG:

## 2025-05-11 NOTE — CHART NOTE - NSCHARTNOTEFT_GEN_A_CORE
Discussed with ID regarding indeterminant QuantiFeron Gold results. per discussion , it appears that pt had received methylpred prior to test, hence results are indeterminate. Pt with no active TB symptoms. Will order MTB IGRA instead. Per ID fellow no need for pt to wait for results. but results need to be followed up.

## 2025-05-11 NOTE — PROGRESS NOTE ADULT - SUBJECTIVE AND OBJECTIVE BOX
Patient is a 69y old  Male who presents with a chief complaint of joint pains (07 May 2025 19:14)      SUBJECTIVE / OVERNIGHT EVENTS: Comfortable without new complaints.   Review of Systems  chest pain no  palpitations no  sob no  nausea no  headache no    MEDICATIONS  (STANDING):  AQUAPHOR (petrolatum Ointment) 1 Application(s) Topical daily  aspirin enteric coated 81 milliGRAM(s) Oral daily  atorvastatin 40 milliGRAM(s) Oral at bedtime  colchicine 0.6 milliGRAM(s) Oral every other day  heparin   Injectable 5000 Unit(s) SubCutaneous every 8 hours  hydrALAZINE 50 milliGRAM(s) Oral every 8 hours  metoprolol succinate ER 50 milliGRAM(s) Oral daily  predniSONE   Tablet   Oral   sacubitril 24 mG/valsartan 26 mG 1 Tablet(s) Oral two times a day  sodium chloride 0.9%. 1000 milliLiter(s) (50 mL/Hr) IV Continuous <Continuous>    MEDICATIONS  (PRN):      Vital Signs Last 24 Hrs  T(C): 36.6 (11 May 2025 11:30), Max: 36.6 (11 May 2025 04:46)  T(F): 97.9 (11 May 2025 11:30), Max: 97.9 (11 May 2025 11:30)  HR: 78 (11 May 2025 11:30) (70 - 78)  BP: 118/71 (11 May 2025 11:30) (118/71 - 144/82)  BP(mean): --  RR: 18 (11 May 2025 11:30) (18 - 18)  SpO2: 97% (11 May 2025 11:30) (97% - 98%)    Parameters below as of 11 May 2025 11:30  Patient On (Oxygen Delivery Method): room air        PHYSICAL EXAM:  GENERAL: NAD, well-developed  HEAD:  Atraumatic, Normocephalic  EYES: EOMI, PERRLA, legally blind  NECK: Supple, No JVD  CHEST/LUNG: Clear to auscultation bilaterally; No wheeze  HEART: Regular rate and rhythm; No murmurs, rubs, or gallops  ABDOMEN: Soft, Nontender, Nondistended; Bowel sounds present  EXTREMITIES:  2+ Peripheral Pulses, No clubbing, cyanosis, or edema R knee less tender.  PSYCH: AAOx3  NEUROLOGY: non-focal  SKIN: No rashes or lesions    LABS:    05-10    140  |  108  |  43[H]  ----------------------------<  110[H]  4.8   |  19[L]  |  1.59[H]    Ca    9.2      10 May 2025 23:31  Mg     2.2     05-10            Urinalysis Basic - ( 10 May 2025 23:31 )    Color: x / Appearance: x / SG: x / pH: x  Gluc: 110 mg/dL / Ketone: x  / Bili: x / Urobili: x   Blood: x / Protein: x / Nitrite: x   Leuk Esterase: x / RBC: x / WBC x   Sq Epi: x / Non Sq Epi: x / Bacteria: x          RADIOLOGY & ADDITIONAL TESTS:    Imaging Personally Reviewed:    Consultant(s) Notes Reviewed:      Care Discussed with Consultants/Other Providers:

## 2025-05-11 NOTE — PROGRESS NOTE ADULT - ASSESSMENT
69 m with     Cardiomyopathy  - Entresto,   - hold Aldactone   - Cardiology follow  - if EF <35 in 3 mo will need AICD    HTN  - control    CKD  - follow Cr, lytes     R knee pain / Gout Acute  - Xray knee as above  - Uric acid 8.4  - Colchicine every other day  - Prednisone taper  - Rheumatology follow    Cellulitis r/o  - QuantiFeron Gold TB indeterminate.    - repeat still indeterminate   - ID to reevaluate    DVT prophylaxis    DCP rehab.     d/w patient , ACP      Ar Abdi MD phone [phone]

## 2025-05-12 LAB
ANION GAP SERPL CALC-SCNC: 14 MMOL/L — SIGNIFICANT CHANGE UP (ref 5–17)
BUN SERPL-MCNC: 43 MG/DL — HIGH (ref 7–23)
CALCIUM SERPL-MCNC: 9.5 MG/DL — SIGNIFICANT CHANGE UP (ref 8.4–10.5)
CHLORIDE SERPL-SCNC: 108 MMOL/L — SIGNIFICANT CHANGE UP (ref 96–108)
CO2 SERPL-SCNC: 18 MMOL/L — LOW (ref 22–31)
CREAT SERPL-MCNC: 1.57 MG/DL — HIGH (ref 0.5–1.3)
EGFR: 47 ML/MIN/1.73M2 — LOW
EGFR: 47 ML/MIN/1.73M2 — LOW
GLUCOSE SERPL-MCNC: 79 MG/DL — SIGNIFICANT CHANGE UP (ref 70–99)
HCT VFR BLD CALC: 42 % — SIGNIFICANT CHANGE UP (ref 39–50)
HGB BLD-MCNC: 13.1 G/DL — SIGNIFICANT CHANGE UP (ref 13–17)
MCHC RBC-ENTMCNC: 22.5 PG — LOW (ref 27–34)
MCHC RBC-ENTMCNC: 31.2 G/DL — LOW (ref 32–36)
MCV RBC AUTO: 72.3 FL — LOW (ref 80–100)
NRBC BLD AUTO-RTO: 0 /100 WBCS — SIGNIFICANT CHANGE UP (ref 0–0)
PLATELET # BLD AUTO: 752 K/UL — HIGH (ref 150–400)
POTASSIUM SERPL-MCNC: 4.8 MMOL/L — SIGNIFICANT CHANGE UP (ref 3.5–5.3)
POTASSIUM SERPL-SCNC: 4.8 MMOL/L — SIGNIFICANT CHANGE UP (ref 3.5–5.3)
RBC # BLD: 5.81 M/UL — HIGH (ref 4.2–5.8)
RBC # FLD: 19.1 % — HIGH (ref 10.3–14.5)
SODIUM SERPL-SCNC: 140 MMOL/L — SIGNIFICANT CHANGE UP (ref 135–145)
WBC # BLD: 10.52 K/UL — HIGH (ref 3.8–10.5)
WBC # FLD AUTO: 10.52 K/UL — HIGH (ref 3.8–10.5)

## 2025-05-12 PROCEDURE — 99232 SBSQ HOSP IP/OBS MODERATE 35: CPT

## 2025-05-12 RX ADMIN — METOPROLOL SUCCINATE 50 MILLIGRAM(S): 50 TABLET, EXTENDED RELEASE ORAL at 05:21

## 2025-05-12 RX ADMIN — Medication 50 MILLIGRAM(S): at 05:21

## 2025-05-12 RX ADMIN — HEPARIN SODIUM 5000 UNIT(S): 1000 INJECTION INTRAVENOUS; SUBCUTANEOUS at 21:17

## 2025-05-12 RX ADMIN — SACUBITRIL AND VALSARTAN 1 TABLET(S): 6; 6 PELLET ORAL at 17:45

## 2025-05-12 RX ADMIN — Medication 81 MILLIGRAM(S): at 12:00

## 2025-05-12 RX ADMIN — COLCHICINE 0.6 MILLIGRAM(S): 0.6 TABLET, FILM COATED ORAL at 12:00

## 2025-05-12 RX ADMIN — Medication 50 MILLIGRAM(S): at 14:03

## 2025-05-12 RX ADMIN — Medication 50 MILLIGRAM(S): at 21:16

## 2025-05-12 RX ADMIN — HEPARIN SODIUM 5000 UNIT(S): 1000 INJECTION INTRAVENOUS; SUBCUTANEOUS at 05:21

## 2025-05-12 RX ADMIN — WHITE PETROLATUM 1 APPLICATION(S): 1 OINTMENT TOPICAL at 12:00

## 2025-05-12 RX ADMIN — ATORVASTATIN CALCIUM 40 MILLIGRAM(S): 80 TABLET, FILM COATED ORAL at 21:17

## 2025-05-12 RX ADMIN — SACUBITRIL AND VALSARTAN 1 TABLET(S): 6; 6 PELLET ORAL at 05:21

## 2025-05-12 RX ADMIN — PREDNISONE 20 MILLIGRAM(S): 20 TABLET ORAL at 05:21

## 2025-05-12 RX ADMIN — HEPARIN SODIUM 5000 UNIT(S): 1000 INJECTION INTRAVENOUS; SUBCUTANEOUS at 14:03

## 2025-05-12 NOTE — PROGRESS NOTE ADULT - SUBJECTIVE AND OBJECTIVE BOX
INTERVAL HPI/OVERNIGHT EVENTS: murtaza feels well, no chest pain no SOB    MEDICATIONS  (STANDING):  AQUAPHOR (petrolatum Ointment) 1 Application(s) Topical daily  aspirin enteric coated 81 milliGRAM(s) Oral daily  atorvastatin 40 milliGRAM(s) Oral at bedtime  colchicine 0.6 milliGRAM(s) Oral every other day  heparin   Injectable 5000 Unit(s) SubCutaneous every 8 hours  hydrALAZINE 50 milliGRAM(s) Oral every 8 hours  metoprolol succinate ER 50 milliGRAM(s) Oral daily  predniSONE   Tablet   Oral   predniSONE   Tablet 20 milliGRAM(s) Oral daily  sacubitril 24 mG/valsartan 26 mG 1 Tablet(s) Oral two times a day  sodium chloride 0.9%. 1000 milliLiter(s) (50 mL/Hr) IV Continuous <Continuous>    MEDICATIONS  (PRN):      Allergies    No Known Allergies    Intolerances      ROS:  General: Pt denies recent weight loss/fever/chills    Neurological: denies numbness or  sensation loss    Cardiovascular: denies chest pain/palpitations/leg edema    Respiratory and Thorax: denies SOB/cough/wheezing    Gastrointestinal: denies abdominal pain/diarrhea/constipation/bloody stool    Genitourinary: denies urinary frequency/urgency/ dysuria    Musculoskeletal: denies joint pain or swelling, denies restricted motion    Hematologic: denies abnormal bleeding  	    	  	    		        	    	            Vital Signs Last 24 Hrs  T(C): 36.6 (12 May 2025 12:02), Max: 36.6 (12 May 2025 12:02)  T(F): 97.8 (12 May 2025 12:02), Max: 97.8 (12 May 2025 12:02)  HR: 84 (12 May 2025 13:20) (69 - 84)  BP: 118/65 (12 May 2025 13:20) (118/65 - 133/80)  BP(mean): --  RR: 18 (12 May 2025 13:20) (18 - 18)  SpO2: 98% (12 May 2025 13:20) (98% - 99%)    Parameters below as of 12 May 2025 13:20  Patient On (Oxygen Delivery Method): room air      Daily     Daily Weight in k.9 (12 May 2025 04:17)    05-11 @ 07:01  -  05-12 @ 07:00  --------------------------------------------------------  IN: 440 mL / OUT: 2220 mL / NET: -1780 mL     @ 07:01  -   @ 20:35  --------------------------------------------------------  IN: 600 mL / OUT: 650 mL / NET: -50 mL      Physical Exam:    wdwn male  no JVD  cor RRR  lung clear   abd soft   ext no edema      LABS:                        13.1   10. )-----------( 752      ( 12 May 2025 06:59 )             42.0     05-12    140  |  108  |  43[H]  ----------------------------<  79  4.8   |  18[L]  |  1.57[H]    Ca    9.5      12 May 2025 06:59  Mg     2.2     05-10        Urinalysis Basic - ( 12 May 2025 06:59 )    Color: x / Appearance: x / SG: x / pH: x  Gluc: 79 mg/dL / Ketone: x  / Bili: x / Urobili: x   Blood: x / Protein: x / Nitrite: x   Leuk Esterase: x / RBC: x / WBC x   Sq Epi: x / Non Sq Epi: x / Bacteria: x        RADIOLOGY & ADDITIONAL TESTS:    TELE:    EKG:

## 2025-05-12 NOTE — DIETITIAN INITIAL EVALUATION ADULT - NSICDXPASTMEDICALHX_GEN_ALL_CORE_FT
PAST MEDICAL HISTORY:  Cataract     CKD (chronic kidney disease) stage 3, GFR 30-59 ml/min     Glaucoma     Gout     HLD (hyperlipidemia)     Hypertension

## 2025-05-12 NOTE — DISCHARGE NOTE PROVIDER - NSDCCPCAREPLAN_GEN_ALL_CORE_FT
PRINCIPAL DISCHARGE DIAGNOSIS  Diagnosis: Atherosclerotic cardiovascular disease  Assessment and Plan of Treatment: Coronary artery disease is a condition where the arteries the supply the heart muscle get clogges with fatty deposits & puts you at risk for a heart attack  Call your doctor if you have any new pain, pressure, or discomfort in the center of your chest, pain, tingling or discomfort in arms, back, neck, jaw, or stomach, shortness of breath, nausea, vomiting, burping or heartburn, sweating, cold and clammy skin, racing or abnormal heartbeat for more than 10 minutes or if they keep coming & going.  Call 911 and do not tr to get to hospital by care  You can help yourself with lefestyle changes (quitting smoking if you Informed pt of the various negative side effects of smoking including risk of COPD, Lung Ca etc  Strongly recommended that pt stops smoking and pt given various options of smoking cessasion tools such as NRT's and other pharmacotherapies), eat lots of fruits & vegetables & low fat dairy products, not a lot of meat & fatty foods, walk or some form of physical activity most days of the week, lose weight if you are overweight  Take your cardiac medication as prescribed to lower cholesterol, to lower blood pressure, aspirin to prevent blood clots, and diabetes control  Make sure to keep appointments with doctor for cardiac follow up care        SECONDARY DISCHARGE DIAGNOSES  Diagnosis: Gout  Assessment and Plan of Treatment: Take all antibiotics as ordered.  Call you Health care provider upon arrival home to make a one week follow up appointment.  If you develop fever, chills, malaise, or change in mental status call your Health Care Provider or go to the Emergency Department.  Nutrition is important, eat small frequent meals to help ensure you get adequate calories.  Do not stay in bed all day!  Increase your activity daily as tolerated.      Diagnosis: Acute on chronic systolic congestive heart failure  Assessment and Plan of Treatment: Weigh yourself daily.  If you gain 3lbs in 3 days, or 5lbs in a week call your Health Care Provider.  Do not eat or drink foods containing more than 2000mg of salt (sodium) in your diet every day.  Call your Health Care Provider if you have any swelling or increased swelling in your feet, ankles, and/or stomach.  The Pt was provided with CHF diet instruction (low sodium diet, daily weights, label reading, Heart Healthy Cooking Tips & Heart Healthy shopping Tips).  Take all of your medication as directed.  If you become dizzy call your Health Care Provider.

## 2025-05-12 NOTE — PROGRESS NOTE ADULT - ASSESSMENT
69 m with     Cardiomyopathy  - Entresto,   - hold Aldactone   - Cardiology follow  - if EF <35 in 3 mo will need AICD     HTN  - control    CKD  - follow Cr, lytes     R knee pain / Gout Acute  - Xray knee as above  - Uric acid 8.4  - Colchicine every other day  - Prednisone taper  - Rheumatology follow    Cellulitis r/o  - QuantiFeron Gold TB indeterminate.    - repeat still indeterminate   - MTB IGRA pending     DVT prophylaxis    DCP rehab.     d/w patient , ACP      Ar Abdi MD phone [phone]

## 2025-05-12 NOTE — DIETITIAN INITIAL EVALUATION ADULT - WEIGHT FOR BMI (LBS)
UROLOGY CLINIC VISIT PATIENT INSTRUCTIONS    Continue tamsulosin 0.4 mg daily and finasteride 5 mg daily. Both were renewed at the pharmacy.    If you would like to consider additional treatment options for the erectile dysfunction, call or send a [employer] message. If you want to consider surgery, recommend that you see Dr. Conner Camacho or Dr. José Miguel Olivo.    Otherwise, follow up with Janet Dodd PA-C in 1 year for medication refills. Will have Florecita Bender reach out to you to schedule follow up.    If you have any issues, questions or concerns in the meantime, do not hesitate to contact us at [phone] or via [employer].     It was a pleasure meeting with you today.  Thank you for allowing me and my team the privilege of caring for you today.  YOU are the reason we are here, and I truly hope we provided you with the excellent service you deserve.  Please let us know if there is anything else we can do for you so that we can be sure you are leaving completely satisfied with your care experience.         193.7

## 2025-05-12 NOTE — DIETITIAN INITIAL EVALUATION ADULT - OTHER INFO
Weight history:    -UBW:  230pounds  -Weight changes PTA:  none  -Weight as per previous RD notes/Jean HANKINS:  weight has been stable as per pt  -Dosing weight:  104.3kg  -Weight as per flow sheets: weights are questionable

## 2025-05-12 NOTE — PROGRESS NOTE ADULT - SUBJECTIVE AND OBJECTIVE BOX
Follow Up:  gouty arthritis    Interval History/ROS:  no pain,  wants to be mobilized    Allergies  No Known Allergies    ANTIMICROBIALS:      OTHER MEDS:  MEDICATIONS  (STANDING):  aspirin enteric coated 81 daily  atorvastatin 40 at bedtime  colchicine 0.6 every other day  heparin   Injectable 5000 every 8 hours  hydrALAZINE 50 every 8 hours  metoprolol succinate ER 50 daily  predniSONE   Tablet    predniSONE   Tablet 20 daily  sacubitril 24 mG/valsartan 26 mG 1 two times a day      Vital Signs Last 24 Hrs  T(C): 36.6 (12 May 2025 12:02), Max: 36.6 (12 May 2025 12:02)  T(F): 97.8 (12 May 2025 12:02), Max: 97.8 (12 May 2025 12:02)  HR: 84 (12 May 2025 13:20) (69 - 84)  BP: 118/65 (12 May 2025 13:20) (118/65 - 133/80)  BP(mean): --  RR: 18 (12 May 2025 13:20) (18 - 18)  SpO2: 98% (12 May 2025 13:20) (98% - 99%)    Parameters below as of 12 May 2025 13:20  Patient On (Oxygen Delivery Method): room air        PHYSICAL EXAM:  General: WN/WD NAD, Non-toxic  Neurology: A&Ox3, nonfocal  Respiratory: Clear to auscultation bilaterally  CV: RRR, S1S2, no murmurs, rubs or gallops  Abdominal: Soft, Non-tender, non-distended, normal bowel sounds  Extremities: trace edema,  no cellulitis,  some hyperpigmentation  Line Sites: Clear  Skin: No rash                          13.1   10.52 )-----------( 752      ( 12 May 2025 06:59 )             42.0       05-12    140  |  108  |  43[H]  ----------------------------<  79  4.8   |  18[L]  |  1.57[H]    Ca    9.5      12 May 2025 06:59  Mg     2.2     05-10    Quantiferon Plus TB (05.08.25 @ 12:13) Quantiferon TB Plus: INDETERMINATE    RADIOLOGY:  < from: Xray Chest 1 View- PORTABLE-Urgent (Xray Chest 1 View- PORTABLE-Urgent .) (05.05.25 @ 23:52) >  FINDINGS:  The heart is normal in size.  The lungs are clear.  There is no pneumothorax or pleural effusion.    IMPRESSION:  Clear lungs.    < end of copied text >  < from: CT Renal Stone Hunt (08.29.17 @ 14:59) >  LOWER CHEST: Bibasilar atelectasis.    ABDOMEN:  RIGHT KIDNEY AND URETER: Nonobstructive renal calculi measuring up to 1   mm. No hydronephrosis or ureteral calculi.  LEFT KIDNEY AND URETER: Nonobstructive renal calculi measuring up to 1   mm. No hydronephrosis or ureteral calculi.  LIVER: Within normal limits.  BILE DUCTS: Normal caliber.  GALLBLADDER: No calcified gallstones. Normal caliber wall.  PANCREAS: Within normal limits.  SPLEEN: Within normal limits.  ADRENALS: Within normal limits.    PELVIS:  REPRODUCTIVE ORGANS: The prostate gland and seminal vesicles are within   normal limits.  URINARY BLADDER: Within normal limits.    BOWEL: Several colonic diverticula. No bowel obstruction.  APPENDIX: Within normal limits.  PERITONEUM: No ascites or free air. No fluid collection.  VESSELS: Atherosclerotic changes.  RETROPERITONEUM: Within normal limits.  ABDOMINAL WALL: Small fat-containing right inguinal hernia.  BONES: Spinal degenerative changes.    IMPRESSION:    Bilateral nonobstructive nephrolithiasis.  No ureteral or bladder calculi.  No hydronephrosis.    < end of copied text >      Josué Block MD; Division of Infectious Disease; Pager: [phone]; nights and weekends: [phone]

## 2025-05-12 NOTE — DIETITIAN INITIAL EVALUATION ADULT - PERTINENT MEDS FT
MEDICATIONS  (STANDING):  AQUAPHOR (petrolatum Ointment) 1 Application(s) Topical daily  aspirin enteric coated 81 milliGRAM(s) Oral daily  atorvastatin 40 milliGRAM(s) Oral at bedtime  colchicine 0.6 milliGRAM(s) Oral every other day  heparin   Injectable 5000 Unit(s) SubCutaneous every 8 hours  hydrALAZINE 50 milliGRAM(s) Oral every 8 hours  metoprolol succinate ER 50 milliGRAM(s) Oral daily  predniSONE   Tablet   Oral   predniSONE   Tablet 20 milliGRAM(s) Oral daily  sacubitril 24 mG/valsartan 26 mG 1 Tablet(s) Oral two times a day  sodium chloride 0.9%. 1000 milliLiter(s) (50 mL/Hr) IV Continuous <Continuous>    MEDICATIONS  (PRN):

## 2025-05-12 NOTE — DISCHARGE NOTE PROVIDER - HOSPITAL COURSE
HPI:   69 yo M with PMHx of HTN, HLD, CKD3 CHF, cardiomyopathy LBBB, Glaucoma, cataracts presented to [facility] ED on 4/25 with c/o SOB associated with cough & congestion. pt hypertensive, tachypneic, tachycardic was placed on BIPAP, s/p Stress test 4/27/25 which reveals a small size, moderate in severity, fixed defect involving the mid inferior & apical inferior segment. There is a small size partially reversible defect involving the apical septal segment this is consistent with possible scar with per- infarct ischemia involving the LAD EF ~24% Seen & evaluated by cardiologist & now transferred to [facility] for LHC.    Card: ANASTASIA Phan  Pt's spouse reports that he did not take any medications at home   TTE 4/30/25 severely decrease LV function EF 20-25%, LVH, global LV hypokinesis without regional heterogeneity. Grade 3 diastolic dysfunction.     (02 May 2025 07:48)    Hospital Course: 70yoM PMHx HTN, HLD, CKD3 CHF, cardiomyopathy LBBB, Glaucoma, cataracts presented to [facility] ED on 4/25 with c/o SOB a/w cough & congestion. pt hypertensive, tachypneic, tachycardic was placed on BIPAP, s/p Stress test 4/27/25 which reveals a small size, moderate in severity, fixed defect involving the mid inferior & apical inferior segment. There is a small size partially reversible defect involving the apical septal segment this is consistent with possible scar with per- infarct ischemia involving the LAD EF ~24% Seen & evaluated by cardiologist & now transferred to [facility] for LHC.  Abnormal stress- s/p LHC 5/2 w/ nonobstructive atherosclerosis. Cardiomyopathy/HFrEF- c/w entresto, hydralazine, toprol increased 5/1. BL knee pain c/f gout- rheum following, XR w/ knee joint effusion, started on cholchicine 5/6; IV steroids>> prednisone taper; not cellulitis per ID. NSVT > BB uptitrated.      Important Medication Changes and Reason:    Active or Pending Issues Requiring Follow-up:  Follow-up with PCP, Cardiology and Rheum    Advanced Directives:   [X ] Full code  [ ] DNR  [ ] Hospice    Discharge Diagnoses:  Nonobstructive atherosclerosis  Cardiomyopathy  HF  knee effusion       HPI:   69 yo M with PMHx of HTN, HLD, CKD3 CHF, cardiomyopathy LBBB, Glaucoma, cataracts presented to [facility] ED on 4/25 with c/o SOB associated with cough & congestion. pt hypertensive, tachypneic, tachycardic was placed on BIPAP, s/p Stress test 4/27/25 which reveals a small size, moderate in severity, fixed defect involving the mid inferior & apical inferior segment. There is a small size partially reversible defect involving the apical septal segment this is consistent with possible scar with per- infarct ischemia involving the LAD EF ~24% Seen & evaluated by cardiologist & now transferred to [facility] for LHC.    Card: ANASTASIA Phan  Pt's spouse reports that he did not take any medications at home   TTE 4/30/25 severely decrease LV function EF 20-25%, LVH, global LV hypokinesis without regional heterogeneity. Grade 3 diastolic dysfunction.     (02 May 2025 07:48)    Hospital Course: 70yoM PMHx HTN, HLD, CKD3 CHF, cardiomyopathy LBBB, Glaucoma, cataracts presented to [facility] ED on 4/25 with c/o SOB a/w cough & congestion. pt hypertensive, tachypneic, tachycardic was placed on BIPAP, s/p Stress test 4/27/25 which reveals a small size, moderate in severity, fixed defect involving the mid inferior & apical inferior segment. There is a small size partially reversible defect involving the apical septal segment this is consistent with possible scar with per- infarct ischemia involving the LAD EF ~24% Seen & evaluated by cardiologist & now transferred to [facility] for LHC.  Abnormal stress- s/p LHC 5/2 w/ nonobstructive atherosclerosis. Cardiomyopathy/HFrEF- c/w entresto, hydralazine, toprol increased 5/1. BL knee pain c/f gout- rheum following, XR w/ knee joint effusion, started on cholchicine 5/6; IV steroids>> prednisone taper; not cellulitis per ID. NSVT > BB uptitrated.      Important Medication Changes and Reason:  Stop Allopurinol (+HLAB 58)   Hold Aldactone     Active or Pending Issues Requiring Follow-up:  Follow up with rheumatology  Follow up with cardiology  Follow up with PCP  Follow up liver panel in 1 week     Advanced Directives:   [X ] Full code  [ ] DNR  [ ] Hospice    Discharge Diagnoses:  Nonobstructive atherosclerosis  Cardiomyopathy  HF/ EF 35%  Knee effusion  Gout

## 2025-05-12 NOTE — DIETITIAN INITIAL EVALUATION ADULT - PERTINENT LABORATORY DATA
05-12    140  |  108  |  43[H]  ----------------------------<  79  4.8   |  18[L]  |  1.57[H]    Ca    9.5      12 May 2025 06:59  Mg     2.2     05-10

## 2025-05-12 NOTE — PROGRESS NOTE ADULT - ASSESSMENT
69 yr male with HTN DM HLD cardiomyopathy HFrEF chronic renal failure , creatinine 1.5-1.8, LBBB, medical noncompliance presents with acute pulmonary edema , borderline elevated troponin to Nicolasa, + stress test, diuresed , cath today with nonobstructive disease. Vision poor and gait unsteady, lives with family. bilateral knee pain c/w gout, entresto  and metoprolol, hydralazine restarted with improvement    advise    1.  continue entresto 24/26  BID  2. continue metoprolol XL 25 mg daily  3. continue. hydralazine 50 TID   4  ID and rheumatology f u   5  will continue to followup, if LVEF remains <35% despite medical compliance will consider for CRT ICD in 3 months

## 2025-05-12 NOTE — DIETITIAN INITIAL EVALUATION ADULT - OTHER CALCULATIONS
fluid deferred to MD due to HF  lower range of calorie needs due to elevated BMI in overweight range

## 2025-05-12 NOTE — DISCHARGE NOTE PROVIDER - NSFOLLOWUPCLINICS_GEN_ALL_ED_FT
[facility] Cardiology  Cardiology  [place]   Phone:   Fax:   Follow Up Time: 1-3 days    [facility] - [facility]  Cardiology  [facility], [address] [place] [address]   Phone:   Fax:   Follow Up Time: 1-3 days

## 2025-05-12 NOTE — PROGRESS NOTE ADULT - ASSESSMENT
69 yo M with PMHx of HTN, HLD, CKD3 CHF, cardiomyopathy LBBB, Glaucoma, cataracts presented to [facility] ED on 4/25/ 2025  with c/o SOB associated with cough & congestion.   TTE 4/30/25 severely decrease LV function EF 20-25%, LVH, global LV hypokinesis without regional heterogeneity. Grade 3 diastolic dysfunction.   5/2 cardiac cath demonstrated mild non obstructive CAD.    Acute joint pains related to gouty arthritis  Presentation does not indicate LE cellulitis  He has venous stasis changes, local ecchymoses  SOLUMEDROL 40 mg daily x 3 days  5/6-->  5/7  feels better, LE's improve appearance    5/8/25 repeated Quantiferon gold tb INDETERMINATE in context of acute steroid treatment  Mr Ag denies any TB exposure, There is no history of anyone in family iwth TB.  He was born and raised in [facility] 1955 and lived there until 1987 when he came to [place]  - has lived in [place] in [place] area ever since  There is no  granuloma or suggestion to TB on imaging     In one report, the prevalence of TB in 2015 in [facility] = 432  (as compared to 3317 cases in [place] and 210 cases in [place])    No indication for treatment of latent TB  would repeat Quant gold tb 2 months or more after stopping steroids  consider Physical Therapy/mobilization    signing off case  - please reconsult ID as needed

## 2025-05-12 NOTE — PROGRESS NOTE ADULT - SUBJECTIVE AND OBJECTIVE BOX
Patient is a 69y old  Male who presents with a chief complaint of joint pains (12 May 2025 13:25)      SUBJECTIVE / OVERNIGHT EVENTS: Comfortable without new complaints.   Review of Systems  chest pain no  palpitations no  sob no  nausea no  headache no    MEDICATIONS  (STANDING):  AQUAPHOR (petrolatum Ointment) 1 Application(s) Topical daily  aspirin enteric coated 81 milliGRAM(s) Oral daily  atorvastatin 40 milliGRAM(s) Oral at bedtime  colchicine 0.6 milliGRAM(s) Oral every other day  heparin   Injectable 5000 Unit(s) SubCutaneous every 8 hours  hydrALAZINE 50 milliGRAM(s) Oral every 8 hours  metoprolol succinate ER 50 milliGRAM(s) Oral daily  predniSONE   Tablet   Oral   predniSONE   Tablet 20 milliGRAM(s) Oral daily  sacubitril 24 mG/valsartan 26 mG 1 Tablet(s) Oral two times a day  sodium chloride 0.9%. 1000 milliLiter(s) (50 mL/Hr) IV Continuous <Continuous>    MEDICATIONS  (PRN):      Vital Signs Last 24 Hrs  T(C): 36.6 (12 May 2025 12:02), Max: 36.6 (12 May 2025 12:02)  T(F): 97.8 (12 May 2025 12:02), Max: 97.8 (12 May 2025 12:02)  HR: 84 (12 May 2025 13:20) (69 - 84)  BP: 118/65 (12 May 2025 13:20) (118/65 - 133/80)  BP(mean): --  RR: 18 (12 May 2025 13:20) (18 - 18)  SpO2: 98% (12 May 2025 13:20) (98% - 99%)    Parameters below as of 12 May 2025 13:20  Patient On (Oxygen Delivery Method): room air        PHYSICAL EXAM:  GENERAL: NAD, well-developed  HEAD:  Atraumatic, Normocephalic  EYES: legally blind   NECK: Supple, No JVD  CHEST/LUNG: Clear to auscultation bilaterally; No wheeze  HEART: Regular rate and rhythm; No murmurs, rubs, or gallops  ABDOMEN: Soft, Nontender, Nondistended; Bowel sounds present  EXTREMITIES:  No edema R knee less tender   PSYCH: AAOx3  NEUROLOGY: non-focal  SKIN: No rashes or lesions    LABS:                        13.1   10.52 )-----------( 752      ( 12 May 2025 06:59 )             42.0     05-12    140  |  108  |  43[H]  ----------------------------<  79  4.8   |  18[L]  |  1.57[H]    Ca    9.5      12 May 2025 06:59  Mg     2.2     05-10            Urinalysis Basic - ( 12 May 2025 06:59 )    Color: x / Appearance: x / SG: x / pH: x  Gluc: 79 mg/dL / Ketone: x  / Bili: x / Urobili: x   Blood: x / Protein: x / Nitrite: x   Leuk Esterase: x / RBC: x / WBC x   Sq Epi: x / Non Sq Epi: x / Bacteria: x          RADIOLOGY & ADDITIONAL TESTS:    Imaging Personally Reviewed:    Consultant(s) Notes Reviewed:      Care Discussed with Consultants/Other Providers:

## 2025-05-12 NOTE — DISCHARGE NOTE PROVIDER - NSDCFUADDAPPT_GEN_ALL_CORE_FT
APPTS ARE READY TO BE MADE: [X] YES    Best Family or Patient Contact (if needed):    Additional Information about above appointments (if needed):    1: Dr. Phan  2: Rheumatology   3:     Other comments or requests:    APPTS ARE READY TO BE MADE: [X] YES    Best Family or Patient Contact (if needed):    Additional Information about above appointments (if needed):    1: Dr. Phan  2: Rheumatology   3: HF    Other comments or requests:   check liver panel in 1 week    APPTS ARE READY TO BE MADE: [X] YES    Best Family or Patient Contact (if needed):    Additional Information about above appointments (if needed):    1: Dr. Phan  2: Rheumatology   3: HF    Other comments or requests:   check liver panel in 1 week       Patient is being transferred. Caregiver will arrange follow up.

## 2025-05-12 NOTE — DISCHARGE NOTE PROVIDER - CARE PROVIDER_API CALL
Faisal Phan  Cardiac Electrophysiology  [address], [address]  [place] [address]  Phone: [phone]  Fax: [phone]  Follow Up Time: 1 week    Eleanor Rock  Rheumatology  [address], [address]  [place] [address]  Phone: [phone]  Fax: [phone]  Follow Up Time: 2 weeks

## 2025-05-12 NOTE — DISCHARGE NOTE PROVIDER - PROVIDER TOKENS
PROVIDER:[TOKEN:[[id number]],FOLLOWUP:[1 week]],PROVIDER:[TOKEN:[[id number]],FOLLOWUP:[2 weeks]]

## 2025-05-12 NOTE — DISCHARGE NOTE PROVIDER - NSDCMRMEDTOKEN_GEN_ALL_CORE_FT
Aldactone 25 mg oral tablet: 1 tab(s) orally once a day  aspirin 81 mg oral delayed release tablet: 1 tab(s) orally once a day  atorvastatin 40 mg oral tablet: 1 tab(s) orally once a day (at bedtime)  Entresto 24 mg-26 mg oral tablet: 1 tab(s) orally 2 times a day  hydrALAZINE 50 mg oral tablet: 0.5 tab(s) orally every 8 hours   aspirin 81 mg oral delayed release tablet: 1 tab(s) orally once a day  atorvastatin 40 mg oral tablet: 1 tab(s) orally once a day (at bedtime)  colchicine 0.6 mg oral tablet: 1 tab(s) orally every other day  Entresto 24 mg-26 mg oral tablet: 1 tab(s) orally 2 times a day  hydrALAZINE 50 mg oral tablet: 0.5 tab(s) orally every 8 hours  metoprolol succinate 50 mg oral tablet, extended release: 1 tab(s) orally once a day  petrolatum topical ointment: 1 Apply topically to affected area once a day

## 2025-05-13 RX ADMIN — ATORVASTATIN CALCIUM 40 MILLIGRAM(S): 80 TABLET, FILM COATED ORAL at 21:58

## 2025-05-13 RX ADMIN — Medication 50 MILLIGRAM(S): at 21:58

## 2025-05-13 RX ADMIN — Medication 81 MILLIGRAM(S): at 15:20

## 2025-05-13 RX ADMIN — Medication 50 MILLIGRAM(S): at 05:41

## 2025-05-13 RX ADMIN — HEPARIN SODIUM 5000 UNIT(S): 1000 INJECTION INTRAVENOUS; SUBCUTANEOUS at 05:42

## 2025-05-13 RX ADMIN — HEPARIN SODIUM 5000 UNIT(S): 1000 INJECTION INTRAVENOUS; SUBCUTANEOUS at 21:58

## 2025-05-13 RX ADMIN — PREDNISONE 20 MILLIGRAM(S): 20 TABLET ORAL at 05:41

## 2025-05-13 RX ADMIN — Medication 50 MILLIGRAM(S): at 15:20

## 2025-05-13 RX ADMIN — METOPROLOL SUCCINATE 50 MILLIGRAM(S): 50 TABLET, EXTENDED RELEASE ORAL at 05:41

## 2025-05-13 RX ADMIN — HEPARIN SODIUM 5000 UNIT(S): 1000 INJECTION INTRAVENOUS; SUBCUTANEOUS at 15:20

## 2025-05-13 RX ADMIN — SACUBITRIL AND VALSARTAN 1 TABLET(S): 6; 6 PELLET ORAL at 17:35

## 2025-05-13 RX ADMIN — SACUBITRIL AND VALSARTAN 1 TABLET(S): 6; 6 PELLET ORAL at 05:41

## 2025-05-13 NOTE — PROGRESS NOTE ADULT - SUBJECTIVE AND OBJECTIVE BOX
Patient is a 69y old  Male who presents with a chief complaint of joint pains, gout (12 May 2025 17:12)      SUBJECTIVE / OVERNIGHT EVENTS: Comfortable without new complaints.   Review of Systems  chest pain no  palpitations no  sob no  nausea no  headache no    MEDICATIONS  (STANDING):  AQUAPHOR (petrolatum Ointment) 1 Application(s) Topical daily  aspirin enteric coated 81 milliGRAM(s) Oral daily  atorvastatin 40 milliGRAM(s) Oral at bedtime  colchicine 0.6 milliGRAM(s) Oral every other day  heparin   Injectable 5000 Unit(s) SubCutaneous every 8 hours  hydrALAZINE 50 milliGRAM(s) Oral every 8 hours  metoprolol succinate ER 50 milliGRAM(s) Oral daily  predniSONE   Tablet   Oral   predniSONE   Tablet 20 milliGRAM(s) Oral daily  sacubitril 24 mG/valsartan 26 mG 1 Tablet(s) Oral two times a day  sodium chloride 0.9%. 1000 milliLiter(s) (50 mL/Hr) IV Continuous <Continuous>    MEDICATIONS  (PRN):      Vital Signs Last 24 Hrs  T(C): 36.7 (13 May 2025 12:24), Max: 36.7 (13 May 2025 12:24)  T(F): 98 (13 May 2025 12:24), Max: 98 (13 May 2025 12:24)  HR: 83 (13 May 2025 12:24) (68 - 83)  BP: 117/75 (13 May 2025 12:24) (117/75 - 130/75)  BP(mean): --  RR: 18 (13 May 2025 12:24) (18 - 18)  SpO2: 99% (13 May 2025 12:24) (98% - 99%)    Parameters below as of 13 May 2025 12:24  Patient On (Oxygen Delivery Method): room air        PHYSICAL EXAM:  GENERAL: NAD, well-developed  HEAD:  Atraumatic, Normocephalic  EYES: EOMI Legally blind   NECK: Supple, No JVD  CHEST/LUNG: Clear to auscultation bilaterally; No wheeze  HEART: Regular rate and rhythm; No murmurs, rubs, or gallops  ABDOMEN: Soft, Nontender, Nondistended; Bowel sounds present  EXTREMITIES:  2+ Peripheral Pulses, No clubbing, cyanosis, or edema  PSYCH: AAOx3  NEUROLOGY: non-focal  SKIN: No rashes or lesions    LABS:                        13.1   10.52 )-----------( 752      ( 12 May 2025 06:59 )             42.0     05-12    140  |  108  |  43[H]  ----------------------------<  79  4.8   |  18[L]  |  1.57[H]    Ca    9.5      12 May 2025 06:59            Urinalysis Basic - ( 12 May 2025 06:59 )    Color: x / Appearance: x / SG: x / pH: x  Gluc: 79 mg/dL / Ketone: x  / Bili: x / Urobili: x   Blood: x / Protein: x / Nitrite: x   Leuk Esterase: x / RBC: x / WBC x   Sq Epi: x / Non Sq Epi: x / Bacteria: x          RADIOLOGY & ADDITIONAL TESTS:    Imaging Personally Reviewed:    Consultant(s) Notes Reviewed:      Care Discussed with Consultants/Other Providers:

## 2025-05-13 NOTE — PROGRESS NOTE ADULT - ASSESSMENT
69 m with     Cardiomyopathy  - Entresto,   - hold Aldactone   - Cardiology follow  - if EF <35 in 3 mo will need AICD     HTN  - control    CKD  - follow Cr, lytes     R knee pain / Gout Acute  - Xray knee as above  - Uric acid 8.4  - Colchicine every other day  - Prednisone taper  - Rheumatology follow    Cellulitis r/o  - QuantiFeron Gold TB indeterminate.    - repeat still indeterminate   - MTB IGRA pending     DVT prophylaxis    DCP rehab in progress.     d/w patient     Ar Abdi MD phone [phone]

## 2025-05-14 LAB
HLA-B58 MUTATION ANALYSIS: SIGNIFICANT CHANGE UP
IGNF NEG CNTRL BLD: SIGNIFICANT CHANGE UP
M TB IFN-G BLD-IMP: NEGATIVE — SIGNIFICANT CHANGE UP
MITOGEN IGNF BCKGRD COR BLD-ACNC: 0 — SIGNIFICANT CHANGE UP
MITOGEN IGNF BCKGRD COR BLD-ACNC: 0 — SIGNIFICANT CHANGE UP
MITOGEN IGNF.SPOT COUNT BLD: SIGNIFICANT CHANGE UP

## 2025-05-14 PROCEDURE — G0452: CPT | Mod: 26

## 2025-05-14 RX ADMIN — Medication 50 MILLIGRAM(S): at 21:33

## 2025-05-14 RX ADMIN — COLCHICINE 0.6 MILLIGRAM(S): 0.6 TABLET, FILM COATED ORAL at 15:02

## 2025-05-14 RX ADMIN — ATORVASTATIN CALCIUM 40 MILLIGRAM(S): 80 TABLET, FILM COATED ORAL at 21:33

## 2025-05-14 RX ADMIN — SACUBITRIL AND VALSARTAN 1 TABLET(S): 6; 6 PELLET ORAL at 17:21

## 2025-05-14 RX ADMIN — Medication 81 MILLIGRAM(S): at 15:02

## 2025-05-14 RX ADMIN — WHITE PETROLATUM 1 APPLICATION(S): 1 OINTMENT TOPICAL at 15:03

## 2025-05-14 RX ADMIN — PREDNISONE 20 MILLIGRAM(S): 20 TABLET ORAL at 06:05

## 2025-05-14 RX ADMIN — HEPARIN SODIUM 5000 UNIT(S): 1000 INJECTION INTRAVENOUS; SUBCUTANEOUS at 15:02

## 2025-05-14 RX ADMIN — HEPARIN SODIUM 5000 UNIT(S): 1000 INJECTION INTRAVENOUS; SUBCUTANEOUS at 21:33

## 2025-05-14 RX ADMIN — Medication 50 MILLIGRAM(S): at 15:04

## 2025-05-14 RX ADMIN — SACUBITRIL AND VALSARTAN 1 TABLET(S): 6; 6 PELLET ORAL at 06:05

## 2025-05-14 RX ADMIN — HEPARIN SODIUM 5000 UNIT(S): 1000 INJECTION INTRAVENOUS; SUBCUTANEOUS at 06:05

## 2025-05-14 RX ADMIN — METOPROLOL SUCCINATE 50 MILLIGRAM(S): 50 TABLET, EXTENDED RELEASE ORAL at 06:05

## 2025-05-14 RX ADMIN — Medication 50 MILLIGRAM(S): at 06:05

## 2025-05-14 NOTE — PROGRESS NOTE ADULT - SUBJECTIVE AND OBJECTIVE BOX
Patient is a 69y old  Male who presents with a chief complaint of joint pains, gout (12 May 2025 17:12)      SUBJECTIVE / OVERNIGHT EVENTS: Comfortable without new complaints.   Review of Systems  chest pain no  palpitations no  sob no  nausea no  headache no    MEDICATIONS  (STANDING):  AQUAPHOR (petrolatum Ointment) 1 Application(s) Topical daily  aspirin enteric coated 81 milliGRAM(s) Oral daily  atorvastatin 40 milliGRAM(s) Oral at bedtime  colchicine 0.6 milliGRAM(s) Oral every other day  heparin   Injectable 5000 Unit(s) SubCutaneous every 8 hours  hydrALAZINE 50 milliGRAM(s) Oral every 8 hours  metoprolol succinate ER 50 milliGRAM(s) Oral daily  predniSONE   Tablet   Oral   sacubitril 24 mG/valsartan 26 mG 1 Tablet(s) Oral two times a day  sodium chloride 0.9%. 1000 milliLiter(s) (50 mL/Hr) IV Continuous <Continuous>    MEDICATIONS  (PRN):      Vital Signs Last 24 Hrs  T(C): 36.2 (14 May 2025 11:48), Max: 36.4 (14 May 2025 04:56)  T(F): 97.2 (14 May 2025 11:48), Max: 97.5 (14 May 2025 04:56)  HR: 68 (14 May 2025 11:48) (68 - 74)  BP: 132/76 (14 May 2025 11:48) (116/70 - 132/76)  BP(mean): --  RR: 18 (14 May 2025 11:48) (18 - 18)  SpO2: 96% (14 May 2025 11:48) (96% - 98%)    Parameters below as of 14 May 2025 11:48  Patient On (Oxygen Delivery Method): room air        PHYSICAL EXAM:  GENERAL: NAD   HEAD:  Atraumatic, Normocephalic  EYES: EOMI, PERRLA,Legally blind  NECK: Supple, No JVD  CHEST/LUNG: Clear to auscultation bilaterally; No wheeze  HEART: Regular rate and rhythm; No murmurs, rubs, or gallops  ABDOMEN: Soft, Nontender, Nondistended; Bowel sounds present  EXTREMITIES:  2+ Peripheral Pulses, No clubbing, cyanosis, or edema  PSYCH: AAOx3  NEUROLOGY: non-focal  SKIN: No rashes or lesions    LABS:                      RADIOLOGY & ADDITIONAL TESTS:    Imaging Personally Reviewed:    Consultant(s) Notes Reviewed:      Care Discussed with Consultants/Other Providers:

## 2025-05-14 NOTE — PROGRESS NOTE ADULT - ASSESSMENT
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 Unable to use Allopurinol.   - Rheumatology follow re recommendations     Cellulitis r/o  - QuantiFeron Gold TB indeterminate.    - repeat still indeterminate   - MTB IGRA pending     DVT prophylaxis    DCP rehab in progress.     d/w patient     Ar Abdi MD phone [phone]

## 2025-05-15 VITALS
SYSTOLIC BLOOD PRESSURE: 135 MMHG | TEMPERATURE: 98 F | OXYGEN SATURATION: 98 % | DIASTOLIC BLOOD PRESSURE: 65 MMHG | HEART RATE: 77 BPM | RESPIRATION RATE: 18 BRPM

## 2025-05-15 LAB
ALBUMIN SERPL ELPH-MCNC: 2.9 G/DL — LOW (ref 3.3–5)
ALP SERPL-CCNC: 93 U/L — SIGNIFICANT CHANGE UP (ref 40–120)
ALT FLD-CCNC: 143 U/L — HIGH (ref 10–45)
ANION GAP SERPL CALC-SCNC: 14 MMOL/L — SIGNIFICANT CHANGE UP (ref 5–17)
ANISOCYTOSIS BLD QL: SLIGHT — SIGNIFICANT CHANGE UP
AST SERPL-CCNC: 41 U/L — HIGH (ref 10–40)
BASOPHILS # BLD AUTO: 0 K/UL — SIGNIFICANT CHANGE UP (ref 0–0.2)
BASOPHILS NFR BLD AUTO: 0 % — SIGNIFICANT CHANGE UP (ref 0–2)
BILIRUB SERPL-MCNC: 0.2 MG/DL — SIGNIFICANT CHANGE UP (ref 0.2–1.2)
BUN SERPL-MCNC: 31 MG/DL — HIGH (ref 7–23)
CALCIUM SERPL-MCNC: 9.1 MG/DL — SIGNIFICANT CHANGE UP (ref 8.4–10.5)
CHLORIDE SERPL-SCNC: 107 MMOL/L — SIGNIFICANT CHANGE UP (ref 96–108)
CO2 SERPL-SCNC: 19 MMOL/L — LOW (ref 22–31)
CREAT SERPL-MCNC: 1.51 MG/DL — HIGH (ref 0.5–1.3)
DACRYOCYTES BLD QL SMEAR: SLIGHT — SIGNIFICANT CHANGE UP
EGFR: 49 ML/MIN/1.73M2 — LOW
EGFR: 49 ML/MIN/1.73M2 — LOW
EOSINOPHIL # BLD AUTO: 0.22 K/UL — SIGNIFICANT CHANGE UP (ref 0–0.5)
EOSINOPHIL NFR BLD AUTO: 2.6 % — SIGNIFICANT CHANGE UP (ref 0–6)
GLUCOSE SERPL-MCNC: 89 MG/DL — SIGNIFICANT CHANGE UP (ref 70–99)
HCT VFR BLD CALC: 36.9 % — LOW (ref 39–50)
HGB BLD-MCNC: 11.6 G/DL — LOW (ref 13–17)
HYPOCHROMIA BLD QL: SLIGHT — SIGNIFICANT CHANGE UP
LYMPHOCYTES # BLD AUTO: 2.9 K/UL — SIGNIFICANT CHANGE UP (ref 1–3.3)
LYMPHOCYTES # BLD AUTO: 33.9 % — SIGNIFICANT CHANGE UP (ref 13–44)
MACROCYTES BLD QL: SLIGHT — SIGNIFICANT CHANGE UP
MAGNESIUM SERPL-MCNC: 2 MG/DL — SIGNIFICANT CHANGE UP (ref 1.6–2.6)
MANUAL SMEAR VERIFICATION: SIGNIFICANT CHANGE UP
MCHC RBC-ENTMCNC: 22.7 PG — LOW (ref 27–34)
MCHC RBC-ENTMCNC: 31.4 G/DL — LOW (ref 32–36)
MCV RBC AUTO: 72.2 FL — LOW (ref 80–100)
MICROCYTES BLD QL: SLIGHT — SIGNIFICANT CHANGE UP
MONOCYTES # BLD AUTO: 0.6 K/UL — SIGNIFICANT CHANGE UP (ref 0–0.9)
MONOCYTES NFR BLD AUTO: 7 % — SIGNIFICANT CHANGE UP (ref 2–14)
NEUTROPHILS # BLD AUTO: 4.83 K/UL — SIGNIFICANT CHANGE UP (ref 1.8–7.4)
NEUTROPHILS NFR BLD AUTO: 56.5 % — SIGNIFICANT CHANGE UP (ref 43–77)
OVALOCYTES BLD QL SMEAR: SLIGHT — SIGNIFICANT CHANGE UP
PLAT MORPH BLD: NORMAL — SIGNIFICANT CHANGE UP
PLATELET # BLD AUTO: 706 K/UL — HIGH (ref 150–400)
POIKILOCYTOSIS BLD QL AUTO: SLIGHT — SIGNIFICANT CHANGE UP
POLYCHROMASIA BLD QL SMEAR: SLIGHT — SIGNIFICANT CHANGE UP
POTASSIUM SERPL-MCNC: 3.9 MMOL/L — SIGNIFICANT CHANGE UP (ref 3.5–5.3)
POTASSIUM SERPL-SCNC: 3.9 MMOL/L — SIGNIFICANT CHANGE UP (ref 3.5–5.3)
PROT SERPL-MCNC: 5.9 G/DL — LOW (ref 6–8.3)
RBC # BLD: 5.11 M/UL — SIGNIFICANT CHANGE UP (ref 4.2–5.8)
RBC # FLD: 19.5 % — HIGH (ref 10.3–14.5)
RBC BLD AUTO: ABNORMAL
SODIUM SERPL-SCNC: 140 MMOL/L — SIGNIFICANT CHANGE UP (ref 135–145)
WBC # BLD: 8.54 K/UL — SIGNIFICANT CHANGE UP (ref 3.8–10.5)
WBC # FLD AUTO: 8.54 K/UL — SIGNIFICANT CHANGE UP (ref 3.8–10.5)

## 2025-05-15 PROCEDURE — 83880 ASSAY OF NATRIURETIC PEPTIDE: CPT

## 2025-05-15 PROCEDURE — 84100 ASSAY OF PHOSPHORUS: CPT

## 2025-05-15 PROCEDURE — 73560 X-RAY EXAM OF KNEE 1 OR 2: CPT

## 2025-05-15 PROCEDURE — 85025 COMPLETE CBC W/AUTO DIFF WBC: CPT

## 2025-05-15 PROCEDURE — 97116 GAIT TRAINING THERAPY: CPT

## 2025-05-15 PROCEDURE — 86140 C-REACTIVE PROTEIN: CPT

## 2025-05-15 PROCEDURE — 85027 COMPLETE CBC AUTOMATED: CPT

## 2025-05-15 PROCEDURE — C1769: CPT

## 2025-05-15 PROCEDURE — 80053 COMPREHEN METABOLIC PANEL: CPT

## 2025-05-15 PROCEDURE — 83735 ASSAY OF MAGNESIUM: CPT

## 2025-05-15 PROCEDURE — 36415 COLL VENOUS BLD VENIPUNCTURE: CPT

## 2025-05-15 PROCEDURE — C1894: CPT

## 2025-05-15 PROCEDURE — 71045 X-RAY EXAM CHEST 1 VIEW: CPT

## 2025-05-15 PROCEDURE — 73610 X-RAY EXAM OF ANKLE: CPT

## 2025-05-15 PROCEDURE — 81373 HLA I TYPING 1 LOCUS LR: CPT

## 2025-05-15 PROCEDURE — 85652 RBC SED RATE AUTOMATED: CPT

## 2025-05-15 PROCEDURE — 81381 HLA I TYPING 1 ALLELE HR: CPT

## 2025-05-15 PROCEDURE — 93454 CORONARY ARTERY ANGIO S&I: CPT

## 2025-05-15 PROCEDURE — 97110 THERAPEUTIC EXERCISES: CPT

## 2025-05-15 PROCEDURE — 97530 THERAPEUTIC ACTIVITIES: CPT

## 2025-05-15 PROCEDURE — 84550 ASSAY OF BLOOD/URIC ACID: CPT

## 2025-05-15 PROCEDURE — 97161 PT EVAL LOW COMPLEX 20 MIN: CPT

## 2025-05-15 PROCEDURE — 73620 X-RAY EXAM OF FOOT: CPT

## 2025-05-15 PROCEDURE — C1887: CPT

## 2025-05-15 PROCEDURE — 80048 BASIC METABOLIC PNL TOTAL CA: CPT

## 2025-05-15 PROCEDURE — 93005 ELECTROCARDIOGRAM TRACING: CPT

## 2025-05-15 PROCEDURE — 86481 TB AG RESPONSE T-CELL SUSP: CPT

## 2025-05-15 PROCEDURE — 86480 TB TEST CELL IMMUN MEASURE: CPT

## 2025-05-15 RX ORDER — PREDNISONE 20 MG/1
1 TABLET ORAL
Qty: 3 | Refills: 0
Start: 2025-05-15 | End: 2025-05-17

## 2025-05-15 RX ORDER — METOPROLOL SUCCINATE 50 MG/1
1 TABLET, EXTENDED RELEASE ORAL
Qty: 0 | Refills: 0 | DISCHARGE
Start: 2025-05-15

## 2025-05-15 RX ORDER — COLCHICINE 0.6 MG/1
1 TABLET, FILM COATED ORAL
Qty: 0 | Refills: 0 | DISCHARGE
Start: 2025-05-15

## 2025-05-15 RX ORDER — SPIRONOLACTONE 25 MG
1 TABLET ORAL
Refills: 0 | DISCHARGE

## 2025-05-15 RX ORDER — WHITE PETROLATUM 1 G/G
1 OINTMENT TOPICAL
Qty: 0 | Refills: 0 | DISCHARGE
Start: 2025-05-15

## 2025-05-15 RX ORDER — PREDNISONE 20 MG/1
0.5 TABLET ORAL
Qty: 1.5 | Refills: 0
Start: 2025-05-15 | End: 2025-05-17

## 2025-05-15 RX ADMIN — Medication 81 MILLIGRAM(S): at 11:54

## 2025-05-15 RX ADMIN — HEPARIN SODIUM 5000 UNIT(S): 1000 INJECTION INTRAVENOUS; SUBCUTANEOUS at 14:52

## 2025-05-15 RX ADMIN — Medication 50 MILLIGRAM(S): at 06:02

## 2025-05-15 RX ADMIN — WHITE PETROLATUM 1 APPLICATION(S): 1 OINTMENT TOPICAL at 11:54

## 2025-05-15 RX ADMIN — Medication 50 MILLIGRAM(S): at 14:52

## 2025-05-15 RX ADMIN — METOPROLOL SUCCINATE 50 MILLIGRAM(S): 50 TABLET, EXTENDED RELEASE ORAL at 06:02

## 2025-05-15 RX ADMIN — HEPARIN SODIUM 5000 UNIT(S): 1000 INJECTION INTRAVENOUS; SUBCUTANEOUS at 06:02

## 2025-05-15 RX ADMIN — PREDNISONE 10 MILLIGRAM(S): 20 TABLET ORAL at 06:02

## 2025-05-15 RX ADMIN — SACUBITRIL AND VALSARTAN 1 TABLET(S): 6; 6 PELLET ORAL at 06:02

## 2025-05-15 NOTE — CHART NOTE - NSCHARTNOTEFT_GEN_A_CORE
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    Can follow the steroid taper that we recommended in our last note dated 5/7/2025  can continue colchicine 0.6mg every other day.

## 2025-05-15 NOTE — PROGRESS NOTE ADULT - PROVIDER SPECIALTY LIST ADULT
Electrophysiology
Electrophysiology
Infectious Disease
Internal Medicine
Internal Medicine
Electrophysiology
Internal Medicine
Rheumatology
Electrophysiology
Infectious Disease
Internal Medicine
Rheumatology

## 2025-05-15 NOTE — PROGRESS NOTE ADULT - ASSESSMENT
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 Unable to use Allopurinol.   - Rheumatology follow re recommendations     Cellulitis r/o  - QuantiFeron Gold TB indeterminate.    - repeat still indeterminate   - MTB IGRA pending     Transaminitis  - follow as OTP    DVT prophylaxis    DCP rehab.     d/w patient , consultant, ACP    Ar Abdi MD phone [phone]

## 2025-05-15 NOTE — PROGRESS NOTE ADULT - SUBJECTIVE AND OBJECTIVE BOX
Patient is a 69y old  Male who presents with a chief complaint of joint pains, gout (12 May 2025 17:12)      SUBJECTIVE / OVERNIGHT EVENTS: Comfortable without new complaints. Feels better.   Review of Systems  chest pain no  palpitations no  sob no  nausea no  headache no    MEDICATIONS  (STANDING):  AQUAPHOR (petrolatum Ointment) 1 Application(s) Topical daily  aspirin enteric coated 81 milliGRAM(s) Oral daily  atorvastatin 40 milliGRAM(s) Oral at bedtime  colchicine 0.6 milliGRAM(s) Oral every other day  heparin   Injectable 5000 Unit(s) SubCutaneous every 8 hours  hydrALAZINE 50 milliGRAM(s) Oral every 8 hours  metoprolol succinate ER 50 milliGRAM(s) Oral daily  predniSONE   Tablet   Oral   predniSONE   Tablet 10 milliGRAM(s) Oral daily  sacubitril 24 mG/valsartan 26 mG 1 Tablet(s) Oral two times a day  sodium chloride 0.9%. 1000 milliLiter(s) (50 mL/Hr) IV Continuous <Continuous>    MEDICATIONS  (PRN):      Vital Signs Last 24 Hrs  T(C): 36.6 (15 May 2025 04:32), Max: 36.6 (15 May 2025 04:32)  T(F): 97.8 (15 May 2025 04:32), Max: 97.8 (15 May 2025 04:32)  HR: 75 (15 May 2025 04:32) (68 - 75)  BP: 149/80 (15 May 2025 04:32) (126/73 - 149/80)  BP(mean): --  RR: 18 (15 May 2025 04:32) (18 - 18)  SpO2: 98% (15 May 2025 04:32) (98% - 98%)    Parameters below as of 15 May 2025 04:32  Patient On (Oxygen Delivery Method): room air        PHYSICAL EXAM:  GENERAL: NAD   HEAD:  Atraumatic, Normocephalic  EYES: EOMI, PERRLA, Legally blind  NECK: Supple, No JVD  CHEST/LUNG: Clear to auscultation bilaterally; No wheeze  HEART: Regular rate and rhythm; No murmurs, rubs, or gallops  ABDOMEN: Soft, Nontender, Nondistended; Bowel sounds present  EXTREMITIES:  2+ Peripheral Pulses, No clubbing, cyanosis, or edema  PSYCH: AAOx3  NEUROLOGY: non-focal  SKIN: No rashes or lesions    LABS:                        11.6   8.54  )-----------( 706      ( 15 May 2025 07:15 )             36.9     05-15    140  |  107  |  31[H]  ----------------------------<  89  3.9   |  19[L]  |  1.51[H]    Ca    9.1      15 May 2025 07:15  Mg     2.0     05-15    TPro  5.9[L]  /  Alb  2.9[L]  /  TBili  0.2  /  DBili  x   /  AST  41[H]  /  ALT  143[H]  /  AlkPhos  93  05-15          Urinalysis Basic - ( 15 May 2025 07:15 )    Color: x / Appearance: x / SG: x / pH: x  Gluc: 89 mg/dL / Ketone: x  / Bili: x / Urobili: x   Blood: x / Protein: x / Nitrite: x   Leuk Esterase: x / RBC: x / WBC x   Sq Epi: x / Non Sq Epi: x / Bacteria: x          RADIOLOGY & ADDITIONAL TESTS:    Imaging Personally Reviewed:    Consultant(s) Notes Reviewed:      Care Discussed with Consultants/Other Providers:

## 2025-05-20 LAB
HLA-B ALLELE 1: SIGNIFICANT CHANGE UP
HLA-B ALLELE 2: SIGNIFICANT CHANGE UP
REF LAB TEST METHOD: SIGNIFICANT CHANGE UP